# Patient Record
Sex: MALE | Race: WHITE | NOT HISPANIC OR LATINO | URBAN - METROPOLITAN AREA
[De-identification: names, ages, dates, MRNs, and addresses within clinical notes are randomized per-mention and may not be internally consistent; named-entity substitution may affect disease eponyms.]

---

## 2017-04-02 ENCOUNTER — INPATIENT (INPATIENT)
Facility: HOSPITAL | Age: 82
LOS: 0 days | Discharge: HOME | DRG: 916 | End: 2017-04-03
Attending: INTERNAL MEDICINE | Admitting: INTERNAL MEDICINE
Payer: COMMERCIAL

## 2017-04-02 VITALS
RESPIRATION RATE: 18 BRPM | OXYGEN SATURATION: 98 % | DIASTOLIC BLOOD PRESSURE: 77 MMHG | TEMPERATURE: 97 F | HEART RATE: 79 BPM | SYSTOLIC BLOOD PRESSURE: 173 MMHG | HEIGHT: 71 IN | WEIGHT: 147.05 LBS

## 2017-04-02 DIAGNOSIS — T78.40XA ALLERGY, UNSPECIFIED, INITIAL ENCOUNTER: ICD-10-CM

## 2017-04-02 DIAGNOSIS — F41.9 ANXIETY DISORDER, UNSPECIFIED: ICD-10-CM

## 2017-04-02 DIAGNOSIS — R21 RASH AND OTHER NONSPECIFIC SKIN ERUPTION: ICD-10-CM

## 2017-04-02 DIAGNOSIS — Z29.9 ENCOUNTER FOR PROPHYLACTIC MEASURES, UNSPECIFIED: ICD-10-CM

## 2017-04-02 DIAGNOSIS — I10 ESSENTIAL (PRIMARY) HYPERTENSION: ICD-10-CM

## 2017-04-02 DIAGNOSIS — E78.5 HYPERLIPIDEMIA, UNSPECIFIED: ICD-10-CM

## 2017-04-02 DIAGNOSIS — T78.3XXA ANGIONEUROTIC EDEMA, INITIAL ENCOUNTER: ICD-10-CM

## 2017-04-02 LAB
ALBUMIN SERPL ELPH-MCNC: 3.4 G/DL — SIGNIFICANT CHANGE UP (ref 3.3–5)
ALP SERPL-CCNC: 50 U/L — SIGNIFICANT CHANGE UP (ref 40–120)
ALT FLD-CCNC: 29 U/L — SIGNIFICANT CHANGE UP (ref 12–78)
ANION GAP SERPL CALC-SCNC: 7 MMOL/L — SIGNIFICANT CHANGE UP (ref 5–17)
APTT BLD: 30.1 SEC — SIGNIFICANT CHANGE UP (ref 27.5–37.4)
AST SERPL-CCNC: 40 U/L — HIGH (ref 15–37)
BASOPHILS # BLD AUTO: 0.1 K/UL — SIGNIFICANT CHANGE UP (ref 0–0.2)
BASOPHILS NFR BLD AUTO: 1.1 % — SIGNIFICANT CHANGE UP (ref 0–2)
BILIRUB SERPL-MCNC: 0.7 MG/DL — SIGNIFICANT CHANGE UP (ref 0.2–1.2)
BUN SERPL-MCNC: 25 MG/DL — HIGH (ref 7–23)
CALCIUM SERPL-MCNC: 8.5 MG/DL — SIGNIFICANT CHANGE UP (ref 8.5–10.1)
CHLORIDE SERPL-SCNC: 105 MMOL/L — SIGNIFICANT CHANGE UP (ref 96–108)
CO2 SERPL-SCNC: 27 MMOL/L — SIGNIFICANT CHANGE UP (ref 22–31)
CREAT SERPL-MCNC: 1.1 MG/DL — SIGNIFICANT CHANGE UP (ref 0.5–1.3)
EOSINOPHIL # BLD AUTO: 0.4 K/UL — SIGNIFICANT CHANGE UP (ref 0–0.5)
EOSINOPHIL NFR BLD AUTO: 4 % — SIGNIFICANT CHANGE UP (ref 0–6)
GLUCOSE SERPL-MCNC: 154 MG/DL — HIGH (ref 70–99)
HCT VFR BLD CALC: 39.7 % — SIGNIFICANT CHANGE UP (ref 39–50)
HGB BLD-MCNC: 13.7 G/DL — SIGNIFICANT CHANGE UP (ref 13–17)
INR BLD: 1.13 RATIO — SIGNIFICANT CHANGE UP (ref 0.88–1.16)
LYMPHOCYTES # BLD AUTO: 0.9 K/UL — LOW (ref 1–3.3)
LYMPHOCYTES # BLD AUTO: 9.9 % — LOW (ref 13–44)
MCHC RBC-ENTMCNC: 34.6 GM/DL — SIGNIFICANT CHANGE UP (ref 32–36)
MCHC RBC-ENTMCNC: 36.2 PG — HIGH (ref 27–34)
MCV RBC AUTO: 104.5 FL — HIGH (ref 80–100)
MONOCYTES # BLD AUTO: 1 K/UL — HIGH (ref 0–0.9)
MONOCYTES NFR BLD AUTO: 10 % — HIGH (ref 1–9)
NEUTROPHILS # BLD AUTO: 7.2 K/UL — SIGNIFICANT CHANGE UP (ref 1.8–7.4)
NEUTROPHILS NFR BLD AUTO: 75 % — SIGNIFICANT CHANGE UP (ref 43–77)
PLATELET # BLD AUTO: 303 K/UL — SIGNIFICANT CHANGE UP (ref 150–400)
POTASSIUM SERPL-MCNC: 4.7 MMOL/L — SIGNIFICANT CHANGE UP (ref 3.5–5.3)
POTASSIUM SERPL-SCNC: 4.7 MMOL/L — SIGNIFICANT CHANGE UP (ref 3.5–5.3)
PROT SERPL-MCNC: 6.7 G/DL — SIGNIFICANT CHANGE UP (ref 6–8.3)
PROTHROM AB SERPL-ACNC: 12.4 SEC — SIGNIFICANT CHANGE UP (ref 9.8–12.7)
RBC # BLD: 3.8 M/UL — LOW (ref 4.2–5.8)
RBC # FLD: 12.1 % — SIGNIFICANT CHANGE UP (ref 10.3–14.5)
SODIUM SERPL-SCNC: 139 MMOL/L — SIGNIFICANT CHANGE UP (ref 135–145)
WBC # BLD: 9.6 K/UL — SIGNIFICANT CHANGE UP (ref 3.8–10.5)
WBC # FLD AUTO: 9.6 K/UL — SIGNIFICANT CHANGE UP (ref 3.8–10.5)

## 2017-04-02 PROCEDURE — 99285 EMERGENCY DEPT VISIT HI MDM: CPT | Mod: 25

## 2017-04-02 PROCEDURE — 93010 ELECTROCARDIOGRAM REPORT: CPT

## 2017-04-02 RX ORDER — FAMOTIDINE 10 MG/ML
20 INJECTION INTRAVENOUS
Qty: 0 | Refills: 0 | Status: DISCONTINUED | OUTPATIENT
Start: 2017-04-02 | End: 2017-04-03

## 2017-04-02 RX ORDER — FLUVASTATIN SODIUM 80 MG/1
0 TABLET, FILM COATED, EXTENDED RELEASE ORAL
Qty: 0 | Refills: 0 | COMMUNITY

## 2017-04-02 RX ORDER — ALPRAZOLAM 0.25 MG
0.25 TABLET ORAL DAILY
Qty: 0 | Refills: 0 | Status: DISCONTINUED | OUTPATIENT
Start: 2017-04-02 | End: 2017-04-03

## 2017-04-02 RX ORDER — ENOXAPARIN SODIUM 100 MG/ML
40 INJECTION SUBCUTANEOUS EVERY 24 HOURS
Qty: 0 | Refills: 0 | Status: DISCONTINUED | OUTPATIENT
Start: 2017-04-02 | End: 2017-04-03

## 2017-04-02 RX ORDER — ALPRAZOLAM 0.25 MG
0 TABLET ORAL
Qty: 0 | Refills: 0 | COMMUNITY

## 2017-04-02 RX ORDER — SIMVASTATIN 20 MG/1
20 TABLET, FILM COATED ORAL AT BEDTIME
Qty: 0 | Refills: 0 | Status: DISCONTINUED | OUTPATIENT
Start: 2017-04-02 | End: 2017-04-03

## 2017-04-02 RX ORDER — DIPHENHYDRAMINE HCL 50 MG
50 CAPSULE ORAL ONCE
Qty: 0 | Refills: 0 | Status: COMPLETED | OUTPATIENT
Start: 2017-04-02 | End: 2017-04-02

## 2017-04-02 RX ORDER — FAMOTIDINE 10 MG/ML
20 INJECTION INTRAVENOUS ONCE
Qty: 0 | Refills: 0 | Status: COMPLETED | OUTPATIENT
Start: 2017-04-02 | End: 2017-04-02

## 2017-04-02 RX ORDER — AMLODIPINE BESYLATE 2.5 MG/1
5 TABLET ORAL DAILY
Qty: 0 | Refills: 0 | Status: DISCONTINUED | OUTPATIENT
Start: 2017-04-02 | End: 2017-04-03

## 2017-04-02 RX ORDER — DIPHENHYDRAMINE HCL 50 MG
25 CAPSULE ORAL ONCE
Qty: 0 | Refills: 0 | Status: COMPLETED | OUTPATIENT
Start: 2017-04-02 | End: 2017-04-02

## 2017-04-02 RX ORDER — AMLODIPINE BESYLATE 2.5 MG/1
5 TABLET ORAL ONCE
Qty: 0 | Refills: 0 | Status: DISCONTINUED | OUTPATIENT
Start: 2017-04-02 | End: 2017-04-02

## 2017-04-02 RX ADMIN — Medication 20 MILLIGRAM(S): at 21:55

## 2017-04-02 RX ADMIN — FAMOTIDINE 20 MILLIGRAM(S): 10 INJECTION INTRAVENOUS at 22:06

## 2017-04-02 RX ADMIN — Medication 125 MILLIGRAM(S): at 07:49

## 2017-04-02 RX ADMIN — AMLODIPINE BESYLATE 5 MILLIGRAM(S): 2.5 TABLET ORAL at 16:14

## 2017-04-02 RX ADMIN — Medication 25 MILLIGRAM(S): at 21:55

## 2017-04-02 RX ADMIN — Medication 50 MILLIGRAM(S): at 07:49

## 2017-04-02 RX ADMIN — FAMOTIDINE 20 MILLIGRAM(S): 10 INJECTION INTRAVENOUS at 07:49

## 2017-04-02 NOTE — H&P ADULT - PROBLEM SELECTOR PLAN 3
mood stable. pt. states intermittently anxious, well controlled with xanax prn, will continue as needed pt. states he is on Lescol xl 80 mg at home, theraputic interchange: zocor 20 mg daily elevated in ED, likely secondary to not getting AM dose and anxiety. ACE-I held secondary to possible reaction.   - pt. to be started on norvasc 5mg PO daily with hold parameters, will trend bp per routine and adjust medication as warranted

## 2017-04-02 NOTE — ED PROVIDER NOTE - CARE PLAN
Principal Discharge DX:	Allergic reaction, initial encounter Principal Discharge DX:	Allergic reaction, initial encounter  Secondary Diagnosis:	Angioedema, initial encounter

## 2017-04-02 NOTE — ED ADULT NURSE NOTE - OBJECTIVE STATEMENT
ptient comes to ED for evaluation of lip swelling and rash states lips began swelling yesterday with rash no difficulty breathing pt on no new meds

## 2017-04-02 NOTE — H&P ADULT - PROBLEM SELECTOR PLAN 4
lovenox 40 mg subcut daily  dash/tlc diet   aspiration precautions  ambulate with assistance  out of bed with assistance  fall precautions mood stable. pt. states intermittently anxious, well controlled with xanax prn, will continue as needed pt. states he is on Lescol xl 80 mg at home, theraputic interchange: zocor 20 mg daily

## 2017-04-02 NOTE — ED PROVIDER NOTE - ENMT, MLM
Airway patent, Mild swelling to lips, Mouth with normal mucosa. Throat has no vesicles, no oropharyngeal exudates and uvula is midline, but swollen.

## 2017-04-02 NOTE — H&P ADULT - PROBLEM SELECTOR PLAN 2
elevated in ED, likely secondary to not getting AM dose and anxiety. ACE-I held secondary to possible reaction.   - pt. to be started on norvasc 5mg PO daily with hold parameters, will trend bp per routine and adjust medication as warranted Pt. has maculopapular rash on trunk and back. Currently not complaining about itch. Will monitor for progression/ regression.   - Monitor spot on posterior midline neck crease

## 2017-04-02 NOTE — ED PROVIDER NOTE - CHPI ED SYMPTOMS NEG
no vomiting/no shortness of breath/no nausea/no cough/no throat itching/no difficulty breathing/no wheezing/no difficulty swallowing

## 2017-04-02 NOTE — ED PROVIDER NOTE - PROGRESS NOTE DETAILS
pt improved somehwat, but uvula still swollen. pt's son arrived at bedside, he relates pt's voice sounds different to him. d/w perlman, he will admit pt

## 2017-04-02 NOTE — ED PROVIDER NOTE - OBJECTIVE STATEMENT
pt c/o few days of itchy rash to torso, woke up this am with swelling to lips. no fevers, chills, ha, d/n/v, cp, sob, abd pain.  pmd - grady pt c/o few days of itchy rash to torso, woke up this am with swelling to lips. no fevers, chills, ha, d/n/v, cp, sob, abd pain. per wife, no voice changes  pmd - grady

## 2017-04-02 NOTE — H&P ADULT - ATTENDING COMMENTS
pt seen and agree w housestaff and case discussed  advance care planning discussed with pt and family as well  family to bring health care proxy
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
## 2017-04-02 NOTE — H&P ADULT - PROBLEM SELECTOR PLAN 1
Pt. has hx of ACE-I usage for the past 30+ years with no prior/ similar reaction. Pt. states no introduction of new agents recently.   - Pt. admitted to remote tele for labs and vitals to be monitored per routine, will monitor closely for signs/ symptoms of progressive angioedema  - ACE-I held  - Solumedrol IV 20 mg q8

## 2017-04-02 NOTE — H&P ADULT - HISTORY OF PRESENT ILLNESS
86 yo m pmhx htn, hld, and anxiety presents to the ED with rash on his torso and lip swelling. Pt. has been on ACE-I for years.     In the ED, pt. vss (afebrile) except BP elevated (likely secondary to not receiving AM dose of BP meds and anxiety). Labs wnl except dip in RBC (3.80) and elevated MCV (104.5), will continue to trend. Pt. received benadryl, pepcid, and solumedrol. EKG pending. Pt. admitted to remote [facility] with allergic reaction/ angioedema. 84 yo m pmhx htn, hld, and anxiety presents to the ED with rash on his torso and lip swelling. Pt. has been on ACE-I for 30+ years. Pt. complained to wife the past few days that he was itchy on his sides, wife looked a few days ago and did not see any rashes. Today pt. presents with excoriations on his trunk and back. Pt. denies burning, introduction of new foods, detergents, recent travel, sick contact, camping, or contact with kids. Pt. woke up this morning with lip swelling. Family stated his lips were very large and dark red. On examination, lips went back to normal per wife bedside (wife and  are poor historians). Pt. denies fevers, chills, difficulty swallowing, difficulty taking deep breaths, n/v/d/c, and all else on ROS.        In the ED, pt. vss (afebrile) except BP elevated (likely secondary to not receiving AM dose of BP meds and anxiety). Labs wnl except dip in RBC (3.80) and elevated MCV (104.5), will continue to trend. Pt. received benadryl, pepcid, and solumedrol. EKG nsr, 77 bpm. Pt. admitted to remote tele with allergic reaction/ angioedema.

## 2017-04-03 VITALS
SYSTOLIC BLOOD PRESSURE: 131 MMHG | RESPIRATION RATE: 16 BRPM | HEART RATE: 75 BPM | OXYGEN SATURATION: 96 % | TEMPERATURE: 98 F | DIASTOLIC BLOOD PRESSURE: 69 MMHG

## 2017-04-03 DIAGNOSIS — Z71.89 OTHER SPECIFIED COUNSELING: ICD-10-CM

## 2017-04-03 LAB
ALBUMIN SERPL ELPH-MCNC: 4 G/DL — SIGNIFICANT CHANGE UP (ref 3.3–5)
ALP SERPL-CCNC: 65 U/L — SIGNIFICANT CHANGE UP (ref 40–120)
ALT FLD-CCNC: 27 U/L — SIGNIFICANT CHANGE UP (ref 12–78)
ANION GAP SERPL CALC-SCNC: 10 MMOL/L — SIGNIFICANT CHANGE UP (ref 5–17)
AST SERPL-CCNC: 18 U/L — SIGNIFICANT CHANGE UP (ref 15–37)
BASOPHILS # BLD AUTO: 0 K/UL — SIGNIFICANT CHANGE UP (ref 0–0.2)
BASOPHILS NFR BLD AUTO: 0.2 % — SIGNIFICANT CHANGE UP (ref 0–2)
BILIRUB SERPL-MCNC: 0.7 MG/DL — SIGNIFICANT CHANGE UP (ref 0.2–1.2)
BUN SERPL-MCNC: 24 MG/DL — HIGH (ref 7–23)
CALCIUM SERPL-MCNC: 9.2 MG/DL — SIGNIFICANT CHANGE UP (ref 8.5–10.1)
CHLORIDE SERPL-SCNC: 101 MMOL/L — SIGNIFICANT CHANGE UP (ref 96–108)
CO2 SERPL-SCNC: 27 MMOL/L — SIGNIFICANT CHANGE UP (ref 22–31)
CREAT SERPL-MCNC: 1.1 MG/DL — SIGNIFICANT CHANGE UP (ref 0.5–1.3)
EOSINOPHIL # BLD AUTO: 0 K/UL — SIGNIFICANT CHANGE UP (ref 0–0.5)
EOSINOPHIL NFR BLD AUTO: 0 % — SIGNIFICANT CHANGE UP (ref 0–6)
FOLATE SERPL-MCNC: >20 NG/ML — SIGNIFICANT CHANGE UP (ref 4.8–24.2)
GLUCOSE SERPL-MCNC: 168 MG/DL — HIGH (ref 70–99)
HCT VFR BLD CALC: 45.3 % — SIGNIFICANT CHANGE UP (ref 39–50)
HGB BLD-MCNC: 15.1 G/DL — SIGNIFICANT CHANGE UP (ref 13–17)
LYMPHOCYTES # BLD AUTO: 0.6 K/UL — LOW (ref 1–3.3)
LYMPHOCYTES # BLD AUTO: 4.4 % — LOW (ref 13–44)
MCHC RBC-ENTMCNC: 33.4 GM/DL — SIGNIFICANT CHANGE UP (ref 32–36)
MCHC RBC-ENTMCNC: 35.8 PG — HIGH (ref 27–34)
MCV RBC AUTO: 107 FL — HIGH (ref 80–100)
MONOCYTES # BLD AUTO: 0.4 K/UL — SIGNIFICANT CHANGE UP (ref 0–0.9)
MONOCYTES NFR BLD AUTO: 3.2 % — SIGNIFICANT CHANGE UP (ref 1–9)
NEUTROPHILS # BLD AUTO: 12.8 K/UL — HIGH (ref 1.8–7.4)
NEUTROPHILS NFR BLD AUTO: 92.2 % — HIGH (ref 43–77)
PLATELET # BLD AUTO: 412 K/UL — HIGH (ref 150–400)
POTASSIUM SERPL-MCNC: 4.3 MMOL/L — SIGNIFICANT CHANGE UP (ref 3.5–5.3)
POTASSIUM SERPL-SCNC: 4.3 MMOL/L — SIGNIFICANT CHANGE UP (ref 3.5–5.3)
PROT SERPL-MCNC: 7.8 G/DL — SIGNIFICANT CHANGE UP (ref 6–8.3)
RBC # BLD: 4.23 M/UL — SIGNIFICANT CHANGE UP (ref 4.2–5.8)
RBC # FLD: 12.2 % — SIGNIFICANT CHANGE UP (ref 10.3–14.5)
SODIUM SERPL-SCNC: 138 MMOL/L — SIGNIFICANT CHANGE UP (ref 135–145)
TSH SERPL-MCNC: 0.38 UIU/ML — SIGNIFICANT CHANGE UP (ref 0.36–3.74)
VIT B12 SERPL-MCNC: 638 PG/ML — SIGNIFICANT CHANGE UP (ref 243–894)
WBC # BLD: 13.9 K/UL — HIGH (ref 3.8–10.5)
WBC # FLD AUTO: 13.9 K/UL — HIGH (ref 3.8–10.5)

## 2017-04-03 PROCEDURE — 82746 ASSAY OF FOLIC ACID SERUM: CPT

## 2017-04-03 PROCEDURE — 93005 ELECTROCARDIOGRAM TRACING: CPT

## 2017-04-03 PROCEDURE — 85610 PROTHROMBIN TIME: CPT

## 2017-04-03 PROCEDURE — 84443 ASSAY THYROID STIM HORMONE: CPT

## 2017-04-03 PROCEDURE — 85730 THROMBOPLASTIN TIME PARTIAL: CPT

## 2017-04-03 PROCEDURE — 96375 TX/PRO/DX INJ NEW DRUG ADDON: CPT

## 2017-04-03 PROCEDURE — 99285 EMERGENCY DEPT VISIT HI MDM: CPT | Mod: 25

## 2017-04-03 PROCEDURE — 85027 COMPLETE CBC AUTOMATED: CPT

## 2017-04-03 PROCEDURE — 80053 COMPREHEN METABOLIC PANEL: CPT

## 2017-04-03 PROCEDURE — 96374 THER/PROPH/DIAG INJ IV PUSH: CPT

## 2017-04-03 PROCEDURE — 82607 VITAMIN B-12: CPT

## 2017-04-03 RX ORDER — AMLODIPINE BESYLATE 2.5 MG/1
1 TABLET ORAL
Qty: 30 | Refills: 0 | OUTPATIENT
Start: 2017-04-03 | End: 2017-05-03

## 2017-04-03 RX ORDER — AMLODIPINE BESYLATE 2.5 MG/1
0.5 TABLET ORAL
Qty: 0 | Refills: 0 | DISCHARGE
Start: 2017-04-03 | End: 2017-05-03

## 2017-04-03 RX ADMIN — FAMOTIDINE 20 MILLIGRAM(S): 10 INJECTION INTRAVENOUS at 06:03

## 2017-04-03 RX ADMIN — Medication 20 MILLIGRAM(S): at 06:03

## 2017-04-03 RX ADMIN — AMLODIPINE BESYLATE 5 MILLIGRAM(S): 2.5 TABLET ORAL at 06:03

## 2017-04-03 NOTE — DISCHARGE NOTE ADULT - PLAN OF CARE
resolution of swelling swelling resolved, uvula midline, likely secondary to ACE-I  hold ACE-I, start norvasc for bp, use as directed   follow up with primary care doctor (Dr. Londono) within 3 days of discharge   if swelling returns or progresses, return to ED immediately resolution much has resolved after being on benadryl, pepcid (H blockers), steriods  if returns or progresses, return to the ED immediately hold ACE-I  start norvasc as directed  follow up with primary care doctor (Dr. Londono) within 3 days of discharge cont. home med (Lescol xl) mood stable. cont. xanax prn swelling resolved, uvula midline, likely secondary to ACE-I  hold ACE-I, start norvasc for bp, use as directed   follow up with primary care doctor (Dr. Londono) within 3 days of discharge   if swelling returns or progresses, return to ED immediately.  do not ever take "ACE inhibitors" or "Angiotensin Receptor Blockers (ARBs)" for blood pressure much has resolved after being on benadryl, pepcid (H blockers), steriod

## 2017-04-03 NOTE — DISCHARGE NOTE ADULT - HOSPITAL COURSE
84 yo m pmhx htn, hld, and anxiety presents to the ED with rash on his torso and lip swelling. Pt. has been on ACE-I for 30+ years. Pt. complained to wife the past few days prior to admission that he was itchy on his sides, wife looked a few days ago and did not see any rashes. On day of admission, pt. presented with excoriations on his trunk and back. Pt. denied burning, introduction of new foods, detergents, recent travel, sick contact, camping, or contact with kids. Pt. woke up the morning of admission with lip swelling. Family stated his lips were very large and dark red. On examination, lips went back to normal per wife bedside (wife and  are poor historians). Pt. denied fevers, chills, difficulty swallowing, difficulty taking deep breaths, n/v/d/c, and all else on ROS.        In the ED, pt. vss (afebrile) except BP elevated (likely secondary to not receiving AM dose of BP meds and anxiety). Labs wnl except dip in RBC (3.80) and elevated MCV (104.5), will continue to trend. Pt. received benadryl, pepcid, and solumedrol. EKG nsr, 77 bpm. Pt. admitted to remote [facility] with allergic reaction/ angioedema.    Pt. was monitored per routine. ACE-I was held, pt. was started on norvasc. Pt. was on solumedrol 20mg IV push q8 and pepcid 20 mg IV bid. Zocor was used for hld. Xanax prn anxiety. Swelling resolved completely, rash on trunk and back mostly resolved as well. Pt. stable for dc home this afternoon. Pt. aware to follow up with PMD Bry within 3 days of discharge and to cont. norvasc at home.

## 2017-04-03 NOTE — DISCHARGE NOTE ADULT - CARE PLAN
Principal Discharge DX:	Angioedema, initial encounter  Goal:	resolution of swelling  Instructions for follow-up, activity and diet:	swelling resolved, uvula midline, likely secondary to ACE-I  hold ACE-I, start norvasc for bp, use as directed   follow up with primary care doctor (Dr. Londono) within 3 days of discharge   if swelling returns or progresses, return to ED immediately  Secondary Diagnosis:	Rash  Goal:	resolution  Instructions for follow-up, activity and diet:	much has resolved after being on benadryl, pepcid (H blockers), steriods  if returns or progresses, return to the ED immediately  Secondary Diagnosis:	Hypertension  Instructions for follow-up, activity and diet:	hold ACE-I  start norvasc as directed  follow up with primary care doctor (Dr. Londono) within 3 days of discharge  Secondary Diagnosis:	Hyperlipidemia  Instructions for follow-up, activity and diet:	cont. home med (Lescol xl)  Secondary Diagnosis:	Anxiety  Instructions for follow-up, activity and diet:	mood stable. cont. xanax prn Principal Discharge DX:	Angioedema, initial encounter  Goal:	resolution of swelling  Instructions for follow-up, activity and diet:	swelling resolved, uvula midline, likely secondary to ACE-I  hold ACE-I, start norvasc for bp, use as directed   follow up with primary care doctor (Dr. Londono) within 3 days of discharge   if swelling returns or progresses, return to ED immediately.  do not ever take "ACE inhibitors" or "Angiotensin Receptor Blockers (ARBs)" for blood pressure  Secondary Diagnosis:	Rash  Goal:	resolution  Instructions for follow-up, activity and diet:	much has resolved after being on benadryl, pepcid (H blockers), steriod  Secondary Diagnosis:	Hypertension  Instructions for follow-up, activity and diet:	hold ACE-I  start norvasc as directed  follow up with primary care doctor (Dr. Londono) within 3 days of discharge  Secondary Diagnosis:	Hyperlipidemia  Instructions for follow-up, activity and diet:	cont. home med (Lescol xl)  Secondary Diagnosis:	Anxiety  Instructions for follow-up, activity and diet:	mood stable. cont. xanax prn

## 2017-04-03 NOTE — DISCHARGE NOTE ADULT - MEDICATION SUMMARY - MEDICATIONS TO STOP TAKING
I will STOP taking the medications listed below when I get home from the hospital:    enalapril  --  by mouth    enalapril 20 mg oral tablet  -- 1 tab(s) by mouth once a day

## 2017-04-03 NOTE — DISCHARGE NOTE ADULT - PATIENT PORTAL LINK FT
“You can access the [facility] Patient Portal, offered by [facility], by registering with the following website: [email or web address]”

## 2017-04-03 NOTE — DISCHARGE NOTE ADULT - MEDICATION SUMMARY - MEDICATIONS TO TAKE
I will START or STAY ON the medications listed below when I get home from the hospital:    Lescol XL 80 mg oral tablet, extended release  -- 1 tab(s) by mouth once a day  -- Indication: For Hld    ALPRAZolam  -- 25 milligram(s) by mouth once a day, As Needed  -- Indication: For Anxiety I will START or STAY ON the medications listed below when I get home from the hospital:    predniSONE 20 mg oral tablet  -- 1 tab(s) by mouth once a day  -- It is very important that you take or use this exactly as directed.  Do not skip doses or discontinue unless directed by your doctor.  Obtain medical advice before taking any non-prescription drugs as some may affect the action of this medication.  Take with food or milk.    -- Indication: For Angioedema, initial encounter    Claritin 10 mg oral tablet  -- 1 tab(s) by mouth once a day for 2 weeks  -- Indication: For Angioedema, initial encounter    Lescol XL 80 mg oral tablet, extended release  -- 1 tab(s) by mouth once a day  -- Indication: For chol    ALPRAZolam  -- 25 milligram(s) by mouth once a day, As Needed  -- Indication: For Anxiety    amLODIPine 5 mg oral tablet  -- 1 tab(s) by mouth once a day  -- Indication: For Htn

## 2017-04-03 NOTE — PROGRESS NOTE ADULT - PROBLEM SELECTOR PLAN 6
d/c planning as pt. condition improving. d/c planning as pt. condition improving.  remote tele d/c'c

## 2017-04-03 NOTE — PROGRESS NOTE ADULT - PROBLEM SELECTOR PLAN 1
Improving. Pt. has hx of ACE-I usage for the past 30+ years with no prior/ similar reaction. Pt. states no introduction of new agents recently.   - Pt. admitted to remote tele for labs and vitals to be monitored per routine, will monitor closely for signs/ symptoms of progressive angioedema-- no symptom progression, tolerating PO  - ACE-I held  - Solumedrol IV 20 mg q8.   - pepcid 20 mg iv bid

## 2017-04-03 NOTE — PROGRESS NOTE ADULT - ASSESSMENT
86 yo m pmhx htn, hld, and anxiety presents to the ED with rash on his torso and lip swelling. Pt. admitted for allergic reaction/ angioedema, likely secondary to ACE-I.

## 2017-04-03 NOTE — DISCHARGE NOTE ADULT - PROVIDER TOKENS
FREE:[LAST:[Bry],FIRST:[Willis],PHONE:[phone]],FAX:[(   [phone]    -],ADDRESS:[[address] [place] [address]]]

## 2017-04-03 NOTE — PROGRESS NOTE ADULT - SUBJECTIVE AND OBJECTIVE BOX
Patient is a 85y old  Male who presents with a chief complaint of Swelling of my mouth and rash (02 Apr 2017 20:49)      HPI: 84 yo m pmhx htn, hld, and anxiety presents to the ED with rash on his torso and lip swelling. Pt. has been on ACE-I for 30+ years. Pt. complained to wife the past few days that he was itchy on his sides, wife looked a few days ago and did not see any rashes. Today pt. presents with excoriations on his trunk and back. Pt. denies burning, introduction of new foods, detergents, recent travel, sick contact, camping, or contact with kids. Pt. woke up this morning with lip swelling. Family stated his lips were very large and dark red. On examination, lips went back to normal per wife bedside (wife and  are poor historians). Pt. denies fevers, chills, difficulty swallowing, difficulty taking deep breaths, n/v/d/c, and all else on ROS.        New Complaints/ Events: Pt. feels much better.     MEDICATIONS  (STANDING):  simvastatin 20milliGRAM(s) Oral at bedtime  enoxaparin Injectable 40milliGRAM(s) SubCutaneous every 24 hours  amLODIPine   Tablet 5milliGRAM(s) Oral daily  methylPREDNISolone sodium succinate Injectable 20milliGRAM(s) IV Push every 8 hours  famotidine Injectable 20milliGRAM(s) IV Push two times a day    MEDICATIONS  (PRN):  ALPRAZolam 0.25milliGRAM(s) Oral daily PRN anxiety      Allergies    amoxicillin (Unknown)  enalapril (Unknown)    Intolerances        Review of Systems:  · General	negative	  · Skin Symptoms	rash; diffuse maculopapular rash on trunk and back in no particular pattern	  · Ophthalmologic	negative	  · ENMT	negative	  · Respiratory and Thorax	negative	  · Cardiovascular	negative	  · Gastrointestinal	negative	  · Genitourinary	negative	  · Musculoskeletal	negative	  · Neurological	negative	  · Psychiatric	negative	  · Hematology/Lymphatics	negative	  · Endocrine	negative	  · Allergic/Immunologic	negative	  · Additional ROS	denies difficulty swallowing or sob	       Vital Signs Last 24 Hrs  T(C): 36.6, Max: 36.6 (04-02 @ 17:29)  T(F): 97.9, Max: 97.9 (04-03 @ 05:37)  HR: 75 (75 - 88)  BP: 131/69 (129/75 - 154/54)  BP(mean): --  RR: 16 (16 - 16)  SpO2: 96% (95% - 99%)    Physical Exam:  · Constitutional	well-developed; well-groomed; no distress	  · Eyes	EOMI; PERRL; no drainage or redness	  · ENMT 	enlarged uvula, nonerythematous posterior pharynx	  · Neck 	deep red macular spot midline of his neck, 3" x 3", nontender	  · Breasts	No masses; no nipple discharge	  · Back 	diffuse maculopapular rash with excoriations	  · Respiratory	Breath Sounds equal & clear to percussion & auscultation, no accessory muscle use	  · Cardiovascular	Regular rate & rhythm, normal S1, S2; no murmurs, gallops or rubs; no S3, S4	  · Gastrointestinal	Soft, non-tender, no hepatosplenomegaly, normal bowel sounds	  · Neurological	Alert & oriented; no sensory, motor or coordination deficits, normal reflexes	  · Skin Comments	diffuse maculopapular rash on his trunk and back, finger tips erythematous b/l	      DIET: dash/ tlc    LABS: labs pending      HEALTH ISSUES - PROBLEM Dx:  Angioedema  Rash  Hyperlipidemia  Anxiety  Hypertension  Angioedema      Care Discussed with [X] Consultants  [x ] Patient  [ ] Family  [X]      [x ] Other; RN  DVT ppx lovenox  Advanced directive: discussed with family, will bring in healthcare proxy document  Discussed with pt @ bedside

## 2017-04-03 NOTE — PROGRESS NOTE ADULT - PROBLEM SELECTOR PLAN 2
Improving. Pt. has maculopapular rash on trunk and back. Currently not complaining about itch. Will monitor for progression/ regression.   - Monitor spot on posterior midline neck crease.

## 2017-04-03 NOTE — DISCHARGE NOTE ADULT - CARE PROVIDER_API CALL
Willis Londono  [address] [place] [address] [id number]  [place] [id number]  Phone: [phone]  Fax: (   )    -

## 2017-04-05 DIAGNOSIS — R21 RASH AND OTHER NONSPECIFIC SKIN ERUPTION: ICD-10-CM

## 2017-04-05 DIAGNOSIS — F41.9 ANXIETY DISORDER, UNSPECIFIED: ICD-10-CM

## 2017-04-05 DIAGNOSIS — T78.3XXA ANGIONEUROTIC EDEMA, INITIAL ENCOUNTER: ICD-10-CM

## 2017-04-05 DIAGNOSIS — E78.5 HYPERLIPIDEMIA, UNSPECIFIED: ICD-10-CM

## 2017-04-05 DIAGNOSIS — I10 ESSENTIAL (PRIMARY) HYPERTENSION: ICD-10-CM
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
## 2017-04-05 DIAGNOSIS — T46.4X5A ADVERSE EFFECT OF ANGIOTENSIN-CONVERTING-ENZYME INHIBITORS, INITIAL ENCOUNTER: ICD-10-CM

## 2018-01-15 ENCOUNTER — EMERGENCY (EMERGENCY)
Facility: HOSPITAL | Age: 83
LOS: 1 days | Discharge: HOME | End: 2018-01-15
Attending: EMERGENCY MEDICINE | Admitting: EMERGENCY MEDICINE
Payer: MEDICARE

## 2018-01-15 VITALS
TEMPERATURE: 98 F | HEART RATE: 83 BPM | DIASTOLIC BLOOD PRESSURE: 85 MMHG | SYSTOLIC BLOOD PRESSURE: 180 MMHG | OXYGEN SATURATION: 100 % | RESPIRATION RATE: 15 BRPM

## 2018-01-15 VITALS
HEART RATE: 78 BPM | WEIGHT: 145.06 LBS | OXYGEN SATURATION: 99 % | HEIGHT: 71 IN | TEMPERATURE: 97 F | DIASTOLIC BLOOD PRESSURE: 84 MMHG | SYSTOLIC BLOOD PRESSURE: 192 MMHG | RESPIRATION RATE: 16 BRPM

## 2018-01-15 PROBLEM — E78.5 HYPERLIPIDEMIA, UNSPECIFIED: Chronic | Status: ACTIVE | Noted: 2017-04-02

## 2018-01-15 PROBLEM — F41.9 ANXIETY DISORDER, UNSPECIFIED: Chronic | Status: ACTIVE | Noted: 2017-04-02

## 2018-01-15 PROBLEM — I10 ESSENTIAL (PRIMARY) HYPERTENSION: Chronic | Status: ACTIVE | Noted: 2017-04-02

## 2018-01-15 PROCEDURE — 70450 CT HEAD/BRAIN W/O DYE: CPT

## 2018-01-15 PROCEDURE — 30901 CONTROL OF NOSEBLEED: CPT

## 2018-01-15 PROCEDURE — 99284 EMERGENCY DEPT VISIT MOD MDM: CPT | Mod: 25

## 2018-01-15 PROCEDURE — 70450 CT HEAD/BRAIN W/O DYE: CPT | Mod: 26

## 2018-01-15 PROCEDURE — 30901 CONTROL OF NOSEBLEED: CPT | Mod: RT

## 2018-01-15 RX ORDER — LORATADINE 10 MG/1
1 TABLET ORAL
Qty: 0 | Refills: 0 | COMMUNITY

## 2018-01-15 RX ORDER — ACETAMINOPHEN 500 MG
650 TABLET ORAL ONCE
Qty: 0 | Refills: 0 | Status: COMPLETED | OUTPATIENT
Start: 2018-01-15 | End: 2018-01-15

## 2018-01-15 RX ADMIN — Medication 650 MILLIGRAM(S): at 06:09
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
## 2018-01-15 NOTE — ED ADULT NURSE NOTE - OBJECTIVE STATEMENT
patient a/o x 4 with a calm affect c/o epistaxis from right side.  bleeding controlled by self applied gauze, currently not bleeding when MD removed the gauze.  pending CT of head and results.  RN will continue to monitor patient closely.

## 2018-06-29 ENCOUNTER — EMERGENCY (EMERGENCY)
Facility: HOSPITAL | Age: 83
LOS: 1 days | Discharge: HOME | End: 2018-06-29
Attending: EMERGENCY MEDICINE
Payer: MEDICARE

## 2018-06-29 VITALS
HEART RATE: 69 BPM | DIASTOLIC BLOOD PRESSURE: 81 MMHG | OXYGEN SATURATION: 99 % | SYSTOLIC BLOOD PRESSURE: 137 MMHG | TEMPERATURE: 98 F | RESPIRATION RATE: 16 BRPM

## 2018-06-29 VITALS
HEART RATE: 110 BPM | TEMPERATURE: 97 F | SYSTOLIC BLOOD PRESSURE: 173 MMHG | OXYGEN SATURATION: 95 % | DIASTOLIC BLOOD PRESSURE: 83 MMHG | RESPIRATION RATE: 16 BRPM

## 2018-06-29 PROCEDURE — 99282 EMERGENCY DEPT VISIT SF MDM: CPT

## 2018-06-29 PROCEDURE — 99283 EMERGENCY DEPT VISIT LOW MDM: CPT

## 2018-06-29 NOTE — ED PROVIDER NOTE - ENMT, MLM
Airway patent, Nasal mucosa clear. Mouth with bloodied mucosa bl nares larger in r nares, but bleeding has stopped after primary er rn cleand pt and held pressure.

## 2018-06-29 NOTE — ED ADULT NURSE REASSESSMENT NOTE - NS ED NURSE REASSESS COMMENT FT1
firm, direct pressure applied.  affrin instilled in b/l nares.  bleeding controlled.  pt reports feeling better.  d/c home with epistaxis instructions.   pt verbalized understanding.  all questions answered.  pt left unit ambulating with steady gait accompanied by his wife without incident.

## 2018-06-29 NOTE — ED ADULT NURSE NOTE - CHPI ED SYMPTOMS NEG
no vomiting/no blurred vision/no weakness/no numbness/no loss of consciousness/no change in level of consciousness/no chills/no syncope/no nausea/no fever

## 2018-06-29 NOTE — ED PROVIDER NOTE - PROGRESS NOTE DETAILS
bleeding remains stopped no further bleeding, no source seen. instructions reviewed with pt and wife. will type them supplies for care provided

## 2018-06-29 NOTE — ED PROVIDER NOTE - OBJECTIVE STATEMENT
pt has hx of nose bleeds began bleeding very briskly out of r nares just pta. unable to control the bleeding he was bib wife for eval.

## 2018-07-01 ENCOUNTER — EMERGENCY (EMERGENCY)
Facility: HOSPITAL | Age: 83
LOS: 1 days | Discharge: HOME | End: 2018-07-01
Attending: INTERNAL MEDICINE
Payer: MEDICARE

## 2018-07-01 VITALS
RESPIRATION RATE: 16 BRPM | OXYGEN SATURATION: 97 % | HEART RATE: 82 BPM | SYSTOLIC BLOOD PRESSURE: 151 MMHG | DIASTOLIC BLOOD PRESSURE: 80 MMHG | TEMPERATURE: 98 F

## 2018-07-01 VITALS — HEIGHT: 65 IN | WEIGHT: 145.06 LBS

## 2018-07-01 PROCEDURE — 99283 EMERGENCY DEPT VISIT LOW MDM: CPT | Mod: 25

## 2018-07-01 PROCEDURE — 30901 CONTROL OF NOSEBLEED: CPT

## 2018-07-01 PROCEDURE — 30901 CONTROL OF NOSEBLEED: CPT | Mod: RT

## 2018-07-01 RX ORDER — SODIUM CHLORIDE 0.65 %
1 AEROSOL, SPRAY (ML) NASAL ONCE
Qty: 0 | Refills: 0 | Status: DISCONTINUED | OUTPATIENT
Start: 2018-07-01 | End: 2018-07-05

## 2018-07-01 RX ADMIN — Medication 100 MILLIGRAM(S): at 05:37

## 2018-07-01 NOTE — ED ADULT NURSE NOTE - OBJECTIVE STATEMENT
Pt received on stretcher via EMS, c/o nose bleed. Pt stated c/o bleed from left nostril x 1 hour. Pt denies n/v/d, dizziness, headache. No acute distress.

## 2018-07-01 NOTE — ED ADULT NURSE NOTE - EENT WDL
Eyes with no visual disturbances.  Ears clean and dry and no hearing difficulties. Nose with pink mucosa and bleed from left nostril. Mouth mucous membranes moist and pink.  No tenderness or swelling to throat or neck.

## 2018-07-01 NOTE — ED PROVIDER NOTE - OBJECTIVE STATEMENT
87 y/o WM with spontaneous nose bleed, reoccurred. He was here on Friday when his bleeding resolved spontaneously.

## 2018-09-14 NOTE — H&P ADULT - ASSESSMENT
no
86 yo m pmhx htn, hld, and anxiety presents to the ED with rash on his torso and lip swelling. Pt. admitted for allergic reaction/ angioedema, likely secondary to ACE-I.

## 2018-11-19 NOTE — H&P ADULT - PROBLEM SELECTOR PROBLEM 6
"Chief Complaint   Patient presents with     ER F/U     Pancreatitis        Initial /74  Pulse 81  Temp 98.4  F (36.9  C) (Tympanic)  Resp 12  Wt 239 lb (108.4 kg)  SpO2 98%  BMI 36.34 kg/m2 Estimated body mass index is 36.34 kg/(m^2) as calculated from the following:    Height as of 11/11/18: 5' 8\" (1.727 m).    Weight as of this encounter: 239 lb (108.4 kg).    Patient presents to the clinic using No DME    Health Maintenance that is potentially due pending provider review:  Colonoscopy/FIT - pt has FIT test at home    Gave pt phone number/pended order to schedule mammo and/or colonoscopy(or FIT)    Is there anyone who you would like to be able to receive your results? No  If yes have patient fill out JARVIS      " Prophylactic measure

## 2019-01-01 NOTE — H&P ADULT - PSYCHIATRIC
[place], [place]
 
                               PATIENT HISTORY AND PHYSICAL EXAM
 
        NAME: TATI MIX                     [facility] #: [id number]  
        UNIT #: [id number]                        ROOM: [facility]    
        DOCTOR: MIC HERZOG MD             BIRTHDATE: [birth year]
 
 
DOS: 01/01/2019
 
HISTORY OF PRESENT ILLNESS:  The patient has been admitted to the hospital with
acute difficulty in breathing.  The patient slept very comfortably last night,
but woke up as if his throat was closing on him and he came to the Emergency
Department from where he was admitted to hospital possibly with the
angioneurotic edema and with the treatment given in the Emergency Department,
the patient is already feeling better.  He is having no difficulty in breathing.
 He denies any chest pain, no headache, no fever or chills.  He said he was
feeling allright before that.  The patient works in a very [place] environment,
that has caused this problem before.  He has gone through this problem many
times before and has been treated for angioneurotic edema as outpatient.
 
SOCIAL HISTORY:  The patient does not drink, but he rubs snuff, but does not
smoke.
 
FAMILY HISTORY:  His father has significant history of heart disease, had MI and
pacemaker implant and open heart surgery when he was 57 years old.  Mother is
healthy.
 
MEDICATIONS:  The patient is taking Prilosec 20 mg for GERD syndrome and he
takes allergy medicine on regular basis.
 
PAST MEDICAL HISTORY:  The patient has past history of chest pain, but it was
noncardiac origin.
 
ALLERGIES:  No known allergy.
 
PHYSICAL EXAMINATION:
GENERAL:  The patient is conscious, alert and oriented, not in any distress.
VITAL SIGNS:  He is 5 feet 6 inches tall, weighing 167 pounds.  His blood
pressure is 122/70, pulse 88, respirations 18, temperature 98.6, pulse oximetry
is 96%.
HEENT:  Having congestion of nose and throat.  There is some swelling of throat,
but it is not closing and the patient is breathing very comfortably, lymph nodes
are not enlarged.  Ears are normal.
NECK:  Veins are not distended.
CHEST:  Clear.  No creps or rhonchi.  No wheezing.
HEART:  Regular, no murmur.
ABDOMEN:  Soft.  Liver and spleen not palpable.  No area of tenderness, no mass
palpable.
 
LABORATORY DATA:  His CBC shows white count 7200, hemoglobin 15.1.  Other values
are fairly normal.  Electrocardiogram shows multiple ventricular premature
complexes, borderline abnormality.  No evidence of myocardial infarction. 
Lactic acidosis, lactic acid 2.3.  Comprehensive metabolic profile, glucose 137,
calcium 8.1, C-reactive protein 0.54, which is slightly high, other values are
around normal.  Chest x-ray, low lung volume, but there is no acute
cardiopulmonary abnormality or chest abnormality seen.  Repeat lactic acid is 2,
which is normal and the patient is improving and feeling better.
 
                              [place], [place]
 
                               PATIENT HISTORY AND PHYSICAL EXAM
 
        NAME: TATI MIX                     [id number]  
        UNIT #: [id number]                        ROOM: [facility]    
        DOCTOR: MINO BARRERA,MIC GUAJARDO             BIRTHDATE: [birth year]
 
 
 
FINAL DIAGNOSES:  Angioneurotic edema with gastroesophageal reflux disease
syndrome with the history of snuff abuse and history of known ALLERGY TO DUST.
 
 
 
_________________________________
MIC HERZOG MD
 
CM:HISPHYS:PATIENT HISTORY AND PHYSICAL EXAMINATION
 
D: 01/01/19 1322
T: 01/01/19 1350
    
                                                            
MIC HERZOG MD            
                                                            
01/01/19
1405
                              
interface negative Affect and characteristics of appearance, verbalizations, behaviors are appropriate

## 2019-04-16 NOTE — ED PROCEDURE NOTE - NS ED ATTENDING STATEMENT MOD
Patient's sodium is 130 this morning and chloride is 94- Dr. Grazyna Santos paged with lab results. Attending Only

## 2019-06-04 PROBLEM — Z00.00 ENCOUNTER FOR PREVENTIVE HEALTH EXAMINATION: Status: ACTIVE | Noted: 2019-06-04

## 2019-06-13 ENCOUNTER — NON-APPOINTMENT (OUTPATIENT)
Age: 84
End: 2019-06-13

## 2019-06-13 ENCOUNTER — APPOINTMENT (OUTPATIENT)
Dept: CARDIOLOGY | Facility: CLINIC | Age: 84
End: 2019-06-13
Payer: MEDICARE

## 2019-06-13 VITALS
DIASTOLIC BLOOD PRESSURE: 74 MMHG | WEIGHT: 141 LBS | HEART RATE: 61 BPM | SYSTOLIC BLOOD PRESSURE: 159 MMHG | HEIGHT: 70 IN | BODY MASS INDEX: 20.19 KG/M2 | OXYGEN SATURATION: 99 %

## 2019-06-13 DIAGNOSIS — I10 ESSENTIAL (PRIMARY) HYPERTENSION: ICD-10-CM

## 2019-06-13 DIAGNOSIS — R73.03 PREDIABETES.: ICD-10-CM

## 2019-06-13 DIAGNOSIS — E78.5 HYPERLIPIDEMIA, UNSPECIFIED: ICD-10-CM

## 2019-06-13 DIAGNOSIS — Z82.49 FAMILY HISTORY OF ISCHEMIC HEART DISEASE AND OTHER DISEASES OF THE CIRCULATORY SYSTEM: ICD-10-CM

## 2019-06-13 DIAGNOSIS — I77.9 DISORDER OF ARTERIES AND ARTERIOLES, UNSPECIFIED: ICD-10-CM

## 2019-06-13 PROCEDURE — 99205 OFFICE O/P NEW HI 60 MIN: CPT

## 2019-06-13 PROCEDURE — 93000 ELECTROCARDIOGRAM COMPLETE: CPT

## 2019-06-13 RX ORDER — AMLODIPINE BESYLATE 2.5 MG/1
2.5 TABLET ORAL DAILY
Qty: 30 | Refills: 5 | Status: ACTIVE | COMMUNITY

## 2019-06-13 NOTE — HISTORY OF PRESENT ILLNESS
[FreeTextEntry1] : I saw Hernandez Garza in the office today for cardiac evaluation. He is an 87-year-old white male who has been treated for hypertension and  hyperlipidemia. Also has prediabetes. He is not a smoker and has no significant family history of premature heart disease. He went for a routine CT scan of the neck and was found to have a large plaque in his left caritid artery. He had Carotid Dopplers last year that showed a 50-69% occlusion. The patient has no symptoms.\par \par Past medical history is otherwise noncontributory. He does not do a lot of walking but has no chest pain, shortness of breath, lightheadedness, or palpitation. He used to be on low dose aspirin which was stopped because of nosebleeds. He has cauterization of his nose but apparently has some dry membranes at bleed.\par \par A  resting electrocardiogram demonstrates sinus rhythm. There is poor progression V1 to V3. The tracing is otherwise unremarkable. An echocardiogram in 2016  was normal.

## 2019-06-13 NOTE — REASON FOR VISIT
[Initial Evaluation] : an initial evaluation of [Carotid Artery Stenosis] : carotid stenosis [Hyperlipidemia] : hyperlipidemia [Hypertension] : hypertension

## 2019-06-13 NOTE — PHYSICAL EXAM
[General Appearance - Well Developed] : well developed [Well Groomed] : well groomed [Normal Appearance] : normal appearance [General Appearance - Well Nourished] : well nourished [No Deformities] : no deformities [General Appearance - In No Acute Distress] : no acute distress [Normal Conjunctiva] : the conjunctiva exhibited no abnormalities [Normal Oral Mucosa] : normal oral mucosa [Normal Jugular Venous A Waves Present] : normal jugular venous A waves present [Normal Jugular Venous V Waves Present] : normal jugular venous V waves present [No Jugular Venous Matthews A Waves] : no jugular venous matthews A waves [Normal Rate] : normal [Normal S1] : normal S1 [Normal S2] : normal S2 [No Murmur] : no murmurs heard [2+] : left 2+ [No Abnormalities] : the abdominal aorta was not enlarged and no bruit was heard [No Pitting Edema] : no pitting edema present [Respiration, Rhythm And Depth] : normal respiratory rhythm and effort [Exaggerated Use Of Accessory Muscles For Inspiration] : no accessory muscle use [Auscultation Breath Sounds / Voice Sounds] : lungs were clear to auscultation bilaterally [Bowel Sounds] : normal bowel sounds [Abdomen Soft] : soft [Abnormal Walk] : normal gait [Abdomen Tenderness] : non-tender [Nail Clubbing] : no clubbing of the fingernails [Gait - Sufficient For Exercise Testing] : the gait was sufficient for exercise testing [Cyanosis, Localized] : no localized cyanosis [Skin Color & Pigmentation] : normal skin color and pigmentation [Skin Turgor] : normal skin turgor [Oriented To Time, Place, And Person] : oriented to person, place, and time [] : no rash [Impaired Insight] : insight and judgment were intact [No Anxiety] : not feeling anxious [S3] : no S3 [S4] : no S4 [Right Carotid Bruit] : no bruit heard over the right carotid [Left Carotid Bruit] : no bruit heard over the left carotid [Right Femoral Bruit] : no bruit heard over the right femoral artery [Left Femoral Bruit] : no bruit heard over the left femoral artery

## 2019-06-13 NOTE — DISCUSSION/SUMMARY
[FreeTextEntry1] : This is an 87-year-old white male who has hypertension, diabetes, and hyperlipidemia. By CT scan of his neck he has a large and probably obstructing lesion of his left carotid artery. The patient will start low-dose aspirin. He is to have an angiogram. He would be best to have the doctor that is evaluating him for intervention decide on the angiogram. Interested and stenting at [facility] I gave them the name of Dr. Tristen Peres.\par \par In the meantime he'll stay on his present medication. He will need a stress test to rule out any significant underlying coronary disease and they would prefer to do the stress test at [facility] as well.\par \par I would appreciate your sending a copy of his most recent blood work for my records.\par \par The above was discussed in detail with the patient and his wife and I answered all of their questions.

## 2019-06-13 NOTE — REVIEW OF SYSTEMS
[Loss Of Hearing] : hearing loss [Nocturia] : nocturia [Joint Pain] : joint pain [Lower Back Pain] : lower back pain [Anxiety] : anxiety [Easy Bleeding] : a tendency for easy bleeding [Negative] : Genitourinary [Fever] : no fever [Chills] : no chills [Headache] : no headache [Blurry Vision] : no blurred vision [Feeling Fatigued] : not feeling fatigued [Dizziness] : no dizziness [Skin: A Rash] : no rash: [Seeing Double (Diplopia)] : no diplopia [Depression] : no depression [Easy Bruising] : no tendency for easy bruising

## 2019-07-05 ENCOUNTER — APPOINTMENT (OUTPATIENT)
Dept: CARDIOLOGY | Facility: CLINIC | Age: 84
End: 2019-07-05
Payer: MEDICARE

## 2019-07-05 PROCEDURE — 93015 CV STRESS TEST SUPVJ I&R: CPT

## 2019-07-05 PROCEDURE — 78452 HT MUSCLE IMAGE SPECT MULT: CPT

## 2019-07-05 PROCEDURE — A9500: CPT

## 2019-11-11 ENCOUNTER — EMERGENCY (EMERGENCY)
Facility: HOSPITAL | Age: 84
LOS: 1 days | Discharge: HOME | End: 2019-11-11
Attending: EMERGENCY MEDICINE | Admitting: EMERGENCY MEDICINE
Payer: MEDICARE

## 2019-11-11 VITALS
HEART RATE: 60 BPM | DIASTOLIC BLOOD PRESSURE: 65 MMHG | OXYGEN SATURATION: 100 % | TEMPERATURE: 98 F | RESPIRATION RATE: 15 BRPM | SYSTOLIC BLOOD PRESSURE: 150 MMHG

## 2019-11-11 VITALS
SYSTOLIC BLOOD PRESSURE: 169 MMHG | TEMPERATURE: 98 F | HEART RATE: 66 BPM | WEIGHT: 134.92 LBS | RESPIRATION RATE: 18 BRPM | OXYGEN SATURATION: 97 % | DIASTOLIC BLOOD PRESSURE: 74 MMHG

## 2019-11-11 LAB
CK SERPL-CCNC: 65 U/L — SIGNIFICANT CHANGE UP (ref 26–308)
HCT VFR BLD CALC: 41.1 % — SIGNIFICANT CHANGE UP (ref 39–50)
HGB BLD-MCNC: 14.6 G/DL — SIGNIFICANT CHANGE UP (ref 13–17)
MCHC RBC-ENTMCNC: 35.5 GM/DL — SIGNIFICANT CHANGE UP (ref 32–36)
MCHC RBC-ENTMCNC: 37.1 PG — HIGH (ref 27–34)
MCV RBC AUTO: 104.3 FL — HIGH (ref 80–100)
NRBC # BLD: 0 /100 WBCS — SIGNIFICANT CHANGE UP (ref 0–0)
PLATELET # BLD AUTO: 352 K/UL — SIGNIFICANT CHANGE UP (ref 150–400)
RBC # BLD: 3.94 M/UL — LOW (ref 4.2–5.8)
RBC # FLD: 12.6 % — SIGNIFICANT CHANGE UP (ref 10.3–14.5)
TROPONIN I SERPL-MCNC: <.015 NG/ML — SIGNIFICANT CHANGE UP (ref 0.01–0.04)
WBC # BLD: 4.94 K/UL — SIGNIFICANT CHANGE UP (ref 3.8–10.5)
WBC # FLD AUTO: 4.94 K/UL — SIGNIFICANT CHANGE UP (ref 3.8–10.5)

## 2019-11-11 PROCEDURE — 80048 BASIC METABOLIC PNL TOTAL CA: CPT

## 2019-11-11 PROCEDURE — 99284 EMERGENCY DEPT VISIT MOD MDM: CPT

## 2019-11-11 PROCEDURE — 85027 COMPLETE CBC AUTOMATED: CPT

## 2019-11-11 PROCEDURE — 93010 ELECTROCARDIOGRAM REPORT: CPT

## 2019-11-11 PROCEDURE — 93005 ELECTROCARDIOGRAM TRACING: CPT

## 2019-11-11 PROCEDURE — 96360 HYDRATION IV INFUSION INIT: CPT

## 2019-11-11 PROCEDURE — 36415 COLL VENOUS BLD VENIPUNCTURE: CPT

## 2019-11-11 PROCEDURE — 82553 CREATINE MB FRACTION: CPT

## 2019-11-11 PROCEDURE — 99284 EMERGENCY DEPT VISIT MOD MDM: CPT | Mod: 25

## 2019-11-11 PROCEDURE — 82550 ASSAY OF CK (CPK): CPT

## 2019-11-11 PROCEDURE — 84484 ASSAY OF TROPONIN QUANT: CPT

## 2019-11-11 PROCEDURE — 99285 EMERGENCY DEPT VISIT HI MDM: CPT

## 2019-11-11 RX ORDER — SODIUM CHLORIDE 9 MG/ML
1000 INJECTION INTRAMUSCULAR; INTRAVENOUS; SUBCUTANEOUS ONCE
Refills: 0 | Status: COMPLETED | OUTPATIENT
Start: 2019-11-11 | End: 2019-11-11

## 2019-11-11 RX ADMIN — SODIUM CHLORIDE 1000 MILLILITER(S): 9 INJECTION INTRAMUSCULAR; INTRAVENOUS; SUBCUTANEOUS at 09:54

## 2019-11-11 RX ADMIN — SODIUM CHLORIDE 1000 MILLILITER(S): 9 INJECTION INTRAMUSCULAR; INTRAVENOUS; SUBCUTANEOUS at 08:54

## 2019-11-11 NOTE — ED PROVIDER NOTE - OBJECTIVE STATEMENT
88 yo white male with H/O Anxiety    Hyperlipidemia and    Hypertension who states that over the past few months has been feeling weak and tired, has had poor appetite, feels like he has lost weight and gets lightheaded often when stands or lies down. Over the past few weeks has also been experiencing ill defined chest pain, no present at this time. No fever, chills, nausea, vomiting, diarrhea, cough, SOB or headache.

## 2019-11-11 NOTE — CONSULT NOTE ADULT - SUBJECTIVE AND OBJECTIVE BOX
[facility] - Ida Abarca, Steve, Vaughn, Rosalia, Winnie Hall  Office Number: [phone]    Initial Consult Note    CHIEF COMPLAINT: Patient is a 87y old  Male who presents with a chief complaint of chest tightness and dizziness.     HPI: 87M w/pmh of HLD and HTN presenting with 2 episodes of chest tightness and associated dizziness upon getting up from bed. States that the chest tightness was short lived and upon getting to the hospital, his symptoms have resolved. He did not take any additional medications to alleviate his symptoms.       PAST MEDICAL & SURGICAL HISTORY:  Anxiety  Hyperlipidemia  Hypertension  No significant past surgical history      SOCIAL HISTORY:  No tobacco, ethanol, or drug abuse.    FAMILY HISTORY:    No family history of acute MI or sudden cardiac death.    MEDICATIONS  (STANDING):    MEDICATIONS  (PRN):      Allergies    amoxicillin (Unknown)  enalapril (Unknown)    Intolerances        REVIEW OF SYSTEMS:    CONSTITUTIONAL: No weakness, fevers or chills  EYES/ENT: No visual changes;  No vertigo or throat pain   NECK: No pain or stiffness  RESPIRATORY: No cough, wheezing, hemoptysis; No shortness of breath  CARDIOVASCULAR: No chest pain or palpitations  GASTROINTESTINAL: No abdominal pain. No nausea, vomiting, or hematemesis; No diarrhea or constipation. No melena or hematochezia.  GENITOURINARY: No dysuria, frequency or hematuria  NEUROLOGICAL: No numbness or weakness  SKIN: No itching or rash  All other review of systems is negative unless indicated above    VITAL SIGNS:   Vital Signs Last 24 Hrs  T(C): 36.4 (11 Nov 2019 07:37), Max: 36.4 (11 Nov 2019 07:37)  T(F): 97.6 (11 Nov 2019 07:37), Max: 97.6 (11 Nov 2019 07:37)  HR: 56 (11 Nov 2019 11:28) (56 - 66)  BP: 160/70 (11 Nov 2019 11:28) (160/70 - 169/74)  BP(mean): --  RR: 16 (11 Nov 2019 11:28) (16 - 18)  SpO2: 100% (11 Nov 2019 11:28) (97% - 100%)    I&O's Summary      On Exam:    Constitutional: NAD, alert and oriented x 3  Lungs:  Non-labored, breath sounds are clear bilaterally, No wheezing, rales or rhonchi  Cardiovascular: RRR.  S1 and S2 positive.  No murmurs, rubs, gallops or clicks  Gastrointestinal: Bowel Sounds present, soft, nontender.   Lymph: No peripheral edema. No cervical lymphadenopathy.  Neurological: Alert, no focal deficits  Skin: No rashes or ulcers   Psych:  Mood & affect appropriate.    LABS: All Labs Reviewed:                        14.6   4.94  )-----------( 352      ( 11 Nov 2019 08:39 )             41.1     11 Nov 2019 08:39    140    |  106    |  23     ----------------------------<  150    4.0     |  28     |  0.94     Ca    8.8        11 Nov 2019 08:39        CARDIAC MARKERS ( 11 Nov 2019 08:39 )  <.015 ng/mL / x     / 65 U/L / x     / 1.6 ng/mL      Blood Culture:         RADIOLOGY:    EKG: [facility] - Ida Abarca, Steve, Vaughn, Rosalia, Winnie Hall  Office Number: [phone]    Initial Consult Note    CHIEF COMPLAINT: Patient is a 87y old  Male who presents with a chief complaint of chest tightness and dizziness.     HPI: 87M w/pmh of HLD and HTN presenting with 2 episodes of chest tightness and associated dizziness upon getting up from bed. States that the chest tightness was short lived and upon getting to the hospital, his symptoms have resolved. He did not take any additional medications to alleviate his symptoms. Spoke to PCP, Dr. Londono over phone who states that patient and wife are anxious. Patient has been staying hydrated, with no sick contacts or recent travel.       PAST MEDICAL & SURGICAL HISTORY:  Anxiety  Hyperlipidemia  Hypertension  No significant past surgical history      SOCIAL HISTORY:  No tobacco, ethanol, or drug abuse.    FAMILY HISTORY:    No family history of acute MI or sudden cardiac death.    MEDICATIONS  (STANDING): asa, amlodipine and fluvastatin    Allergies  amoxicillin (Unknown)  enalapril (Unknown)      REVIEW OF SYSTEMS: resolution of symptoms upon arrival to ER  CONSTITUTIONAL: No weakness, fevers or chills  EYES/ENT: No visual changes;  No vertigo or throat pain   NECK: No pain or stiffness  RESPIRATORY: No cough, wheezing, hemoptysis; No shortness of breath  CARDIOVASCULAR: No chest pain or palpitations  GASTROINTESTINAL: No abdominal pain. No nausea, vomiting, or hematemesis; No diarrhea or constipation. No melena or hematochezia.  GENITOURINARY: No dysuria, frequency or hematuria  NEUROLOGICAL: No numbness or weakness  SKIN: No itching or rash  All other review of systems is negative unless indicated above    VITAL SIGNS:   Vital Signs Last 24 Hrs  T(C): 36.4 (11 Nov 2019 07:37), Max: 36.4 (11 Nov 2019 07:37)  T(F): 97.6 (11 Nov 2019 07:37), Max: 97.6 (11 Nov 2019 07:37)  HR: 56 (11 Nov 2019 11:28) (56 - 66)  BP: 160/70 (11 Nov 2019 11:28) (160/70 - 169/74)  RR: 16 (11 Nov 2019 11:28) (16 - 18)  SpO2: 100% (11 Nov 2019 11:28) (97% - 100%)      On Exam:  Constitutional: NAD, alert and oriented x 3  Lungs:  Non-labored, breath sounds are clear bilaterally, No wheezing, rales or rhonchi  Cardiovascular: RRR.  S1 and S2 positive.  No murmurs, rubs, gallops or clicks  Gastrointestinal: Bowel Sounds present, soft, nontender.   Lymph: No peripheral edema.  Neurological: Alert, no focal deficits  Skin: No rashes or ulcers   Psych:  Mood & affect appropriate.    LABS: All Labs Reviewed:                        14.6   4.94  )-----------( 352      ( 11 Nov 2019 08:39 )             41.1     11 Nov 2019 08:39    140    |  106    |  23     ----------------------------<  150    4.0     |  28     |  0.94     Ca    8.8        11 Nov 2019 08:39    CARDIAC MARKERS ( 11 Nov 2019 08:39 )  <.015 ng/mL / x     / 65 U/L / x     / 1.6 ng/mL    EKG: NSR, HR 61, no ST elevation/depressions [facility] - Ida Abarca, Steve, Vaughn, Rosalia, Winnie Hall  Office Number: [phone]    Initial Consult Note    CHIEF COMPLAINT: Patient is a 87y old  Male who presents with a chief complaint of chest tightness and dizziness.     HPI: 87M w/pmh of HLD and HTN presenting with 2 episodes of chest tightness and associated dizziness upon getting up from bed. States that the chest tightness was short lived and upon getting to the hospital, his symptoms have resolved. He did not take any additional medications to alleviate his symptoms. Spoke to PCP, Dr. Londono over phone who states that patient and wife are anxious. Patient has been staying hydrated, with no sick contacts or recent travel.       PAST MEDICAL & SURGICAL HISTORY:  Anxiety  Hyperlipidemia  Hypertension  No significant past surgical history      SOCIAL HISTORY:  No tobacco, ethanol, or drug abuse.    FAMILY HISTORY:    No family history of acute MI or sudden cardiac death.    MEDICATIONS  (STANDING): asa, amlodipine and fluvastatin    Allergies  amoxicillin (Unknown)  enalapril (Unknown)      REVIEW OF SYSTEMS: resolution of symptoms upon arrival to ER  CONSTITUTIONAL: No weakness, fevers or chills  EYES/ENT: No visual changes;  No vertigo or throat pain   NECK: No pain or stiffness  RESPIRATORY: No cough, wheezing, hemoptysis; No shortness of breath  CARDIOVASCULAR: No chest pain or palpitations  GASTROINTESTINAL: No abdominal pain. No nausea, vomiting, or hematemesis; No diarrhea or constipation. No melena or hematochezia.  GENITOURINARY: No dysuria, frequency or hematuria  NEUROLOGICAL: No numbness or weakness  SKIN: No itching or rash  All other review of systems is negative unless indicated above    VITAL SIGNS:   Vital Signs Last 24 Hrs  T(C): 36.4 (11 Nov 2019 07:37), Max: 36.4 (11 Nov 2019 07:37)  T(F): 97.6 (11 Nov 2019 07:37), Max: 97.6 (11 Nov 2019 07:37)  HR: 56 (11 Nov 2019 11:28) (56 - 66)  BP: 160/70 (11 Nov 2019 11:28) (160/70 - 169/74)  RR: 16 (11 Nov 2019 11:28) (16 - 18)  SpO2: 100% (11 Nov 2019 11:28) (97% - 100%)      On Exam:  Constitutional: NAD, alert and oriented x 3  HEENT MMM anicteric  Lungs:  Non-labored, breath sounds are clear bilaterally, No wheezing, rales or rhonchi  Cardiovascular: RRR.  S1 and S2 positive.  No murmurs, rubs, gallops or clicks  Gastrointestinal: Bowel Sounds present, soft, nontender.   Lymph: No peripheral edema.  Neurological: Alert, no focal deficits  Skin: No rashes or ulcers   Psych:  Mood & affect anxious    LABS: All Labs Reviewed:                        14.6   4.94  )-----------( 352      ( 11 Nov 2019 08:39 )             41.1     11 Nov 2019 08:39    140    |  106    |  23     ----------------------------<  150    4.0     |  28     |  0.94     Ca    8.8        11 Nov 2019 08:39    CARDIAC MARKERS ( 11 Nov 2019 08:39 )  <.015 ng/mL / x     / 65 U/L / x     / 1.6 ng/mL    EKG: NSR, HR 61, no ST elevation/depressions

## 2019-11-11 NOTE — ED ADULT NURSE NOTE - OBJECTIVE STATEMENT
Presents to ER with weakness x 1 month. Presents to ER with weakness and chest heaviness x 1 month.  STAT EKG obtained, placed on continuous cardiac monitoring.

## 2019-11-11 NOTE — ED PROVIDER NOTE - PROGRESS NOTE DETAILS
SEEN AND EVALUATED DR. FROST FROM CARDIOLOGY AND FELT GOOD TO GO HOME AND TO BE FOLLOWED AS OUTPATIENT.

## 2019-11-11 NOTE — ED PROVIDER NOTE - CARE PROVIDER_API CALL
Donato Hall)  Internal Medicine  [address] [place] [address]  Phone: [phone]  Fax: [phone]  Follow Up Time:

## 2019-11-11 NOTE — ED ADULT NURSE REASSESSMENT NOTE - NS ED NURSE REASSESS COMMENT FT1
Pt refusing to wait for cardiology consult, stating he feels better. Dr Diallo aware. Pt refusing to wait for cardiology consult, stating he feels better. Wife becoming increasingly angry that a cardiologist has not shown up yet. Dr Diallo aware.  Residents called stating they will come now to see pt. Pt refusing to wait for cardiology consult, stating he feels better. Wife becoming increasingly angry that a cardiologist has not shown up yet. Dr Diallo aware.  Residents called STAT stating they will come now to see pt.  Wife made aware that pt came in with chest pressure and should be evaluated by a cardiologist before going home.  Wife stating "this place is ridiculous and is unhappy with the wait."

## 2019-11-11 NOTE — CONSULT NOTE ADULT - ASSESSMENT
87y old  Male who presents with a chief complaint of chest tightness and dizziness. Symptoms occurred upon getting up from bed this morning and have resolved upon arrival to ER.     Doubt ACS, likely vasovagal vs. anxiety vs. hypertension related    - No clear evidence of acute ischemia, trops negative x1  - No evidence of volume overload   - No acute changes on EKG compared to previous (2017)  - ECHO in 2016 normal as per chart review  - BP elevated, sbp 180's with improvement to 160's. Recommend follow up with PCP for bp check upon discharge. Continue amlodipine 2.5mg qd.   - Continue asa and statin  - Pt to be discharged with outpatient follow up with PCP and cardiologist, Dr. Abarca 87y old  Male who presents with a chief complaint of chest tightness and dizziness. Symptoms occurred upon getting up from bed this morning and have resolved upon arrival to ER.     - Symptoms are very nonanginal.  - No clear evidence of acute ischemia, trops negative x1  - No evidence of volume overload   - No acute changes on EKG compared to previous (2017)  - ECHO in 2016 normal as per chart review  - BP elevated, sbp 180's with improvement to 160's. Recommend follow up with PCP for bp check upon discharge. Continue amlodipine 2.5mg qd. may need to be uptitrated  - Pt also very anxious which could be exacerbating symptoms.   - Continue asa and statin  - Pt to be discharged with outpatient follow up with PCP and cardiologist, Dr. Abarca

## 2019-11-11 NOTE — ED PROVIDER NOTE - PATIENT PORTAL LINK FT
You can access the FollowMyHealth Patient Portal offered by [facility] by registering at the following website: [email or web address]. By joining [facility]’s FollowMyHealth portal, you will also be able to view your health information using other applications (apps) compatible with our system.

## 2019-11-11 NOTE — CONSULT NOTE ADULT - ATTENDING COMMENTS
I saw and examined the patient personally. Spoke with above provider regarding this case. I reviewed the above findings completely.  I agree with the above history, physical, and plan which I have edited where appropriate.    cp is nonanginal.   ce neg. ekg without ischemic changes. pt very anxious.   cont meds. f/u with dr olvera.

## 2019-11-11 NOTE — ED ADULT NURSE NOTE - NSIMPLEMENTINTERV_GEN_ALL_ED
Implemented All Fall with Harm Risk Interventions:  [place] to call system. Call bell, personal items and telephone within reach. Instruct patient to call for assistance. Room bathroom lighting operational. Non-slip footwear when patient is off stretcher. Physically safe environment: no spills, clutter or unnecessary equipment. Stretcher in lowest position, wheels locked, appropriate side rails in place. Provide visual cue, wrist band, yellow gown, etc. Monitor gait and stability. Monitor for mental status changes and reorient to person, place, and time. Review medications for side effects contributing to fall risk. Reinforce activity limits and safety measures with patient and family. Provide visual clues: red socks.

## 2019-11-11 NOTE — ED PROVIDER NOTE - CONSTITUTIONAL, MLM
normal... Weak appearing, well nourished, awake, alert, oriented to person, place, time/situation and in no apparent distress.

## 2020-02-02 ENCOUNTER — EMERGENCY (EMERGENCY)
Facility: HOSPITAL | Age: 85
LOS: 1 days | Discharge: HOME | End: 2020-02-02
Attending: EMERGENCY MEDICINE | Admitting: EMERGENCY MEDICINE
Payer: MEDICARE

## 2020-02-02 VITALS
DIASTOLIC BLOOD PRESSURE: 67 MMHG | TEMPERATURE: 98 F | RESPIRATION RATE: 16 BRPM | SYSTOLIC BLOOD PRESSURE: 158 MMHG | OXYGEN SATURATION: 99 % | HEART RATE: 62 BPM

## 2020-02-02 VITALS
RESPIRATION RATE: 15 BRPM | OXYGEN SATURATION: 99 % | DIASTOLIC BLOOD PRESSURE: 74 MMHG | HEART RATE: 67 BPM | SYSTOLIC BLOOD PRESSURE: 194 MMHG | HEIGHT: 70 IN | TEMPERATURE: 98 F | WEIGHT: 139.99 LBS

## 2020-02-02 PROCEDURE — 99284 EMERGENCY DEPT VISIT MOD MDM: CPT

## 2020-02-02 PROCEDURE — 72125 CT NECK SPINE W/O DYE: CPT | Mod: 26

## 2020-02-02 PROCEDURE — 70450 CT HEAD/BRAIN W/O DYE: CPT | Mod: 26

## 2020-02-02 PROCEDURE — 72125 CT NECK SPINE W/O DYE: CPT

## 2020-02-02 PROCEDURE — 70450 CT HEAD/BRAIN W/O DYE: CPT

## 2020-02-02 PROCEDURE — 99284 EMERGENCY DEPT VISIT MOD MDM: CPT | Mod: 25

## 2020-02-02 RX ORDER — FLUVASTATIN SODIUM 80 MG/1
1 TABLET, FILM COATED, EXTENDED RELEASE ORAL
Qty: 0 | Refills: 0 | DISCHARGE

## 2020-02-02 RX ORDER — ACETAMINOPHEN 500 MG
650 TABLET ORAL ONCE
Refills: 0 | Status: COMPLETED | OUTPATIENT
Start: 2020-02-02 | End: 2020-02-02

## 2020-02-02 RX ADMIN — Medication 650 MILLIGRAM(S): at 21:40

## 2020-02-02 RX ADMIN — Medication 650 MILLIGRAM(S): at 20:54

## 2020-02-02 NOTE — ED PROVIDER NOTE - CARE PLAN
Principal Discharge DX:	Fall, initial encounter  Secondary Diagnosis:	Hematoma of scalp, initial encounter

## 2020-02-02 NOTE — ED ADULT NURSE NOTE - OBJECTIVE STATEMENT
Patient stated he tripped and fell sustained a bump to back of head no LOC was seen and evaluated by MD with order for CT scan brought to CT at this time with wife at the bedside.

## 2020-02-02 NOTE — ED PROVIDER NOTE - OBJECTIVE STATEMENT
87 male presents to ER with wife states he slipped and fell in the kitchen hitting back of head, denies LOC, no syncope, no vo 87 male presents to ER with wife states he slipped and fell in the kitchen hitting back of head, denies LOC, no syncope, no vomiting.

## 2020-02-02 NOTE — ED ADULT NURSE REASSESSMENT NOTE - NS ED NURSE REASSESS COMMENT FT1
Patient brought back to CT scan awaiting result kept comfortable in bed provided pillow call bell within reach with wife at the bedside.

## 2020-02-02 NOTE — ED PROVIDER NOTE - NSFOLLOWUPINSTRUCTIONS_ED_ALL_ED_FT
Follow up with your primary care doctor.    Fall Prevention in the Home, Adult  Falls can cause injuries and can affect people from all age groups. There are many simple things that you can do to make your home safe and to help prevent falls. Ask for help when making these changes, if needed.  What actions can I take to prevent falls?  General instructions     Use good lighting in all rooms. Replace any light bulbs that burn out.Turn on lights if it is dark. Use night-lights.Place frequently used items in easy-to-reach places. Lower the shelves around your home if necessary.Set up furniture so that there are clear paths around it. Avoid moving your furniture around.Remove throw rugs and other tripping hazards from the floor.Avoid walking on wet floors.Fix any uneven floor surfaces.Add color or contrast paint or tape to grab bars and handrails in your home. Place contrasting color strips on the first and last steps of stairways.When you use a stepladder, make sure that it is completely opened and that the sides are firmly locked. Have someone hold the ladder while you are using it. Do not climb a closed stepladder.Be aware of any and all pets.What can I do in the bathroom?               Keep the floor dry. Immediately clean up any water that spills onto the floor.Remove soap buildup in the tub or shower on a regular basis.Use non-skid mats or decals on the floor of the tub or shower.Attach bath mats securely with double-sided, non-slip rug tape.If you need to sit down while you are in the shower, use a plastic, non-slip stool.Install grab bars by the toilet and in the tub and shower. Do not use towel bars as grab bars.What can I do in the bedroom?     Make sure that a bedside light is easy to reach.Do not use oversized bedding that drapes onto the floor.Have a firm chair that has side arms to use for getting dressed.What can I do in the kitchen?     Clean up any spills right away.If you need to reach for something above you, use a sturdy step stool that has a grab bar.Keep electrical cables out of the way.Do not use floor polish or wax that makes floors slippery. If you must use wax, make sure that it is non-skid floor wax.What can I do in the stairways?     Do not leave any items on the stairs.Make sure that you have a light switch at the top of the stairs and the bottom of the stairs. Have them installed if you do not have them.Make sure that there are handrails on both sides of the stairs. Fix handrails that are broken or loose. Make sure that handrails are as long as the stairways.Install non-slip stair treads on all stairs in your home.Avoid having throw rugs at the top or bottom of stairways, or secure the rugs with carpet tape to prevent them from moving.Choose a carpet design that does not hide the edge of steps on the stairway.Check any carpeting to make sure that it is firmly attached to the stairs. Fix any carpet that is loose or worn.What can I do on the outside of my home?     Use bright outdoor lighting.Regularly repair the edges of walkways and driveways and fix any cracks.Remove high doorway thresholds.Trim any shrubbery on the main path into your home.Regularly check that handrails are securely fastened and in good repair. Both sides of any steps should have handrails.Install guardrails along the edges of any raised decks or porches.Clear walkways of debris and clutter, including tools and rocks.Have leaves, snow, and ice cleared regularly.Use sand or salt on walkways during winter months.In the garage, clean up any spills right away, including grease or oil spills.What other actions can I take?     Wear closed-toe shoes that fit well and support your feet. Wear shoes that have rubber soles or low heels.Use mobility aids as needed, such as canes, walkers, scooters, and crutches.Review your medicines with your health care provider. Some medicines can cause dizziness or changes in blood pressure, which increase your risk of falling.Talk with your health care provider about other ways that you can decrease your risk of falls. This may include working with a physical therapist or  to improve your strength, balance, and endurance.  Where to find more information  Centers for Disease Control and Prevention[place]: https://www.cdc.govNational [place] on Aging: https://ah3cmos.jc.nih.govContact a health care provider if:  You are afraid of falling at home.You feel weak, drowsy, or dizzy at home.You fall at home.Summary  There are many simple things that you can do to make your home safe and to help prevent falls.Ways to make your home safe include removing tripping hazards and installing grab bars in the bathroom.Ask for help when making these changes in your home.This information is not intended to replace advice given to you by your health care provider. Make sure you discuss any questions you have with your health care provider.    Hematoma  A hematoma is a collection of blood under the skin, in an organ, in a body space, in a joint space, or in other tissue. The blood can thicken (clot) to form a lump that you can see and feel. The lump is often firm and may become sore and tender. Most hematomas get better in a few days to weeks. However, some hematomas may be serious and require medical care. Hematomas can range from very small to very large.  What are the causes?  This condition is caused by:  A blunt or penetrating injury.A leakage from a blood vessel under the skin.Some medical procedures, including surgeries, such as oral surgery, face lifts, and surgeries on the joints.Some medical conditions that cause bleeding or bruising. There may be multiple hematomas that appear in different areas of the body.What increases the risk?  You are more likely to develop this condition if:  You are an older adult.You use blood thinners.What are the signs or symptoms?     Symptoms of this condition depend on where the hematoma is located.   Common symptoms of a hematoma that is under the skin include:  A firm lump on the body.Pain and tenderness in the area.Bruising. Blue, dark blue, purple-red, or yellowish skin (discoloration) may appear at the site of the hematoma if the hematoma is close to the surface of the skin.Common symptoms of a hematoma that is deep in the tissues or body spaces may be less obvious. They include:  A collection of blood in the stomach (intra-abdominal hematoma). This may cause pain in the abdomen, weakness, fainting, and shortness of breath. A collection of blood in the head (intracranial hematoma). This may cause a headache or symptoms such as weakness, trouble speaking or understanding, or a change in consciousness. How is this diagnosed?  This condition is diagnosed based on:  Your medical history.A physical exam.Imaging tests, such as an ultrasound or CT scan. These may be needed if your health care provider suspects a hematoma in deeper tissues or body spaces.Blood tests. These may be needed if your health care provider believes that the hematoma is caused by a medical condition.How is this treated?  Treatment for this condition depends on the cause, size, and location of the hematoma. Treatment may include:  Doing nothing. The majority of hematomas do not need treatment as many of them go away on their own over time.Surgery or close monitoring. This may be needed for large hematomas or hematomas that affect vital organs.Medicines. Medicines may be given if there is an underlying medical cause for the hematoma.Follow these instructions at home:  Managing pain, stiffness, and swelling        If directed, put ice on the affected area.  Put ice in a plastic bag.Place a towel between your skin and the bag.Leave the ice on for 20 minutes, 2–3 times a day for the first couple of days.If directed, apply heat to the affected area after applying ice for a couple of days. Use the heat source that your health care provider recommends, such as a moist heat pack or a heating pad.  Place a towel between your skin and the heat source. Leave the heat on for 20–30 minutes. Remove the heat if your skin turns bright red. This is especially important if you are unable to feel pain, heat, or cold. You may have a greater risk of getting burned.Raise (elevate) the affected area above the level of your heart while you are sitting or lying down.If told, wrap the affected area with an elastic bandage. The bandage applies pressure (compression) to the area, which may help to reduce swelling and promote healing. Do not wrap the bandage too tightly around the affected area.If your hematoma is on a leg or foot (lower extremity) and is painful, your health care provider may recommend crutches. Use them as told by your health care provider.General instructions     Take over-the-counter and prescription medicines only as told by your health care provider.Keep all follow-up visits as told by your health care provider. This is important.Contact a health care provider if:  You have a fever.The swelling or discoloration gets worse.You develop more hematomas.Get help right away if:  Your pain is worse or your pain is not controlled with medicine.Your skin over the hematoma breaks or starts bleeding.Your hematoma is in your chest or abdomen and you have weakness, shortness of breath, or a change in consciousness.You have a hematoma on your scalp that is caused by a fall or injury, and you also have:  A headache that gets worse.Trouble speaking or understanding speech. Weakness.Change in alertness or consciousness.Summary  A hematoma is a collection of blood under the skin, in an organ, in a body space, in a joint space, or in other tissue.This condition usually does not need treatment because many hematomas go away on their own over time.Large hematomas, or those that may affect vital organs, may need surgical drainage or monitoring. If the hematoma is caused by a medical condition, medicines may be prescribed.Get help right away if your hematoma breaks or starts to bleed, you have shortness of breath, or you have a headache or trouble speaking after a fall.This information is not intended to replace advice given to you by your health care provider. Make sure you discuss any questions you have with your health care provider.    Document Released: 08/01/2005 Document Revised: 05/23/2019 Document Reviewed: 05/23/2019  [employer] Interactive Patient Education © 2019 [employer]

## 2020-02-02 NOTE — ED ADULT NURSE NOTE - RESPIRATORY WDL
Ventricular Rate : 67  Atrial Rate : 67  P-R Interval : 186  QRS Duration : 86  Q-T Interval : 414  QTC Calculation(Bezet) : 437  P Axis : 39  R Axis : -21  T Axis : 15  Diagnosis : Normal sinus rhythm  Normal ECG  When compared with ECG of 22-JUL-2014 19:02,  Nonspecific T wave abnormality now evident in Anterior leads  Confirmed by EMMA MORENO MD ([id number]) on 5/5/2019 12:03:46 AM  
Breathing spontaneous and unlabored. Breath sounds clear and equal bilaterally with regular rhythm.

## 2020-02-02 NOTE — ED PROVIDER NOTE - PATIENT PORTAL LINK FT
You can access the FollowMyHealth Patient Portal offered by [facility] by registering at the following website: [email or web address]. By joining [facility]’s FollowMyHealth portal, you will also be able to view your health information using other applications (apps) compatible with our system.

## 2020-02-05 NOTE — ED PROVIDER NOTE - NEUROLOGICAL, MLM
PRINCIPAL DISCHARGE DIAGNOSIS  Diagnosis: ACC/AHA stage C congestive heart failure due to ischemic cardiomyopathy  Assessment and Plan of Treatment:       SECONDARY DISCHARGE DIAGNOSES  Diagnosis: LBBB (left bundle branch block)  Assessment and Plan of Treatment: No motor or sensory deficits.

## 2020-02-14 NOTE — ED PROVIDER NOTE - CROS ED ENMT ALL NEG
PHYSICIAN NEXT STEPS:  Call the Patient    CHIEF COMPLAINT:  Chief Complaint/Protocol Used: No Guideline Available  Onset: 2/6/20      ASSESSMENT:  ? Onset: 2/6/20  ? Normal True  ? Normal, no trouble breathing True  ? Reason For Call: Loss of appetite  ? Onset: 2/6/2020 after colonoscopy  ? Severity: Eating very little-1 meal a day, drinking fluids  ? Fever: No  ? Other Symptoms: None  -------------------------------------------------------    DISPOSITION:  Disposition Recommendation: See Physician within 24 Hours  Questions that led to disposition:  ? Nursing judgment or information in reference  Patient Directed To: Unspecified  Patient Intended Action: Talk with my doctor      CALL NOTES:  02/14/2020 at 3:57 PM by Diamante Orellana  ? No available appointment today or tomorrow at [facility] and patient requests to speak with Dr. Julien Johnston. Dr. Julien Johnston perfect serve paged to patient as requested.      DISPOSITION OVERRIDE/PROVIDER CONSULT:  Disposition Override: N/A  Override Source: Unspecified  Consulted with PCP: No  Consulted with On-Call Physician: No    CALLER CONTACT INFO:  Name: Mabel staila technologiestrey (Self)  Phone 1: [phone] (Mobile) - Preferred  Phone 2: [phone] (Home Phone)      ENCOUNTER STARTED:  02/14/20 03:38:51 PM  ENCOUNTER ASSIGNED TO/CLOSED BY:  Diamante Orellana @ 02/14/20 03:57:47 PM      -------------------------------------------------------    -------------------------------------------------------
negative...

## 2021-05-05 ENCOUNTER — APPOINTMENT (OUTPATIENT)
Dept: OTOLARYNGOLOGY | Facility: CLINIC | Age: 86
End: 2021-05-05

## 2021-05-12 ENCOUNTER — APPOINTMENT (OUTPATIENT)
Dept: OTOLARYNGOLOGY | Facility: CLINIC | Age: 86
End: 2021-05-12

## 2021-08-21 NOTE — ED ADULT NURSE NOTE - PAIN RATING/NUMBER SCALE (0-10): ACTIVITY
Report given to [facility] AND [facility] from Kris Lizarraga. Report method in person   The following was reviewed with receiving RN:   Current vital signs:  BP (!) 153/88   Pulse 79   Temp 97.8 °F (36.6 °C) (Oral)   Resp 18   Ht 5' (1.524 m)   Wt 147 lb (66.7 kg)   LMP 08/03/2021   SpO2 100%   BMI 28.71 kg/m²                MEWS Score: 1     Any medication or safety alerts were reviewed. Any pending diagnostics and notifications were also reviewed, as well as any safety concerns or issues, abnormal labs, abnormal imaging, and abnormal assessment findings. Questions were answered.           Manuel Katz RN  08/21/21 [id number]

## 2021-12-27 ENCOUNTER — OUTPATIENT (OUTPATIENT)
Dept: OUTPATIENT SERVICES | Facility: HOSPITAL | Age: 86
LOS: 1 days | End: 2021-12-27
Payer: MEDICARE

## 2021-12-27 VITALS
HEART RATE: 62 BPM | OXYGEN SATURATION: 100 % | SYSTOLIC BLOOD PRESSURE: 130 MMHG | DIASTOLIC BLOOD PRESSURE: 48 MMHG | TEMPERATURE: 97 F | HEIGHT: 70 IN | WEIGHT: 130.07 LBS | RESPIRATION RATE: 16 BRPM

## 2021-12-27 DIAGNOSIS — Z01.818 ENCOUNTER FOR OTHER PREPROCEDURAL EXAMINATION: ICD-10-CM

## 2021-12-27 DIAGNOSIS — Z98.890 OTHER SPECIFIED POSTPROCEDURAL STATES: Chronic | ICD-10-CM

## 2021-12-27 DIAGNOSIS — C43.72 MALIGNANT MELANOMA OF LEFT LOWER LIMB, INCLUDING HIP: ICD-10-CM

## 2021-12-27 LAB
A1C WITH ESTIMATED AVERAGE GLUCOSE RESULT: 6.1 % — HIGH (ref 4–5.6)
ANION GAP SERPL CALC-SCNC: 5 MMOL/L — SIGNIFICANT CHANGE UP (ref 5–17)
BUN SERPL-MCNC: 21 MG/DL — SIGNIFICANT CHANGE UP (ref 7–23)
CALCIUM SERPL-MCNC: 9 MG/DL — SIGNIFICANT CHANGE UP (ref 8.5–10.1)
CHLORIDE SERPL-SCNC: 105 MMOL/L — SIGNIFICANT CHANGE UP (ref 96–108)
CO2 SERPL-SCNC: 29 MMOL/L — SIGNIFICANT CHANGE UP (ref 22–31)
CREAT SERPL-MCNC: 1 MG/DL — SIGNIFICANT CHANGE UP (ref 0.5–1.3)
ESTIMATED AVERAGE GLUCOSE: 128 MG/DL — HIGH (ref 68–114)
GLUCOSE SERPL-MCNC: 158 MG/DL — HIGH (ref 70–99)
HCT VFR BLD CALC: 40.2 % — SIGNIFICANT CHANGE UP (ref 39–50)
HGB BLD-MCNC: 14.4 G/DL — SIGNIFICANT CHANGE UP (ref 13–17)
MCHC RBC-ENTMCNC: 35.8 GM/DL — SIGNIFICANT CHANGE UP (ref 32–36)
MCHC RBC-ENTMCNC: 37.1 PG — HIGH (ref 27–34)
MCV RBC AUTO: 103.6 FL — HIGH (ref 80–100)
NRBC # BLD: 0 /100 WBCS — SIGNIFICANT CHANGE UP (ref 0–0)
PLATELET # BLD AUTO: 305 K/UL — SIGNIFICANT CHANGE UP (ref 150–400)
POTASSIUM SERPL-MCNC: 4.5 MMOL/L — SIGNIFICANT CHANGE UP (ref 3.5–5.3)
POTASSIUM SERPL-SCNC: 4.5 MMOL/L — SIGNIFICANT CHANGE UP (ref 3.5–5.3)
RBC # BLD: 3.88 M/UL — LOW (ref 4.2–5.8)
RBC # FLD: 12.9 % — SIGNIFICANT CHANGE UP (ref 10.3–14.5)
SODIUM SERPL-SCNC: 139 MMOL/L — SIGNIFICANT CHANGE UP (ref 135–145)
WBC # BLD: 5.22 K/UL — SIGNIFICANT CHANGE UP (ref 3.8–10.5)
WBC # FLD AUTO: 5.22 K/UL — SIGNIFICANT CHANGE UP (ref 3.8–10.5)

## 2021-12-27 PROCEDURE — 80048 BASIC METABOLIC PNL TOTAL CA: CPT

## 2021-12-27 PROCEDURE — 83036 HEMOGLOBIN GLYCOSYLATED A1C: CPT

## 2021-12-27 PROCEDURE — 93010 ELECTROCARDIOGRAM REPORT: CPT

## 2021-12-27 PROCEDURE — 93005 ELECTROCARDIOGRAM TRACING: CPT

## 2021-12-27 PROCEDURE — 36415 COLL VENOUS BLD VENIPUNCTURE: CPT

## 2021-12-27 PROCEDURE — 85027 COMPLETE CBC AUTOMATED: CPT

## 2021-12-27 PROCEDURE — G0463: CPT

## 2021-12-27 RX ORDER — ASPIRIN/CALCIUM CARB/MAGNESIUM 324 MG
1 TABLET ORAL
Qty: 0 | Refills: 0 | DISCHARGE

## 2021-12-27 RX ORDER — ATORVASTATIN CALCIUM 80 MG/1
1 TABLET, FILM COATED ORAL
Qty: 0 | Refills: 0 | DISCHARGE

## 2021-12-27 RX ORDER — AMLODIPINE BESYLATE 2.5 MG/1
1 TABLET ORAL
Qty: 0 | Refills: 0 | DISCHARGE

## 2021-12-27 NOTE — H&P PST ADULT - PROBLEM SELECTOR PLAN 2
Labs - CBC, BMP and EKG. COVID PCR 48-72 before DOS.   MC with Dr. Londono  Pre op and Hibiclens instructions reviewed and given to wife. Continue routine meds at night. Xanax as needed DOS with sip of water. Instructed to hold and/or avoid other NSAIDs and OTC supplements. Tylenol is ok. Verbalized understanding

## 2021-12-27 NOTE — H&P PST ADULT - HISTORY OF PRESENT ILLNESS
89 yo male with HTN, HLD, T2DM and Dementia, presents to PST with his wife, scheduled for a wide excision malignant melanoma left lower leg  89 yo male with HTN, HLD, T2DM, Skin cancer and Dementia, presents to PST with his wife, scheduled for a wide excision malignant melanoma left lower leg on 1/6/2022 with Dr. Gardiner. Wife reports a lesion on the left lower leg, that was staged 0 on a recent biopsy. Followed by dermatology semi-annually secondary to H/O basal and squamous cells in the past.

## 2021-12-27 NOTE — H&P PST ADULT - NSANTHOSAYNRD_GEN_A_CORE
No. WILNER screening performed.  STOP BANG Legend: 0-2 = LOW Risk; 3-4 = INTERMEDIATE Risk; 5-8 = HIGH Risk

## 2021-12-27 NOTE — H&P PST ADULT - FALL HARM RISK - HARM RISK INTERVENTIONS

## 2021-12-27 NOTE — H&P PST ADULT - NSICDXPASTMEDICALHX_GEN_ALL_CORE_FT
PAST MEDICAL HISTORY:  Anxiety     Dementia     DM (diabetes mellitus)     Hyperlipidemia     Hypertension      PAST MEDICAL HISTORY:  Anxiety     Cancer of skin     Dementia     DM (diabetes mellitus)     Hyperlipidemia     Hypertension     Malignant melanoma of left lower limb, including hip     Mild CAD carotids

## 2021-12-30 PROBLEM — F03.90 UNSPECIFIED DEMENTIA, UNSPECIFIED SEVERITY, WITHOUT BEHAVIORAL DISTURBANCE, PSYCHOTIC DISTURBANCE, MOOD DISTURBANCE, AND ANXIETY: Chronic | Status: ACTIVE | Noted: 2021-12-27

## 2021-12-30 PROBLEM — I25.10 ATHEROSCLEROTIC HEART DISEASE OF NATIVE CORONARY ARTERY WITHOUT ANGINA PECTORIS: Chronic | Status: ACTIVE | Noted: 2021-12-27

## 2021-12-30 PROBLEM — C44.90 UNSPECIFIED MALIGNANT NEOPLASM OF SKIN, UNSPECIFIED: Chronic | Status: ACTIVE | Noted: 2021-12-27

## 2021-12-30 PROBLEM — E11.9 TYPE 2 DIABETES MELLITUS WITHOUT COMPLICATIONS: Chronic | Status: ACTIVE | Noted: 2021-12-27

## 2021-12-30 PROBLEM — C43.72 MALIGNANT MELANOMA OF LEFT LOWER LIMB, INCLUDING HIP: Chronic | Status: ACTIVE | Noted: 2021-12-27

## 2022-01-04 ENCOUNTER — OUTPATIENT (OUTPATIENT)
Dept: OUTPATIENT SERVICES | Facility: HOSPITAL | Age: 87
LOS: 1 days | End: 2022-01-04
Payer: MEDICARE

## 2022-01-04 DIAGNOSIS — Z98.890 OTHER SPECIFIED POSTPROCEDURAL STATES: Chronic | ICD-10-CM

## 2022-01-04 DIAGNOSIS — Z20.828 CONTACT WITH AND (SUSPECTED) EXPOSURE TO OTHER VIRAL COMMUNICABLE DISEASES: ICD-10-CM

## 2022-01-04 LAB — SARS-COV-2 RNA SPEC QL NAA+PROBE: SIGNIFICANT CHANGE UP

## 2022-01-04 PROCEDURE — U0005: CPT

## 2022-01-04 PROCEDURE — U0003: CPT

## 2022-01-05 ENCOUNTER — TRANSCRIPTION ENCOUNTER (OUTPATIENT)
Age: 87
End: 2022-01-05

## 2022-01-05 NOTE — ASU PATIENT PROFILE, ADULT - FALL HARM RISK - HARM RISK INTERVENTIONS

## 2022-01-05 NOTE — ASU PATIENT PROFILE, ADULT - NSICDXPASTMEDICALHX_GEN_ALL_CORE_FT
PAST MEDICAL HISTORY:  Anxiety     Cancer of skin     Dementia     DM (diabetes mellitus)     Hyperlipidemia     Hypertension     Malignant melanoma of left lower limb, including hip     Mild CAD carotids

## 2022-01-06 ENCOUNTER — RESULT REVIEW (OUTPATIENT)
Age: 87
End: 2022-01-06

## 2022-01-06 ENCOUNTER — OUTPATIENT (OUTPATIENT)
Dept: OUTPATIENT SERVICES | Facility: HOSPITAL | Age: 87
LOS: 1 days | End: 2022-01-06
Payer: MEDICARE

## 2022-01-06 VITALS
RESPIRATION RATE: 14 BRPM | WEIGHT: 130.07 LBS | TEMPERATURE: 98 F | DIASTOLIC BLOOD PRESSURE: 60 MMHG | SYSTOLIC BLOOD PRESSURE: 160 MMHG | HEART RATE: 64 BPM | OXYGEN SATURATION: 100 % | HEIGHT: 70 IN

## 2022-01-06 VITALS
OXYGEN SATURATION: 99 % | DIASTOLIC BLOOD PRESSURE: 54 MMHG | RESPIRATION RATE: 18 BRPM | HEART RATE: 55 BPM | SYSTOLIC BLOOD PRESSURE: 138 MMHG

## 2022-01-06 DIAGNOSIS — Z98.890 OTHER SPECIFIED POSTPROCEDURAL STATES: Chronic | ICD-10-CM

## 2022-01-06 DIAGNOSIS — C43.72 MALIGNANT MELANOMA OF LEFT LOWER LIMB, INCLUDING HIP: ICD-10-CM

## 2022-01-06 PROCEDURE — 82962 GLUCOSE BLOOD TEST: CPT

## 2022-01-06 PROCEDURE — 88342 IMHCHEM/IMCYTCHM 1ST ANTB: CPT | Mod: 26

## 2022-01-06 PROCEDURE — 15738 MUSCLE-SKIN GRAFT LEG: CPT | Mod: LT

## 2022-01-06 PROCEDURE — 88342 IMHCHEM/IMCYTCHM 1ST ANTB: CPT

## 2022-01-06 PROCEDURE — 11603 EXC TR-EXT MAL+MARG 2.1-3 CM: CPT

## 2022-01-06 PROCEDURE — 88305 TISSUE EXAM BY PATHOLOGIST: CPT | Mod: 26

## 2022-01-06 PROCEDURE — 88305 TISSUE EXAM BY PATHOLOGIST: CPT

## 2022-01-06 RX ORDER — CEFAZOLIN SODIUM 1 G
1000 VIAL (EA) INJECTION ONCE
Refills: 0 | Status: DISCONTINUED | OUTPATIENT
Start: 2022-01-06 | End: 2022-01-06

## 2022-01-06 RX ORDER — SODIUM CHLORIDE 9 MG/ML
1000 INJECTION, SOLUTION INTRAVENOUS
Refills: 0 | Status: DISCONTINUED | OUTPATIENT
Start: 2022-01-06 | End: 2022-01-06

## 2022-01-06 NOTE — ASU DISCHARGE PLAN (ADULT/PEDIATRIC) - CARE PROVIDER_API CALL
JOCELYN Gardiner  PLASTIC SURGERY  [address] [place] [address]  Phone: [phone]  Fax: [phone]  Follow Up Time:

## 2022-01-06 NOTE — ASU DISCHARGE PLAN (ADULT/PEDIATRIC) - ASU DC SPECIAL INSTRUCTIONSFT
Shower as usual starting tomorrow  Do not scrub or soak incision site. No tub baths. No hot tubs. No swimming pools  Weight bear/walk as tolerated on Left leg  Call Dr. Gardiner's office for an appointment for follow up in 1 week  Take antibiotics as prescribed  Take pain medications as prescribed

## 2022-01-06 NOTE — ASU DISCHARGE PLAN (ADULT/PEDIATRIC) - NS MD DC FALL RISK RISK
For information on Fall & Injury Prevention, visit: [email or web address] OR  [email or web address] OR  https://www.cdc.gov/steadi/patient.html

## 2022-01-06 NOTE — ASU PREOP CHECKLIST - SURGICAL CONSENT
done Odomzo Counseling- I discussed with the patient the risks of Odomzo including but not limited to nausea, vomiting, diarrhea, constipation, weight loss, changes in the sense of taste, decreased appetite, muscle spasms, and hair loss.  The patient verbalized understanding of the proper use and possible adverse effects of Odomzo.  All of the patient's questions and concerns were addressed.

## 2022-01-06 NOTE — BRIEF OPERATIVE NOTE - NSICDXBRIEFPROCEDURE_GEN_ALL_CORE_FT
PROCEDURES:  Wide excision, melanoma, lower extremity, with sentinel lymph node biopsy 06-Jan-2022 08:58:10  THU Gardiner

## 2022-01-08 NOTE — ASU PREOP CHECKLIST - BSA (M2)
ONCOLOGY/HEMATOLOGY FOLLOW-UP NOTE:  DIAGNOSIS:    1. Recurrent metastatic lobular carcinoma of the breast diagnosed 08/07/2020.  The cancer progressed through first-line endocrine therapy with fulvestrant and palbociclib and through second-line pembrolizumab (indication TMB-25).  Patient seen 10/18/2021 to discuss start 3rd line paclitaxel.  2. Stage I A postmenopausal hormone receptor positive HER2 non amplified grade 2 invasive lobular carcinoma of the right breast status post R0 right mastectomy (03/31/2005)--> adjuvant Adriamycin/Cytoxan followed by paclitaxel chemotherapy--> postmastectomy radiation therapy--> letrozole 2.5 mg daily x5 years.  3. Genetic testing:  BRCA 1/2 (2005):  No variants identified.  [place] myRisk (09/24/2019):  No variants identified.  4. formerly [facility] CDX performed on specimen from 08/07/2019:  Immutnotherapy markers:   MSI by NGS: Stable  MMR by IHC: Not performed  PDL-1: Not performed  TMB: 25 mutations/mb      Genes of interest:   PIK3CA: Mutated, E545K, E542K   ESR1: Wildtype     Additional alterations:   JOAN *  BAP1 Q36*  CDH1 spice site mutation  DNMT3 rearrangement  NOTCH1 G3815P  PTEN Q245*  SPEN *  TBX3 frameshift     CHIEF COMPLAINT:    Lilia Dixon is a 68 year old woman with a history of recurrent metastatic invasive lobular carcinoma of the right breast.    HISTORY OF PRESENT ILLNESS:  Ms. Lilia Dixon was seen at the [facility]. She is a 68 year old female patient who transferred to this clinic to establish local medical oncology care.    Lilia Dixon is a 68 year old woman who presents to transfer medical oncology care.  Her oncologic history is summarized in outline form below.  Following curative intent treatment of early stage postmenopausal hormone receptor positive infiltrating lobular carcinoma of the right breast the patient was doing well on observation from 2011 until approximately 2016. Over the next 3 year she had  increasing problems with diarrhea abdominal pain and bloating and fatigue.  Over time this was felt to represent Crohn's disease.  She had a colonoscopy on 08/06/2019 which showed a stricture in the colon at 60 cm.  After the procedure the patient developed severe pain and was hospitalized.  The next day she was brought to the operating room for an exploratory laparotomy.  At that point was still felt that she had Crohn's disease and that she had significant bowel at risk with multiple levels of obstruction.  She had resection of the terminal ileum and extended right hemicolectomy with ileal sigmoid anastomosis.  Pathology showed no evidence of Crohn's disease.  She had extensive involvement of the bowel with recurrent metastatic lobular carcinoma of the breast (grade 2 ER positive, FL negative, HER2 negative, Ki-67 28%).    Postoperative imaging and imaging since that time has not really shown convincing evidence of significant residual metastatic disease.  There was no evidence of new malignancy in the left breast.  Updated germ line genetic testing showed no variants.  The patient is eventually had a [facility] test on the recurrent metastatic disease.  The patient continues on essentially first-line therapy with fulvestrant plus palbociclib.  Initially she received the aromatase inhibitor letrozole but because of significant diarrhea and concerned that letrozole was not being absorbed she was switched to fulvestrant.  Palbociclib has gone through 2 dose reductions because of myelosuppression and diarrhea.  At the time of transfer of care she was receiving fulvestrant 500 mg IM q.4 weeks with palbociclib 75 mg daily 3 weeks on 1 week off q.4 weeks.  She also receives supportive B12 1000 mcg IM monthly.    Further evaluation demonstrated progressive invasive lobular carcinoma.  The patient was switched to pembrolizumab single agent along with continued aggressive supportive care.  With aggressive  supportive care the patient's overall performance status improved.  I do not know if the patient ever had a response to pembrolizumab but by August 2021 the patient developed active  complaints and had abnormal imaging.  Cystoscopy with biopsy of the bladder wall showed metastatic lobular carcinoma of the breast.  This was evidence of disease progression.    After much discussion we elected to proceed with chemotherapy.  Our 1st choice was Nab paclitaxel (Abraxane).  However a national shortage forced switching to paclitaxel (Taxol).  The patient started chemotherapy on 10/18/2021.  We were able to switch back to Abraxane on 11/22/2021.    INTERVAL HISTORY:  Lilia Dixon returns for day 15 chemotherapy.  The patient this week is not feeling as well as she missed few days of her   multiple supplements that have seemed to stabilize her blood tests and how she feels. Her daughter was unable to get them from the pharmacy.  She has overall tolerated Abraxane chemotherapy much better then paclitaxel.   She has usually requires IV fluids 3 times a week in our office.  Will go back to that this week due to not feeling as well as last week only had IVF twice.  The patient is agreeable to the plan.  A full review of systems is reviewed with the patient as detailed below.    Interval past history, family history, social history, medications and allergies are reviewed with the patient and are unchanged compared to database/Epic except as noted above.    REVIEW OF SYSTEMS  GENERAL:  Patient denies headache, fevers, chills, night sweats, excessive fatigue, change in appetite, weight loss, dizziness, but complains of: mild to mod fatigue  ALLERGIC/IMMUNOLOGIC: Verified allergies: Yes  EYES:  Patient denies significant visual difficulties, double vision, blurred vision  ENT/MOUTH: Patient denies problems with hearing, sore throat, sinus drainage, mouth sores  ENDOCRINE:  Patient denies diabetes, thyroid disease, hormone  replacement, hot flashes  HEMATOLOGIC/LYMPHATIC: Patient denies easy bruising, bleeding, tender lymph nodes, swollen lymph nodes  BREASTS: Patient denies abnormal masses of breast, nipple discharge, pain  RESPIRATORY:  Patient denies lung pain with breathing, cough, coughing up blood, but complains of: shortness of breath  CARDIOVASCULAR:  Patient denies anginal chest pain, palpitations, shortness of breath when lying flat, peripheral edema  GASTROINTESTINAL: Patient denies abdominal pain , nausea, vomiting, diarrhea, GI bleeding, constipation, change in bowel habits, heartburn, sensation of feeling full, difficulty swallowing  : Patient denies abnormal genital masses, blood in the urine, frequency, urgency, burning with urination, hesitancy, incontinence, vaginal bleeding, discharge  MUSCULOSKELETAL:  Patient denies joint pain, bone pain, joint swelling, redness, decreased range of motion, but complains of: muscle weakness  SKIN:  Patient denies chronic rashes, inflammation, ulcerations, skin changes, itching  NEUROLOGIC:  Patient denies loss of balance, areas of focal weakness, abnormal gait, sensory problems, numbness, tingling  PSYCHIATRIC: Patient denies insomnia, depression, anxiety        PHYSICAL EXAMINATION:    The patient is a 68 year old female who is alert, oriented times 3, in no apparent distress.  VITALS:    Visit Vitals  /60 (BP Location: RUE - Right upper extremity, Patient Position: Standing, Cuff Size: Regular)   Pulse 95   Temp 97.5 °F (36.4 °C) (Oral)   Resp 20   Ht 5' 4.21\" (1.631 m)   Wt 56.1 kg (123 lb 12 oz)   LMP 12/11/2005   SpO2 99%   BMI 21.10 kg/m²      ECOG:    ECOG [01/03/22 1309]   ECOG Performance Status 2     CONSTITUTIONAL:  Alert, cooperative, oriented.  Mood and affect appropriate.  Appears close to chronological age.  Well nourished.  Well developed.   PSYCHIATRIC:  Alert and oriented x 3.  Coherent speech.  Verbalizes understanding of our discussions today.      Labs  and Imaging:  Labs are reviewed and are filed in the flow sheet.    Recent Labs   Lab 01/03/22  1333 12/27/21  1018 12/20/21  1234   WBC 4.8 3.6* 3.3*   RBC 3.24* 3.24* 3.24*   HGB 9.0* 9.2* 9.1*   HCT 28.4* 28.9* 29.0*   MCV 87.7 89.2 89.5    272 274   Absolute Neutrophils 3.0 2.3 1.8   Absolute Lymphocytes 0.8* 0.8* 0.9*   Absolute Monocytes 0.8 0.3 0.4   Absolute Eosinophils  0.1 0.1 0.1   Absolute Basophils 0.1 0.0 0.0     Recent Labs   Lab 01/07/22  1058 01/03/22  1333 12/27/21  1018 12/20/21  1234 12/20/21  1234   Glucose 141* 179* 135*   < > 119*   Sodium 133* 133* 138   < > 137   Potassium 3.2* 3.5 3.7   < > 4.0   Chloride 105 104 110*   < > 110*   Creatinine 0.95 1.13* 0.87   < > 0.93   Calcium 8.3* 9.0 8.8   < > 8.1*   Magnesium 1.9 1.4* 1.6*   < > 1.6*   Protein, Total  --  7.4 6.5  --  6.2*   Albumin  --  2.7* 2.6*  --  2.4*   GOT/AST  --  20 13  --  12   Alkaline Phosphatase  --  183* 105  --  104   GPT  --  24 16  --  12   Bilirubin, Total  --  0.5 0.4  --  0.4    < > = values in this interval not displayed.     Recent Labs   Lab 11/26/21  0632   LD, Total 97      CT chest abdomen pelvis (11/16/2021):  IMPRESSION:       1. No progression of disease. However, metastatic disease is present in the  abdomen/pelvis. Findings include subtle irregular multifocal nodular  peritoneal thickening with small-volume ascites consistent with peritoneal  carcinomatosis. Dominant sites of disease are present in the anterior  superior and ventral peritoneum as well as the paracolic gutters of the  cephalad abdomen. Asymmetric wall thickening of the gastric antrum is  present compatible with neoplastic infiltration (linitis plastica  phenomenon although localized to the gastric antrum only). Transmural  spread of tumor into the perigastric fat is also present at this site.  Other scattered small nodular foci identified along the surface of small  bowel. Much of the posterior bladder wall is involved with tumor  exhibiting  transmural enhancement compatible with biopsy-proven metastatic breast  cancer.  2. Bilateral ureteral stent placement. The previously documented  right-sided hydronephrosis has resolved. The left-sided hydronephrosis has  improved but is persistent raising question for ureteral stent dysfunction.  Urothelial thickening is evident on the left indicative of nonspecific  inflammation. This latter finding is unchanged.  3. Mild circumferential wall thickening of the rectosigmoid colon  compatible with nonspecific colitis/proctitis.  4. 3 hepatic lesions are present consistent with benign hemangiomata as  detailed above.  5. Multiple small pulmonary nodules, indeterminate but unchanged since  recent exam. No other metastatic disease to the chest is present.  6. Esophageal distention with fluid and gas. Differential diagnosis  includes most likely esophageal dysmotility.      PATHOLOGICAL DATA:  1. Right breast mass core needle biopsy (03/21/2005):  AS [id number]:  Positive for malignancy.  Grade 2 infiltrating lobular carcinoma.  LVI negative.  ER positive (95%); MN positive (95%); HER2 negative (IHC 1+).    2. Right mastectomy with right axillary sentinel lymph node biopsy (4/1/2005):  There is a grade 2 multi focal infiltrating lobular carcinoma of the right breast (4.4 cm, 1.2 cm).  All margins are negative.  There is no evidence of lymphovascular invasion.  Three sentinel lymph nodes were negative for metastatic disease.  3. Colonoscopy (08/06/2019):  SAH-:  Small-bowel biopsies-negative; biopsy of antrum showed mild congestion; biopsy of colon stricture at 60 cm positive for metastatic breast cancer.  Biopsies from right and left colon negative.  4. Resection of terminal ileum, cecum, right colon, transverse colon and proximal descending colon an appendectomy (08/07/2019):  SAH-:  There is a multifocal metastatic pleomorphic infiltrating lobular carcinoma involving the colon.  There is no  metastatic disease to multiple regional lymph nodes.  ER positive (90%); MI negative; HER2 negative (IHC 2+; fish-1.09).  Ki-67 28%.    5. Sigmoidoscopy with:  Biopsy (12/23/2020, revised 01/22/2021):  WK :  Positive for malignancy.  Metastatic lobular carcinoma.  6. Cystoscopy with bladder biopsy (08/19/2021):  WK :  Positive for malignancy.  Metastatic carcinoma consistent with metastatic lobular carcinoma of the breast based on morphology and immunohistochemical staining pattern.  ER positive (80%-strong); MI positive (1-2%-strong); HER2 nonamplified (IHC 2+, FISH-nonamplified).      Cancer Staging  Infiltrating lobular carcinoma (CMS/HCC)  Staging form: Breast, AJCC 8th Edition  - Clinical stage from 2/1/2005: Stage IB (cT2, cN0, cM0, G2, ER+, MI+, HER2-) - Signed by Crispin Bryant MD on 12/10/2020  - Pathologic stage from 3/21/2005: Stage IA (pT2, pN0(sn), cM0, G2, ER+, MI+, HER2-) - Signed by Crispin Bryant MD on 12/10/2020  - Pathologic stage from 8/7/2019: Stage IV (rpT2, pN0, pM1, GX, ER+, MI-, HER2-) - Signed by Crispin Bryant MD on 12/10/2020      ASSESSMENT:  Ms. Lilia Dixon is a 68 year old female patient with a history of postmenopausal hormone receptor positive HER2 non amplified grade 2 invasive lobular carcinoma of the right breast status post R0 right mastectomy (03/31/2005) followed by adjuvant Adriamycin Cytoxan--> paclitaxel chemotherapy, postmastectomy radiation therapy and 5 years of adjuvant letrozole (completed 2011).  The patient was diagnosed with recurrent metastatic grade 2 hormone receptor positive HER2 non amplified metastatic lobular carcinoma with metastases to colon in August 2019. There is no unequivocal evidence of disease evident by imaging (CT scan or PET-CT) since surgery.  The patient has been on first-line endocrine therapy which is currently fulvestrant plus palbociclib.  She was initially on letrozole but because of high-volume diarrhea there was  concern regarding absorption and she was switched to fulvestrant.  She is currently tolerating first-line therapy acceptably well except to what ever degree palbociclib may be contributing to her diarrhea.  It is noted that on several occasions when she has had palbociclib drug holidays or dose reductions this is not affected her diarrhea.  Palbociclib is at dose level -2 (75 mg) daily days 1-21 Q 4 weeks.  The patient transferred her care to this office on 12/11/2020 and we continued with this plan of care until 02/03/2020.    The patient recently underwent PET-CT, diagnostic CT scan of the abdomen pelvis and flexible sigmoidoscopy.  There was some concern for disease progression on imaging but overall the results were somewhat equivocal.  A sigmoidoscopy showed a stricture in the was concerned that this was a malignant stricture.  The patient was evaluated by Dr. Chow from Colorectal surgery.  She did not feel that there was a mechanical stricture.  Pathology was reviewed with extra cuts and it was apparent that the bowel was involved with viable malignancy.  I discussed this with the patient by phone and again today.  I felt this was evidence of disease progression and first-line endocrine therapy was discontinued.  Patient's case was presented to the molecular tumor Board.  There is a PIK3CA variant identified but there is a coexistent PTEN variant which often results in primary resistance to alpelisib + endocrine therapy.  TMB was 25 which is quite high particularly for breast cancer.  Pembrolizumab has a tumor agnostic approval with a TMB greater than 10.  This is based on the keynote 158 trial.  In that trial there were only 5 patients with breast cancer and they were not separately reported.  However after much discussion we have elected to proceed with pembrolizumab.  The patient started on 02/03/2021.     The patient was seen 07/28/2021.  At that time we continued with pembrolizumab.  However, preclinic  restaging studies showed significant thickening of the bladder wall and moderate bilateral hydronephrosis.  Urology was consulted and the patient had metastatic bladder carcinoma infiltrating bladder wall accounting for the hydronephrosis.  Stenting resulted in improvement of the hydronephrosis.  Recently there was definitive evidence of disease progressing through pembrolizumab.    We discussed options of going back on fulvestrant (or an AI if we can get it approved) in combination with alpelisib (based on PIK3CA mutation seen on [facility] testing).  We definitely have concerns regarding the ability to take p.o. medications well and some of the side effects of alpelisib.  In addition she has significant visceral infiltrative disease.  After much discussion I recommend cytotoxic chemotherapy with Abraxane.  However because of a national shortage of Abraxane were forced to switch to paclitaxel.  The patient started chemotherapy in 10/18/2021.    The patient was seen 11/22/2021 with preclinic restaging CT scan imaging.  There was no evidence of disease progression on short-term imaging but again we discussed the fact that her disease is under appreciated by imaging.  She has diffusely infiltrative disease known to be involving bowel and bladder.  We interpreted her symptoms as being related to disease rather than chemotherapy.  We will proceed with Abraxane.  Prognosis is overall quite guarded.  She has not responded well to any systemic regimen.  There are certainly other chemotherapy medications that can be tried including sequential single agents such as anthracycline is, gemcitabine, capecitabine and others.  The only endocrine treatment that could be tried would be fulvestrant plus alpelisib.  I am not sure how well she would tolerate alpelisib and she has already received fulvestrant.    The patient was seen in follow-up by Dr Bryant post recent hospitalization. The patient had not progressed on  taxane based chemotherapy but she certainly did not have a significant improvement in her cancer related symptoms or overall performance status.  Typically we would continue Abraxane for at least another 2 cycles and then do restaging.  At this point she is in a deeply palliative sequential single agent chemotherapy mode of care.  We reviewed alternatives including best supportive care/hospice.  However this point she remains committed to trying treatment.  We will continue with Abraxane.      Since recent hospitalization and changing of supplements, the patient had a much improved symptom complex.  She is eating better and feeling better overall. Unfortunately, she missed a few days of supplements and not feeling as well today. Her blood counts however have not changed much. Plan to proceed with day 15 chemo as planned today.   We will continue supportive IV fluids today and 3 times a week this week.   She will follow-up next week as scheduled for IVF and in 2 weeks to start next cycle of  chemotherapy.    IMPRESSION/RECOMMENDATIONS:    1. Proceed with day 15 Abraxane weekly chemotherapy as scheduled today.    2. Will give 1 L of normal saline today   3. She will follow up onWednesday and Friday for 1 L of normal saline.    4. She will follow-up next week for  possible fluids as scheduled.  5. Patient restarted her supportive meds and will continue with all her supplements at home.  6. She will follow-up with Dr. Bryant in 2 weeks as scheduled with repeat labs for next cycle of weekly Abraxane chemotherapy.      Tri BEJARANO  Nurse Practitioner   1.74

## 2022-03-11 ENCOUNTER — APPOINTMENT (OUTPATIENT)
Dept: OTOLARYNGOLOGY | Facility: CLINIC | Age: 87
End: 2022-03-11
Payer: MEDICARE

## 2022-03-11 VITALS
BODY MASS INDEX: 18.61 KG/M2 | SYSTOLIC BLOOD PRESSURE: 134 MMHG | WEIGHT: 130 LBS | DIASTOLIC BLOOD PRESSURE: 72 MMHG | HEIGHT: 70 IN | HEART RATE: 69 BPM

## 2022-03-11 DIAGNOSIS — R04.0 EPISTAXIS: ICD-10-CM

## 2022-03-11 DIAGNOSIS — J34.2 DEVIATED NASAL SEPTUM: ICD-10-CM

## 2022-03-11 DIAGNOSIS — J31.0 CHRONIC RHINITIS: ICD-10-CM

## 2022-03-11 DIAGNOSIS — Z78.9 OTHER SPECIFIED HEALTH STATUS: ICD-10-CM

## 2022-03-11 DIAGNOSIS — Z86.39 PERSONAL HISTORY OF OTHER ENDOCRINE, NUTRITIONAL AND METABOLIC DISEASE: ICD-10-CM

## 2022-03-11 DIAGNOSIS — J34.89 OTHER SPECIFIED DISORDERS OF NOSE AND NASAL SINUSES: ICD-10-CM

## 2022-03-11 DIAGNOSIS — H61.23 IMPACTED CERUMEN, BILATERAL: ICD-10-CM

## 2022-03-11 DIAGNOSIS — Z86.79 PERSONAL HISTORY OF OTHER DISEASES OF THE CIRCULATORY SYSTEM: ICD-10-CM

## 2022-03-11 PROCEDURE — 69210 REMOVE IMPACTED EAR WAX UNI: CPT

## 2022-03-11 PROCEDURE — 99213 OFFICE O/P EST LOW 20 MIN: CPT | Mod: 25

## 2022-03-11 PROCEDURE — 30901 CONTROL OF NOSEBLEED: CPT

## 2022-03-11 RX ORDER — ROSUVASTATIN CALCIUM 5 MG/1
5 TABLET, FILM COATED ORAL
Refills: 0 | Status: DISCONTINUED | COMMUNITY
End: 2022-03-11

## 2022-03-11 RX ORDER — ALPRAZOLAM 0.25 MG/1
0.25 TABLET ORAL
Refills: 0 | Status: ACTIVE | COMMUNITY

## 2022-03-11 RX ORDER — ASPIRIN 81 MG
81 TABLET, DELAYED RELEASE (ENTERIC COATED) ORAL
Refills: 0 | Status: DISCONTINUED | COMMUNITY
End: 2022-03-11

## 2022-03-11 RX ORDER — FLUVASTATIN SODIUM 80 MG/1
80 TABLET, EXTENDED RELEASE ORAL
Refills: 0 | Status: ACTIVE | COMMUNITY

## 2022-03-11 RX ORDER — MUPIROCIN 20 MG/G
2 OINTMENT TOPICAL
Qty: 1 | Refills: 5 | Status: ACTIVE | COMMUNITY
Start: 2022-03-11 | End: 1900-01-01

## 2022-03-11 NOTE — REVIEW OF SYSTEMS
[Seasonal Allergies] : seasonal allergies [Nose Bleeds] : nose bleeds [Negative] : Heme/Lymph [de-identified] : feel cooler than others [FreeTextEntry1] : Daytime sleepiness

## 2022-03-11 NOTE — ASSESSMENT
[FreeTextEntry1] : Reviewed and reconciled medications, allergies, PMHx, PSHx, SocHx, FMHx. \par \par \par impacted cerumen removed bilaterally  right nares septum cauterized with silver nitrate and bacitracin placed in nose.\par \par Plan;  wipe the drip. no nose blowing  baCTROBAN OINTMENT AT NIGHT DIRECT FROM TUBE.  use saline gel spray 3x per day. all right nares\par  fu 1 month\par

## 2022-03-11 NOTE — HISTORY OF PRESENT ILLNESS
[de-identified] : The patient presents with h/o chronic epistaxis. The patient presents today for epistaxis. Pt reports of having chronic epistaxis. Pt states having excessive bleeding few months ago for which he went to the hospital for and was told to stop using aspirin. Pt reports of using saline spray. Pt denies using qtips.

## 2022-03-11 NOTE — PROCEDURE
[FreeTextEntry1] : control of epistaxis [FreeTextEntry2] : epistaxis recurrent chronic right side [FreeTextEntry3] : right side of nose packed with cotton saturated with 4% xylocaine and oxymetazoline.  Left in position for 10 minutes.  removed packing  packing bloody  bleeding sites identified right side and cauterized with silver nitrate and nose packed with bacitracin ointment.

## 2022-03-11 NOTE — ADDENDUM
[FreeTextEntry1] : Documented by Boyd Copeland acting as scribe for Dr. Mccormick on 03/11/2022. \par \par All Medical record entries made by the scribe were at my. Dr. Mccormick direction and personally dictated by me on 03/11/2022. I have reviewed the chart and agree that the record accurately reflects my personal performance of the history, physical exam, assessment and plan. I have also personally directed, reviewed, and agreed with the discharge instructions.

## 2022-03-11 NOTE — PHYSICAL EXAM
[Bleeding] : bleeding [Midline] : trachea located in midline position [Removed] : palatine tonsils previously removed [Normal] : no abnormal secretions [] : septum deviated to the left [Hearing Loss Right Only] : normal [Hearing Loss Left Only] : normal [FreeTextEntry8] : cerumen impaction removed via curettage  [FreeTextEntry9] : cerumen impaction removed via curettage  [FreeTextEntry1] : left side open and clear\par right side blood present - raw \par diffuse rawness of the septum on right [FreeTextEntry2] : sinuses nontender to percussion

## 2022-03-11 NOTE — CONSULT LETTER
[Dear  ___] : Dear  [unfilled], [Courtesy Letter:] : I had the pleasure of seeing your patient, [unfilled], in my office today. [Please see my note below.] : Please see my note below. [Consult Closing:] : Thank you very much for allowing me to participate in the care of this patient.  If you have any questions, please do not hesitate to contact me. [Sincerely,] : Sincerely, [FreeTextEntry3] : Giuseppe Mccormick MD FACS

## 2022-04-11 ENCOUNTER — APPOINTMENT (OUTPATIENT)
Dept: OTOLARYNGOLOGY | Facility: CLINIC | Age: 87
End: 2022-04-11

## 2022-04-20 ENCOUNTER — APPOINTMENT (OUTPATIENT)
Dept: CARDIOLOGY | Facility: CLINIC | Age: 87
End: 2022-04-20

## 2022-07-25 NOTE — ED ADULT NURSE NOTE - NSFALLRSKOUTCOME_ED_ALL_ED
[address]  Cardiology Consult Note      Marianne Kevin  1990, 28 y.o.          CC:  \"I have a heart murmur\"             Rayo Laird MD:      HPI:   This is a 28 y.o. male with a heart murmur, referral from Dr. Citlalli Baig to establish cardiologist.   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 He has not had a follow-up. On interrogation the patient himself denies any abnormal symptoms.   He can play basketball with friends with no physical limitations      Past Medical History:   Diagnosis Date    Asthma       Past Surgical History:   Procedure Laterality Date    FINGER CLOSED REDUCTION  9/22/11    Right ring metacarpal fx pinning      Family History   Problem Relation Age of Onset    Arthritis Other     Asthma Other     Diabetes Other     High Blood Pressure Other     Kidney Disease Other       Social History     Tobacco Use    Smoking status: Never    Smokeless tobacco: Never   Vaping Use    Vaping Use: Never used   Substance Use Topics    Alcohol use: Yes    Drug use: No     Allergies   Allergen Reactions    Penicillins          Review of Systems -   Constitutional: Negative for weight gain/loss; malaise, fever  Respiratory: Negative for Asthma;  cough and hemoptysis  Cardiovascular: Negative for palpitations,dizziness   Gastrointestinal: Negative for abd.pain; constipation/diarrhea;    Genitourinary: Negative for stones; hematuria; frequency hesitancy  Integumentt: Negative for rash or pruritis  Hematologic/lymphatic: Negative for blood dyscrasia; leukemia/lymphoma  Musculoskeletal: Negative for Connective tissue disease  Neurological:  Negative for Seizure   Behavioral/Psych:Negative for Bipolar disorder, Schizophrenia; Dementia  Endocrine: negative for thyroid, parathyroid disease    Physical Examination:    /78 (Site: Right Upper Arm, Position: Sitting)   Pulse 63   Ht 5' 7\" (1.702 m)   Wt 128 lb (58.1 kg)   SpO2 99%   BMI 20.05 kg/m²    HEENT:  Face: Atraumatic, Conjunctiva: Pink; non icteric,  Mucous Memb:  Moist, No thyromegaly or Lymphadenopathy  Respiratory:  Resp Assessment: normal, Resp Auscultation: clear   Cardiovascular: Auscultation: nl S1 & S2, Palpation:  Nl PMI; No heaves or thrills, JVP:  normal  Abdomen: Soft, non-tender, Normal bowel sounds,  No organomegaly  Extremities: No Cyanosis or Clubbing  Neurological: Oriented to time, place, and person, Non-anxious  Psychiatric: Normal mood and affect  Skin: Warm and dry,  No rash seen     Outpatient Medications Marked as Taking for the 22 encounter (Office Visit) with Long Mandujano MD   Medication Sig Dispense Refill    albuterol sulfate  (90 Base) MCG/ACT inhaler Use 2 puffs 4 times daily as needed for wheezing. Dispense with Spacer and instruct in use. May dispense generic. 1 Inhaler 1       EK22 Normal Sinus rhythm        ASSESSMENT AND PLAN:        Jessica Jensen who was referred to us for a questionable murmur  There was a history of him being born with a hole in the heart that was supposed to close  He has never had a follow-up  He has no active symptoms and no physical limitations with activity    Personally did not hear a murmur. If it is that it is very subtle. Plan is to obtain an echocardiogram to evaluate for structural/congenital heart disease    Thank you very much for allowing me to participate in the care of your patient. Please do not hesitate to contact me if you have any questions. Sincerely,    Betsy Lee M.D  [facility] [address]  Ph: [phone]  Fax: [phone]  This note was scribed in the presence of Betsy Lee MD by Carlos Borges RN    Physician Attestation:  The scribes documentation has been prepared under my direction and personally reviewed by me in its entirety.      I confirm that the note above accurately reflects all work, treatment, procedures, and medical decision making performed by me. Universal Safety Interventions

## 2022-10-19 ENCOUNTER — APPOINTMENT (OUTPATIENT)
Dept: ORTHOPEDIC SURGERY | Facility: CLINIC | Age: 87
End: 2022-10-19
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
## 2023-04-03 NOTE — ED ADULT NURSE NOTE - PMH
[de-identified] : 30 y/o female patient presents today:\par - f/u weight management/obesity - patient currently on Mounjaro 2.5mg weekly and Topamax 100mg daily, denies any overt side effects, has been decreasing portions, decreased appetite, increased physical activity, feels very motivated and excited with progress \par - Weight loss since last visit: 6 lbs Total weight loss: 51 lbs \par 
Anxiety    Hyperlipidemia    Hypertension

## 2023-07-12 ENCOUNTER — APPOINTMENT (OUTPATIENT)
Dept: OTOLARYNGOLOGY | Facility: CLINIC | Age: 88
End: 2023-07-12

## 2023-07-20 NOTE — CONSULT NOTE ADULT - CONSULT REQUESTED BY NAME
[address] [facility]  Dept: [phone]  Dept Fax: [phone]: [phone]    Visit Date: 7/20/2023    Functionality Assessment/Goals Worksheet     On a scale of 0 (Does not Interfere) to 10 (Completely Interferes)     1. Which number describes how during the past week pain has interfered with       the following:  A. General Activity:  4  B. Mood: 2  C. Walking Ability:  3  D. Normal Work (Includes both work outside the home and housework):  1  E. Relations with Other People:   0  F. Sleep:   4  G. Enjoyment of Life:   2    2. Patient Prefers to Take their Pain Medications:     []  On a regular basis   []  Only when necessary    []  Does not take pain medications    3. What are the Patient's Goals/Expectations for Visiting Pain Management?      []  Learn about my pain    []  Receive Medication   []  Physical Therapy     []  Treat Depression   []  Receive Injections    []  Treat Sleep   []  Deal with Anxiety and Stress   []  Treat Opoid Dependence/Addiction   []  Other: Dr. Diallo

## 2023-07-21 ENCOUNTER — APPOINTMENT (OUTPATIENT)
Dept: OTOLARYNGOLOGY | Facility: CLINIC | Age: 88
End: 2023-07-21

## 2023-08-08 NOTE — ED ADULT TRIAGE NOTE - MODE OF ARRIVAL
Walk in Minocycline Counseling: Patient advised regarding possible photosensitivity and discoloration of the teeth, skin, lips, tongue and gums.  Patient instructed to avoid sunlight, if possible.  When exposed to sunlight, patients should wear protective clothing, sunglasses, and sunscreen.  The patient was instructed to call the office immediately if the following severe adverse effects occur:  hearing changes, easy bruising/bleeding, severe headache, or vision changes.  The patient verbalized understanding of the proper use and possible adverse effects of minocycline.  All of the patient's questions and concerns were addressed.

## 2023-11-02 ENCOUNTER — APPOINTMENT (OUTPATIENT)
Dept: ORTHOPEDIC SURGERY | Facility: CLINIC | Age: 88
End: 2023-11-02
Payer: MEDICARE

## 2023-11-02 VITALS — HEIGHT: 70 IN | WEIGHT: 130 LBS | BODY MASS INDEX: 18.61 KG/M2

## 2023-11-02 PROCEDURE — 72070 X-RAY EXAM THORAC SPINE 2VWS: CPT

## 2023-11-02 PROCEDURE — 72110 X-RAY EXAM L-2 SPINE 4/>VWS: CPT

## 2023-11-02 PROCEDURE — 99204 OFFICE O/P NEW MOD 45 MIN: CPT

## 2023-11-02 RX ORDER — BACLOFEN 10 MG/1
10 TABLET ORAL 3 TIMES DAILY
Qty: 30 | Refills: 0 | Status: ACTIVE | COMMUNITY
Start: 2023-11-02 | End: 1900-01-01

## 2023-11-10 ENCOUNTER — RESULT REVIEW (OUTPATIENT)
Age: 88
End: 2023-11-10

## 2023-11-20 ENCOUNTER — APPOINTMENT (OUTPATIENT)
Dept: ORTHOPEDIC SURGERY | Facility: CLINIC | Age: 88
End: 2023-11-20
Payer: MEDICARE

## 2023-11-20 DIAGNOSIS — M48.50XA COLLAPSED VERTEBRA, NOT ELSEWHERE CLASSIFIED, SITE UNSPECIFIED, INITIAL ENCOUNTER FOR FRACTURE: ICD-10-CM

## 2023-11-20 PROCEDURE — 72100 X-RAY EXAM L-S SPINE 2/3 VWS: CPT

## 2023-11-20 PROCEDURE — 99213 OFFICE O/P EST LOW 20 MIN: CPT

## 2023-12-21 ENCOUNTER — APPOINTMENT (OUTPATIENT)
Dept: ORTHOPEDIC SURGERY | Facility: CLINIC | Age: 88
End: 2023-12-21

## 2024-02-22 ENCOUNTER — INPATIENT (INPATIENT)
Facility: HOSPITAL | Age: 89
LOS: 3 days | Discharge: NURSING, REHAB OR LONG-TERM CARE | DRG: 563 | End: 2024-02-26
Attending: INTERNAL MEDICINE | Admitting: INTERNAL MEDICINE
Payer: MEDICARE

## 2024-02-22 VITALS
SYSTOLIC BLOOD PRESSURE: 114 MMHG | TEMPERATURE: 98 F | OXYGEN SATURATION: 98 % | DIASTOLIC BLOOD PRESSURE: 66 MMHG | HEART RATE: 80 BPM | RESPIRATION RATE: 16 BRPM

## 2024-02-22 DIAGNOSIS — S43.005A UNSPECIFIED DISLOCATION OF LEFT SHOULDER JOINT, INITIAL ENCOUNTER: ICD-10-CM

## 2024-02-22 DIAGNOSIS — I10 ESSENTIAL (PRIMARY) HYPERTENSION: ICD-10-CM
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
## 2024-02-22 DIAGNOSIS — Z98.890 OTHER SPECIFIED POSTPROCEDURAL STATES: Chronic | ICD-10-CM

## 2024-02-22 DIAGNOSIS — R29.6 REPEATED FALLS: ICD-10-CM

## 2024-02-22 DIAGNOSIS — E78.5 HYPERLIPIDEMIA, UNSPECIFIED: ICD-10-CM

## 2024-02-22 LAB
ALBUMIN SERPL ELPH-MCNC: 3.4 G/DL — SIGNIFICANT CHANGE UP (ref 3.3–5)
ALP SERPL-CCNC: 112 U/L — SIGNIFICANT CHANGE UP (ref 40–120)
ALT FLD-CCNC: 25 U/L — SIGNIFICANT CHANGE UP (ref 12–78)
ANION GAP SERPL CALC-SCNC: 7 MMOL/L — SIGNIFICANT CHANGE UP (ref 5–17)
APPEARANCE UR: CLEAR — SIGNIFICANT CHANGE UP
AST SERPL-CCNC: 33 U/L — SIGNIFICANT CHANGE UP (ref 15–37)
BASOPHILS # BLD AUTO: 0 K/UL — SIGNIFICANT CHANGE UP (ref 0–0.2)
BASOPHILS NFR BLD AUTO: 0 % — SIGNIFICANT CHANGE UP (ref 0–2)
BILIRUB SERPL-MCNC: 1 MG/DL — SIGNIFICANT CHANGE UP (ref 0.2–1.2)
BILIRUB UR-MCNC: NEGATIVE — SIGNIFICANT CHANGE UP
BUN SERPL-MCNC: 24 MG/DL — HIGH (ref 7–23)
CALCIUM SERPL-MCNC: 8.6 MG/DL — SIGNIFICANT CHANGE UP (ref 8.5–10.1)
CHLORIDE SERPL-SCNC: 101 MMOL/L — SIGNIFICANT CHANGE UP (ref 96–108)
CO2 SERPL-SCNC: 30 MMOL/L — SIGNIFICANT CHANGE UP (ref 22–31)
COLOR SPEC: YELLOW — SIGNIFICANT CHANGE UP
CREAT SERPL-MCNC: 0.88 MG/DL — SIGNIFICANT CHANGE UP (ref 0.5–1.3)
DIFF PNL FLD: NEGATIVE — SIGNIFICANT CHANGE UP
EGFR: 81 ML/MIN/1.73M2 — SIGNIFICANT CHANGE UP
EOSINOPHIL # BLD AUTO: 0 K/UL — SIGNIFICANT CHANGE UP (ref 0–0.5)
EOSINOPHIL NFR BLD AUTO: 0 % — SIGNIFICANT CHANGE UP (ref 0–6)
GLUCOSE SERPL-MCNC: 214 MG/DL — HIGH (ref 70–99)
GLUCOSE UR QL: 500 MG/DL
HCT VFR BLD CALC: 33.7 % — LOW (ref 39–50)
HGB BLD-MCNC: 11.9 G/DL — LOW (ref 13–17)
KETONES UR-MCNC: NEGATIVE MG/DL — SIGNIFICANT CHANGE UP
LEUKOCYTE ESTERASE UR-ACNC: NEGATIVE — SIGNIFICANT CHANGE UP
LYMPHOCYTES # BLD AUTO: 0.45 K/UL — LOW (ref 1–3.3)
LYMPHOCYTES # BLD AUTO: 4 % — LOW (ref 13–44)
MCHC RBC-ENTMCNC: 35.3 GM/DL — SIGNIFICANT CHANGE UP (ref 32–36)
MCHC RBC-ENTMCNC: 37.9 PG — HIGH (ref 27–34)
MCV RBC AUTO: 107.3 FL — HIGH (ref 80–100)
MONOCYTES # BLD AUTO: 0.79 K/UL — SIGNIFICANT CHANGE UP (ref 0–0.9)
MONOCYTES NFR BLD AUTO: 7 % — SIGNIFICANT CHANGE UP (ref 2–14)
NEUTROPHILS # BLD AUTO: 10.02 K/UL — HIGH (ref 1.8–7.4)
NEUTROPHILS NFR BLD AUTO: 86 % — HIGH (ref 43–77)
NITRITE UR-MCNC: NEGATIVE — SIGNIFICANT CHANGE UP
NRBC # BLD: SIGNIFICANT CHANGE UP /100 WBCS (ref 0–0)
PH UR: 7 — SIGNIFICANT CHANGE UP (ref 5–8)
PLATELET # BLD AUTO: 365 K/UL — SIGNIFICANT CHANGE UP (ref 150–400)
POTASSIUM SERPL-MCNC: 4.5 MMOL/L — SIGNIFICANT CHANGE UP (ref 3.5–5.3)
POTASSIUM SERPL-SCNC: 4.5 MMOL/L — SIGNIFICANT CHANGE UP (ref 3.5–5.3)
PROT SERPL-MCNC: 6.2 G/DL — SIGNIFICANT CHANGE UP (ref 6–8.3)
PROT UR-MCNC: NEGATIVE MG/DL — SIGNIFICANT CHANGE UP
RAPID RVP RESULT: SIGNIFICANT CHANGE UP
RBC # BLD: 3.14 M/UL — LOW (ref 4.2–5.8)
RBC # FLD: 13 % — SIGNIFICANT CHANGE UP (ref 10.3–14.5)
SARS-COV-2 RNA SPEC QL NAA+PROBE: SIGNIFICANT CHANGE UP
SODIUM SERPL-SCNC: 138 MMOL/L — SIGNIFICANT CHANGE UP (ref 135–145)
SP GR SPEC: 1.02 — SIGNIFICANT CHANGE UP (ref 1–1.03)
UROBILINOGEN FLD QL: 0.2 MG/DL — SIGNIFICANT CHANGE UP (ref 0.2–1)
WBC # BLD: 11.26 K/UL — HIGH (ref 3.8–10.5)
WBC # FLD AUTO: 11.26 K/UL — HIGH (ref 3.8–10.5)

## 2024-02-22 PROCEDURE — 73060 X-RAY EXAM OF HUMERUS: CPT | Mod: 26,LT

## 2024-02-22 PROCEDURE — 73200 CT UPPER EXTREMITY W/O DYE: CPT | Mod: 26,LT

## 2024-02-22 PROCEDURE — 70450 CT HEAD/BRAIN W/O DYE: CPT | Mod: 26,MG

## 2024-02-22 PROCEDURE — 99285 EMERGENCY DEPT VISIT HI MDM: CPT | Mod: FS

## 2024-02-22 PROCEDURE — 72125 CT NECK SPINE W/O DYE: CPT | Mod: 26,MG

## 2024-02-22 PROCEDURE — 76376 3D RENDER W/INTRP POSTPROCES: CPT | Mod: 26

## 2024-02-22 PROCEDURE — G1004: CPT

## 2024-02-22 PROCEDURE — 73030 X-RAY EXAM OF SHOULDER: CPT | Mod: 26,LT

## 2024-02-22 RX ORDER — UBIDECARENONE 100 MG
1 CAPSULE ORAL
Qty: 0 | Refills: 0 | DISCHARGE

## 2024-02-22 RX ORDER — TRAMADOL HYDROCHLORIDE 50 MG/1
50 TABLET ORAL EVERY 6 HOURS
Refills: 0 | Status: DISCONTINUED | OUTPATIENT
Start: 2024-02-22 | End: 2024-02-26

## 2024-02-22 RX ORDER — AMLODIPINE BESYLATE 2.5 MG/1
2.5 TABLET ORAL DAILY
Refills: 0 | Status: DISCONTINUED | OUTPATIENT
Start: 2024-02-22 | End: 2024-02-26

## 2024-02-22 RX ORDER — PYRIDOXINE HCL (VITAMIN B6) 100 MG
2 TABLET ORAL
Qty: 0 | Refills: 0 | DISCHARGE

## 2024-02-22 RX ORDER — ATORVASTATIN CALCIUM 80 MG/1
40 TABLET, FILM COATED ORAL AT BEDTIME
Refills: 0 | Status: DISCONTINUED | OUTPATIENT
Start: 2024-02-22 | End: 2024-02-26

## 2024-02-22 RX ORDER — ONDANSETRON 8 MG/1
4 TABLET, FILM COATED ORAL EVERY 6 HOURS
Refills: 0 | Status: DISCONTINUED | OUTPATIENT
Start: 2024-02-22 | End: 2024-02-26

## 2024-02-22 RX ORDER — SODIUM CHLORIDE 9 MG/ML
500 INJECTION INTRAMUSCULAR; INTRAVENOUS; SUBCUTANEOUS ONCE
Refills: 0 | Status: COMPLETED | OUTPATIENT
Start: 2024-02-22 | End: 2024-02-22

## 2024-02-22 RX ORDER — ACETAMINOPHEN 500 MG
650 TABLET ORAL EVERY 6 HOURS
Refills: 0 | Status: DISCONTINUED | OUTPATIENT
Start: 2024-02-22 | End: 2024-02-26

## 2024-02-22 RX ORDER — FLUVASTATIN SODIUM 80 MG/1
1 TABLET, FILM COATED, EXTENDED RELEASE ORAL
Qty: 0 | Refills: 0 | DISCHARGE

## 2024-02-22 RX ORDER — LANOLIN ALCOHOL/MO/W.PET/CERES
3 CREAM (GRAM) TOPICAL AT BEDTIME
Refills: 0 | Status: DISCONTINUED | OUTPATIENT
Start: 2024-02-22 | End: 2024-02-26

## 2024-02-22 RX ORDER — ALPRAZOLAM 0.25 MG
25 TABLET ORAL
Qty: 0 | Refills: 0 | DISCHARGE

## 2024-02-22 RX ORDER — ENOXAPARIN SODIUM 100 MG/ML
40 INJECTION SUBCUTANEOUS EVERY 24 HOURS
Refills: 0 | Status: DISCONTINUED | OUTPATIENT
Start: 2024-02-22 | End: 2024-02-26

## 2024-02-22 RX ORDER — TRAMADOL HYDROCHLORIDE 50 MG/1
25 TABLET ORAL EVERY 6 HOURS
Refills: 0 | Status: DISCONTINUED | OUTPATIENT
Start: 2024-02-22 | End: 2024-02-26

## 2024-02-22 RX ORDER — HYDROMORPHONE HYDROCHLORIDE 2 MG/ML
0.5 INJECTION INTRAMUSCULAR; INTRAVENOUS; SUBCUTANEOUS ONCE
Refills: 0 | Status: DISCONTINUED | OUTPATIENT
Start: 2024-02-22 | End: 2024-02-22

## 2024-02-22 RX ORDER — MULTIVIT-MIN/FERROUS GLUCONATE 9 MG/15 ML
1 LIQUID (ML) ORAL
Qty: 0 | Refills: 0 | DISCHARGE

## 2024-02-22 RX ORDER — AMLODIPINE BESYLATE 2.5 MG/1
1 TABLET ORAL
Qty: 0 | Refills: 0 | DISCHARGE

## 2024-02-22 RX ADMIN — ATORVASTATIN CALCIUM 40 MILLIGRAM(S): 80 TABLET, FILM COATED ORAL at 22:22

## 2024-02-22 RX ADMIN — TRAMADOL HYDROCHLORIDE 50 MILLIGRAM(S): 50 TABLET ORAL at 20:41

## 2024-02-22 RX ADMIN — HYDROMORPHONE HYDROCHLORIDE 0.5 MILLIGRAM(S): 2 INJECTION INTRAMUSCULAR; INTRAVENOUS; SUBCUTANEOUS at 15:05

## 2024-02-22 RX ADMIN — SODIUM CHLORIDE 500 MILLILITER(S): 9 INJECTION INTRAMUSCULAR; INTRAVENOUS; SUBCUTANEOUS at 19:02

## 2024-02-22 NOTE — PHYSICAL THERAPY INITIAL EVALUATION ADULT - ACTIVE RANGE OF MOTION EXAMINATION, REHAB EVAL
unable to assess LUE due to fx/Right UE Active ROM was WFL (within functional limits)/bilateral  lower extremity Active ROM was WFL (within functional limits)

## 2024-02-22 NOTE — ED ADULT NURSE NOTE - NSFALLHARMRISKINTERV_ED_ALL_ED
Assistance OOB with selected safe patient handling equipment if applicable/Assistance with ambulation/Communicate risk of Fall with Harm to all staff, patient, and family/Monitor gait and stability/Monitor for mental status changes and reorient to person, place, and time, as needed/Provide visual cue: red socks, yellow wristband, yellow gown, etc/Reinforce activity limits and safety measures with patient and family/Toileting schedule using arm’s reach rule for commode and bathroom/Use of alarms - bed, stretcher, chair and/or video monitoring/Bed in lowest position, wheels locked, appropriate side rails in place/Call bell, personal items and telephone in reach/Instruct patient to call for assistance before getting out of bed/chair/stretcher/Non-slip footwear applied when patient is off stretcher/[place] to call system/Physically safe environment - no spills, clutter or unnecessary equipment/Purposeful Proactive Rounding/Room/bathroom lighting operational, light cord in reach

## 2024-02-22 NOTE — PHYSICAL THERAPY INITIAL EVALUATION ADULT - ADDITIONAL COMMENTS
Pt lives at home with wife in a private house with 3 [address]. Bedroom and bathroom on second floor, 13 steps. Wife states no shower chair and helps pt complete all ADLs, but ambulated independently with no AD.

## 2024-02-22 NOTE — CONSULT NOTE ADULT - SUBJECTIVE AND OBJECTIVE BOX
Pt Name: DUSTIN JUNIOR    MRN: [id number]    HPI: Patient is a 89y Male who presents to ER s/p fall today with Left Displaced proximal humerus fracture. At baseline, patient is has dementia and does not follow commands. Wife is at bedside, and assisted with providing history and events. Per wife, patient was found on the floor near the bed this morning, sitting up. Wife and patient does not recall fall, unaware of HS, LOC, etc. Wife states that patient did not complain of any other acute injuries. Wife states that at baseline, patient ambulates independently and is right hand dominant.     PAST MEDICAL & SURGICAL HISTORY:  Hypertension  Hyperlipidemia  Anxiety  DM (diabetes mellitus)  Dementia  Malignant melanoma of left lower limb, including hip  Mild CAD  carotids  Cancer of skin  History of kidney surgery  remove cancer  H/O inguinal hernia repair        Allergies: enalapril (Unknown)  amoxicillin (Unknown)              PHYSICAL EXAM:    Vital Signs Last 24 Hrs  T(C): 36.8 (22 Feb 2024 12:25), Max: 36.8 (22 Feb 2024 12:25)  T(F): 98.3 (22 Feb 2024 12:25), Max: 98.3 (22 Feb 2024 12:25)  HR: 80 (22 Feb 2024 12:25) (80 - 80)  BP: 114/66 (22 Feb 2024 12:25) (114/66 - 114/66)  BP(mean): --  RR: 16 (22 Feb 2024 12:25) (16 - 16)  SpO2: 98% (22 Feb 2024 12:25) (98% - 98%)    Parameters below as of 22 Feb 2024 12:25  Patient On (Oxygen Delivery Method): room air        Physical Exam:  Exam limited due to patients mental status.   General: NAD, AxOx1, Demented  LEFT UE: No open skin. +ecchymosis about the shoulder until the elbow on the lateral aspect of arm. +edema noted. No pain with passive ROM of elbow or wrist. Able to move all fingers, SILT, 2+ pulse.     Secondary Survey:   RLE/LLE/RUE: No TTP over bony prominences, SILT, palpable pulses, full/painless range of motion, compartments soft      Imaging: Xray Shoulder and Humerus- displaced left proximal humerus fracture     Orthopedic A/P:    Pt is a  89y Male with displaced left proximal humerus fracture     Procedure:  IV Dilaudid was administered by ER and closed reduction was performed and a sling was applied. The patient tolerated the procedure well and there we no complications. The patient's post-reduction neurovascular exam was unchanged. Post-reduction xrays demonstrated acceptable alignment.      -Pain control PRN  -NWB  -Follow up with Dr. Cummings  within 1 week. Please call the office to make an appointment.   -Discussed with Dr. Cummings who is aware and approves of plan.      Pt Name: DUSTIN JUNIOR    MRN: [id number]    HPI: Patient is a 89y Male who presents to ER s/p fall today with Left Displaced proximal humerus fracture. At baseline, patient is has dementia and does not follow commands. Wife is at bedside, and assisted with providing history and events. Per wife, patient was found on the floor near the bed this morning, sitting up. Wife and patient does not recall fall, unaware of HS, LOC, etc. Wife states that patient did not complain of any other acute injuries. Wife states that at baseline, patient ambulates independently and is right hand dominant.     PAST MEDICAL & SURGICAL HISTORY:  Hypertension  Hyperlipidemia  Anxiety  DM (diabetes mellitus)  Dementia  Malignant melanoma of left lower limb, including hip  Mild CAD  carotids  Cancer of skin  History of kidney surgery  remove cancer  H/O inguinal hernia repair        Allergies: enalapril (Unknown)  amoxicillin (Unknown)              PHYSICAL EXAM:    Vital Signs Last 24 Hrs  T(C): 36.8 (22 Feb 2024 12:25), Max: 36.8 (22 Feb 2024 12:25)  T(F): 98.3 (22 Feb 2024 12:25), Max: 98.3 (22 Feb 2024 12:25)  HR: 80 (22 Feb 2024 12:25) (80 - 80)  BP: 114/66 (22 Feb 2024 12:25) (114/66 - 114/66)  BP(mean): --  RR: 16 (22 Feb 2024 12:25) (16 - 16)  SpO2: 98% (22 Feb 2024 12:25) (98% - 98%)    Parameters below as of 22 Feb 2024 12:25  Patient On (Oxygen Delivery Method): room air        Physical Exam:  Exam limited due to patients mental status.   General: NAD, AxOx1, Demented  LEFT UE: No open skin. +ecchymosis about the shoulder until the elbow on the lateral aspect of arm. +edema noted. No pain with passive ROM of elbow or wrist. Able to move all fingers, SILT, 2+ pulse.     Secondary Survey:   RLE/LLE/RUE: No TTP over bony prominences, SILT, palpable pulses, full/painless range of motion, compartments soft      Imaging: Xray Shoulder and Humerus- displaced left proximal humerus fracture     Orthopedic A/P:    Pt is a  89y Male with displaced left proximal humerus fracture     Procedure:  IV Dilaudid was administered by ER and closed reduction was performed and a sling was applied. The patient tolerated the procedure well and there we no complications. The patient's post-reduction neurovascular exam was unchanged. Post-reduction xrays demonstrated acceptable alignment.      -Pain control PRN  -NWB  -Follow up with Dr. Cummings  within 1 week. Please call the office to make an appointment.   -In the event, the patient and family decide on surgical intervention, please hold all Diabetic SGLT Inhibitors.   -Discussed with Dr. Cummings who is aware and approves of plan.      Pt Name: DUSTIN JUNIOR    MRN: [id number]    HPI: Patient is a 89y Male who presents to ER s/p fall today with Left Displaced proximal humerus fracture. At baseline, patient is has dementia and does not follow commands. Wife is at bedside, and assisted with providing history and events. Per wife, patient was found on the floor near the bed this morning, sitting up. Wife and patient does not recall fall, unaware of HS, LOC, etc. Wife states that patient did not complain of any other acute injuries. Wife states that at baseline, patient ambulates independently and is right hand dominant.     PAST MEDICAL & SURGICAL HISTORY:  Hypertension  Hyperlipidemia  Anxiety  DM (diabetes mellitus)  Dementia  Malignant melanoma of left lower limb, including hip  Mild CAD  carotids  Cancer of skin  History of kidney surgery  remove cancer  H/O inguinal hernia repair        Allergies: enalapril (Unknown)  amoxicillin (Unknown)              PHYSICAL EXAM:    Vital Signs Last 24 Hrs  T(C): 36.8 (22 Feb 2024 12:25), Max: 36.8 (22 Feb 2024 12:25)  T(F): 98.3 (22 Feb 2024 12:25), Max: 98.3 (22 Feb 2024 12:25)  HR: 80 (22 Feb 2024 12:25) (80 - 80)  BP: 114/66 (22 Feb 2024 12:25) (114/66 - 114/66)  BP(mean): --  RR: 16 (22 Feb 2024 12:25) (16 - 16)  SpO2: 98% (22 Feb 2024 12:25) (98% - 98%)    Parameters below as of 22 Feb 2024 12:25  Patient On (Oxygen Delivery Method): room air        Physical Exam:  Exam limited due to patients mental status.   General: NAD, AxOx1 with hx of dementia  LEFT UE: No open skin. +ecchymosis about the shoulder until the elbow on the lateral aspect of arm. +edema noted. No pain with passive ROM of elbow or wrist. Able to move all fingers, SILT, 2+ pulse.     Secondary Survey:   RLE/LLE/RUE: No TTP over bony prominences, SILT, palpable pulses, full/painless range of motion, compartments soft      Imaging: Xray Shoulder and Humerus- displaced left proximal humerus fracture     Orthopedic A/P:    Pt is a  89y Male with displaced left proximal humerus fracture     Procedure:  IV Dilaudid was administered by ER and closed reduction was performed and a sling was applied. The patient tolerated the procedure well and there we no complications. The patient's post-reduction neurovascular exam was unchanged. Post-reduction xrays demonstrated acceptable alignment.    -Pain control PRN  -NWB in sling  -Follow up with Dr. Cummings  within 1 week. Please call the office to make an appointment.   -No acute orthopedic surgical intervention at this time  -In the event, the patient and family decide on surgical intervention, please hold all Diabetic SGLT Inhibitors.   -Discussed with Dr. Cummings who is aware and approves of plan.

## 2024-02-22 NOTE — PATIENT PROFILE ADULT - FALL HARM RISK - RISK INTERVENTIONS
Assistance OOB with selected safe patient handling equipment/Assistance with ambulation/Communicate Fall Risk and Risk Factors to all staff, patient, and family/Discuss with provider need for PT consult/Monitor for mental status changes/Monitor gait and stability/Move patient closer to nurses' station/Reinforce activity limits and safety measures with patient and family/Reorient to person, place and time as needed/Toileting schedule using arm’s reach rule for commode and bathroom/Use of alarms - bed, chair and/or voice tab/Visual Cue: Yellow wristband/Bed in lowest position, wheels locked, appropriate side rails in place/Call bell, personal items and telephone in reach/Instruct patient to call for assistance before getting out of bed or chair/Non-slip footwear when patient is out of bed/[place] to call system/Physically safe environment - no spills, clutter or unnecessary equipment/Purposeful Proactive Rounding/Room/bathroom lighting operational, light cord in reach

## 2024-02-22 NOTE — ED PROVIDER NOTE - PROGRESS NOTE DETAILS
pt was seen by ortho - will return to see if they can get bones in better alignment - not surgical candidate - requesting dilaudid for procedure - wife remains at bedside, aware of plan - PT to see pt as well, likely will require MANDY

## 2024-02-22 NOTE — PATIENT PROFILE ADULT - FUNCTIONAL ASSESSMENT - BASIC MOBILITY 6.
1-calculated by average/Not able to assess (calculate score using [facility] averaging method)

## 2024-02-22 NOTE — ED ADULT NURSE NOTE - ALCOHOL PRE SCREEN (AUDIT - C)
SHIFT SUMMARY: 
 
0700: bedside report received from [facility], [address] [facility]. Assumed patient care. 1130: Patient up in chair with PT assistance, requesting to get back in bed as she isn't comfortable. Patient encouraged to sit up in chair as long as possible. Patient agreeable. 1430: Patient assisted back to bed 
 
1500: Patient currently back in AFIB. HR sustaining between 120's-150's, however, is sleeping peacefully/ asymptomatic. All other vitals stable. Dr. Coleen Haywood notified. Will be by shortly to see patient. 1900: Bedside report given to ADY Velazquez. Assumed patient care. [facility] NURSING NOTE Admission Date 7/28/2018 Admission Diagnosis Atrial flutter with rapid ventricular response ([address] Consults IP CONSULT TO CARDIOLOGY Cardiac Monitoring [x] Yes [] No  
  
Purposeful Hourly Rounding [x] Yes   
Rose Mary Score Total Score: 5 Rose Mary score 3 or > [x] Bed Alarm [] Avasys [x] 1:1 sitter [] Patient refused (Signed refusal form in chart) Seng Score Seng Score: 16 Seng score 14 or < [] PMT consult [] Wound Care consult  
 []  Specialty bed  [] Nutrition consult Influenza Vaccine Received Flu Vaccine for Current Season (usually Sept-March): Not Flu Season Oxygen needs? [x] Room air Oxygen @  []1L    []2L    []3L   []4L    []5L   []6L via NC Chronic home O2 use? [] Yes [] No 
Perform O2 challenge test and document in progress note using smartphrase (.Homeoxygen) Last bowel movement Urinary Catheter LDAs Peripheral IV 07/28/18 Left Antecubital (Active) Site Assessment Clean, dry, & intact 7/31/2018  4:48 PM  
Phlebitis Assessment 0 7/31/2018  4:48 PM  
Infiltration Assessment 0 7/31/2018  4:48 PM  
Dressing Status Clean, dry, & intact 7/31/2018  4:48 PM  
Dressing Type Tape;Transparent 7/31/2018  4:48 PM  
Hub Color/Line Status Pink;Flushed;Patent 7/31/2018  4:48 PM  
Action Taken Blood drawn 7/28/2018  2:52 PM  
   
Peripheral IV 07/29/18 Right Arm (Active) Site Assessment Clean, dry, & intact 7/31/2018  4:48 PM  
Phlebitis Assessment 0 7/31/2018  4:48 PM  
Infiltration Assessment 0 7/31/2018  4:48 PM  
Dressing Status Clean, dry, & intact 7/31/2018  4:48 PM  
Dressing Type Tape;Transparent 7/31/2018  4:48 PM  
Hub Color/Line Status Blue;Flushed;Patent 7/31/2018  4:48 PM  
                  
  
Readmission Risk Assessment Tool Score Medium Risk   
      
 17 Total Score 3 Has Seen PCP in Last 6 Months (Yes=3, No=0)  
 4 IP Visits Last 12 Months (1-3=4, 4=9, >4=11) 5 Pt. Coverage (Medicare=5 , Medicaid, or Self-Pay=4) 5 Charlson Comorbidity Score (Age + Comorbid Conditions) Criteria that do not apply:  
 . Living with Significant Other. Assisted Living. LTAC. SNF. or  
Rehab Patient Length of Stay (>5 days = 3) Expected Length of Stay 3d 7h Actual Length of Stay 3  
  
 
 
 Statement Selected

## 2024-02-22 NOTE — ED PROVIDER NOTE - OBJECTIVE STATEMENT
89 M with dementia BIBEMS from home, accompanied by wife, due to fall. Wife states she found him on the floor next to the bed. States recently he has been more unsteady and tired than usual, concerned about infection. Currently at baseline mental status. Per EMS they noted pt has pain to shoulder, wife states it is bruised and swollen.

## 2024-02-22 NOTE — ED PROVIDER NOTE - MUSCULOSKELETAL, MLM
No midline tenderness. FROM RUE, BLE. No hip/pelvic tenderness. LUE: bruising and tenderness to proximal humerus, no clavicular tenderness, remainder of extremity unremarkable, +NVI.

## 2024-02-22 NOTE — PHYSICAL THERAPY INITIAL EVALUATION ADULT - PERTINENT HX OF CURRENT PROBLEM, REHAB EVAL
89 M with dementia BIBEMS from home, accompanied by wife, due to fall. Wife states she found him on the floor next to the bed. States recently he has been more unsteady and tired than usual, concerned about infection. Currently at baseline mental status. Per EMS they noted pt has pain to shoulder, wife states it is bruised and swollen. Pt found to have left humeral fx.

## 2024-02-22 NOTE — ED ADULT NURSE NOTE - ED STAT RN HANDOFF DETAILS
Report given to RN Morena, VS stable. Pt at baseline mental status. Pt to be taken by transport to IP room.

## 2024-02-22 NOTE — ED PROVIDER NOTE - CARE PLAN
Principal Discharge DX:	Multiple falls  Secondary Diagnosis:	Closed fracture of anatomical neck of left humerus   1

## 2024-02-22 NOTE — CONSULT NOTE ADULT - SUBJECTIVE AND OBJECTIVE BOX
Pt Name: DUSTIN JUNIOR    MRN: [id number]    HPI: Patient is a 89y Male who presents to ER s/p fall today with Left Displaced proximal humerus fracture. At baseline, patient is has dementia and does not follow commands. Wife is at bedside, and assisted with providing history and events. Per wife, patient was found on the floor near the bed this morning, sitting up. Wife and patient does not recall fall, unaware of HS, LOC, etc. Wife states that patient did not complain of any other acute injuries. Wife states that at baseline, patient ambulates independently and is right hand dominant.     PAST MEDICAL & SURGICAL HISTORY:  Hypertension  Hyperlipidemia  Anxiety  DM (diabetes mellitus)  Dementia  Malignant melanoma of left lower limb, including hip  Mild CAD  carotids  Cancer of skin  History of kidney surgery  remove cancer  H/O inguinal hernia repair        Allergies: enalapril (Unknown)  amoxicillin (Unknown)              PHYSICAL EXAM:    Vital Signs Last 24 Hrs  T(C): 36.8 (22 Feb 2024 12:25), Max: 36.8 (22 Feb 2024 12:25)  T(F): 98.3 (22 Feb 2024 12:25), Max: 98.3 (22 Feb 2024 12:25)  HR: 80 (22 Feb 2024 12:25) (80 - 80)  BP: 114/66 (22 Feb 2024 12:25) (114/66 - 114/66)  BP(mean): --  RR: 16 (22 Feb 2024 12:25) (16 - 16)  SpO2: 98% (22 Feb 2024 12:25) (98% - 98%)    Parameters below as of 22 Feb 2024 12:25  Patient On (Oxygen Delivery Method): room air        Physical Exam:  Exam limited due to patients mental status.   General: NAD, AxOx1, Demented  LEFT UE: No open skin. +ecchymosis about the shoulder until the elbow on the lateral aspect of arm. +edema noted. No pain with passive ROM of elbow or wrist. Able to move all fingers, SILT, 2+ pulse.     Secondary Survey:   RLE/LLE/RUE: No TTP over bony prominences, SILT, palpable pulses, full/painless range of motion, compartments soft      Imaging: Xray Shoulder and Humerus- displaced left proximal humerus fracture     Orthopedic A/P:    Pt is a  89y Male with displaced left proximal humerus fracture     Procedure:  IV Dilaudid was administered by ER and closed reduction was performed and a sling was applied. The patient tolerated the procedure well and there we no complications. The patient's post-reduction neurovascular exam was unchanged. Post-reduction xrays demonstrated acceptable alignment.      -Pain control PRN  -NWB  -Follow up with Dr. Khan within 1 week. Please call the office to make an appointment.   -Discussed with Dr. Khan who is aware and approves of plan.

## 2024-02-22 NOTE — H&P ADULT - HISTORY OF PRESENT ILLNESS
89 M with dementia HLD BIBEMS from home, accompanied by wife, due to fall. Wife states she found him on the floor next to the bed. States recently he has been more unsteady and tired than usual, concerned about infection. Currently at baseline mental status. Per EMS they noted pt has pain to shoulder, wife states it is bruised and swollen. Patient has had multiple falls this week. Patient walks independently in the house.

## 2024-02-23 DIAGNOSIS — R50.9 FEVER, UNSPECIFIED: ICD-10-CM

## 2024-02-23 LAB
ANION GAP SERPL CALC-SCNC: 6 MMOL/L — SIGNIFICANT CHANGE UP (ref 5–17)
BUN SERPL-MCNC: 25 MG/DL — HIGH (ref 7–23)
CALCIUM SERPL-MCNC: 8.9 MG/DL — SIGNIFICANT CHANGE UP (ref 8.5–10.1)
CHLORIDE SERPL-SCNC: 106 MMOL/L — SIGNIFICANT CHANGE UP (ref 96–108)
CO2 SERPL-SCNC: 31 MMOL/L — SIGNIFICANT CHANGE UP (ref 22–31)
CREAT SERPL-MCNC: 0.82 MG/DL — SIGNIFICANT CHANGE UP (ref 0.5–1.3)
CULTURE RESULTS: SIGNIFICANT CHANGE UP
EGFR: 83 ML/MIN/1.73M2 — SIGNIFICANT CHANGE UP
GLUCOSE SERPL-MCNC: 138 MG/DL — HIGH (ref 70–99)
HCT VFR BLD CALC: 32 % — LOW (ref 39–50)
HGB BLD-MCNC: 11 G/DL — LOW (ref 13–17)
LACTATE SERPL-SCNC: 1.7 MMOL/L — SIGNIFICANT CHANGE UP (ref 0.7–2)
LACTATE SERPL-SCNC: 2.9 MMOL/L — HIGH (ref 0.7–2)
MAGNESIUM SERPL-MCNC: 2 MG/DL — SIGNIFICANT CHANGE UP (ref 1.6–2.6)
MCHC RBC-ENTMCNC: 34.4 GM/DL — SIGNIFICANT CHANGE UP (ref 32–36)
MCHC RBC-ENTMCNC: 37.5 PG — HIGH (ref 27–34)
MCV RBC AUTO: 109.2 FL — HIGH (ref 80–100)
NRBC # BLD: 0 /100 WBCS — SIGNIFICANT CHANGE UP (ref 0–0)
PLATELET # BLD AUTO: 350 K/UL — SIGNIFICANT CHANGE UP (ref 150–400)
POTASSIUM SERPL-MCNC: 5.5 MMOL/L — HIGH (ref 3.5–5.3)
POTASSIUM SERPL-SCNC: 5.5 MMOL/L — HIGH (ref 3.5–5.3)
PROCALCITONIN SERPL-MCNC: 0.15 NG/ML — HIGH
RBC # BLD: 2.93 M/UL — LOW (ref 4.2–5.8)
RBC # FLD: 12.8 % — SIGNIFICANT CHANGE UP (ref 10.3–14.5)
SODIUM SERPL-SCNC: 143 MMOL/L — SIGNIFICANT CHANGE UP (ref 135–145)
SPECIMEN SOURCE: SIGNIFICANT CHANGE UP
WBC # BLD: 10.59 K/UL — HIGH (ref 3.8–10.5)
WBC # FLD AUTO: 10.59 K/UL — HIGH (ref 3.8–10.5)

## 2024-02-23 PROCEDURE — 71045 X-RAY EXAM CHEST 1 VIEW: CPT | Mod: 26

## 2024-02-23 RX ORDER — ACETAMINOPHEN 500 MG
1000 TABLET ORAL ONCE
Refills: 0 | Status: COMPLETED | OUTPATIENT
Start: 2024-02-23 | End: 2024-02-23

## 2024-02-23 RX ORDER — SODIUM CHLORIDE 9 MG/ML
1000 INJECTION INTRAMUSCULAR; INTRAVENOUS; SUBCUTANEOUS
Refills: 0 | Status: DISCONTINUED | OUTPATIENT
Start: 2024-02-23 | End: 2024-02-23

## 2024-02-23 RX ADMIN — Medication 650 MILLIGRAM(S): at 06:49

## 2024-02-23 RX ADMIN — SODIUM CHLORIDE 100 MILLILITER(S): 9 INJECTION INTRAMUSCULAR; INTRAVENOUS; SUBCUTANEOUS at 09:35

## 2024-02-23 RX ADMIN — AMLODIPINE BESYLATE 2.5 MILLIGRAM(S): 2.5 TABLET ORAL at 05:49

## 2024-02-23 RX ADMIN — Medication 1000 MILLIGRAM(S): at 08:01

## 2024-02-23 RX ADMIN — ENOXAPARIN SODIUM 40 MILLIGRAM(S): 100 INJECTION SUBCUTANEOUS at 17:12

## 2024-02-23 RX ADMIN — Medication 650 MILLIGRAM(S): at 05:49

## 2024-02-23 RX ADMIN — Medication 400 MILLIGRAM(S): at 06:23

## 2024-02-23 RX ADMIN — ATORVASTATIN CALCIUM 40 MILLIGRAM(S): 80 TABLET, FILM COATED ORAL at 21:13

## 2024-02-23 NOTE — CONSULT NOTE ADULT - ASSESSMENT
89 M with dementia HLD BIBEMS from home, accompanied by wife, due to fall.   ID c/s for fever Tm 101.2F this AM    Now afebrile. Slight leukocytosis on admission, now downtrending  Mental status stable  Denies URI sx, cough  No s/s cellulitis  Abdominal exam benign    Recommendations:   Hold Abx  F/u UA  F/u pending UCx/BCx  F/u CXR  Fever can be in setting of pain, will trend temps/WBC  Appreciate neuro  LIkely MANDY  additional care per primary team    D/w Dr. Fannie Theodore covering weekend service  ID will follow  Please call with any questions.   Rosa Maria Jones M.D.  [facility] Division of Infectious Diseases [phone]  For after 5 P.M. and weekends, please call [phone] 89 M with dementia HLD BIBEMS from home, accompanied by wife, due to fall.   ID c/s for fever Tm 101.2F this AM    Now afebrile. Slight leukocytosis on admission, now downtrending  Mental status stable  Denies URI sx, cough  No s/s cellulitis  Abdominal exam benign    UA negative  RVP negative    Recommendations:   Hold Abx  F/u pending UCx/BCx  F/u CXR read  Fever can be in setting of pain, will trend temps/WBC  Appreciate neuro  additional care per primary team    D/w Dr. Fannie Theodore covering weekend service  Infectious Diseases will continue to follow. Please call with any questions.   Rosa Maria Jones M.D.  [facility] Division of Infectious Diseases [phone]  For after 5 P.M. and weekends, please call [phone]

## 2024-02-23 NOTE — CARE COORDINATION ASSESSMENT. - NSCAREPROVIDERS_GEN_ALL_CORE_FT
CARE PROVIDERS:  Accepting Physician: Cuba Greco  Administration: Yuriy Baez  Admitting: Cuba Greco  Attending: Cuba Greco  Case Management: Sophia Ochoa  Consultant: Janes Allison  Consultant: Michael Ron  Consultant: Rosa Maria Jones  Consultant: Heriberto Lazo  Consultant: Tristen Montoya  Covering Team: Anoop Appiah  Covering Team: Cuba Greco  ED ACP: Isatu Mirza  ED Attending: Meenu Álvarez  ED Nurse: Wyatt Kulkarni  Nurse: Jazzmine Guerrero  Nurse: Barbi Laird  Nurse: Aida Duckworth  Ordered: ADM, User  Ordered: ServiceAccount, SCMMLM  Ordered: ServiceAccount, SCMMLM  Override: Damian Heaton  Override: Yasmani White  Override: Mendoza Alvares  Physical Therapy: Ester Srinivasan  Physical Therapy: Shruthi Tsang  Respiratory Therapy: Andrade Paulson  : Kinsey Higuera

## 2024-02-23 NOTE — CONSULT NOTE ADULT - SUBJECTIVE AND OBJECTIVE BOX
[facility], Division of Infectious Diseases  KAYLENE Villafana S. Shah, Y. Patel, [facility]  [phone]    DUSTIN JUNIOR  89y, Male  [id number]    HPI--  HPI:  89 M with dementia HLD BIBEMS from home, accompanied by wife, due to fall. Wife states she found him on the floor next to the bed. States recently he has been more unsteady and tired than usual, concerned about infection. Currently at baseline mental status. Per EMS they noted pt has pain to shoulder, wife states it is bruised and swollen. Patient has had multiple falls this week. Patient walks independently in the house.  (22 Feb 2024 15:52)    ID c/s for fever this AM  Pt denies sx  Wife at bedside reporting fever resolved  No change in baseline mental status per wife at this time    Active Medications--  acetaminophen     Tablet .. 650 milliGRAM(s) Oral every 6 hours PRN  aluminum hydroxide/magnesium hydroxide/simethicone Suspension 30 milliLiter(s) Oral every 4 hours PRN  amLODIPine   Tablet 2.5 milliGRAM(s) Oral daily  atorvastatin 40 milliGRAM(s) Oral at bedtime  enoxaparin Injectable 40 milliGRAM(s) SubCutaneous every 24 hours  melatonin 3 milliGRAM(s) Oral at bedtime PRN  ondansetron Injectable 4 milliGRAM(s) IV Push every 6 hours PRN  traMADol 25 milliGRAM(s) Oral every 6 hours PRN  traMADol 50 milliGRAM(s) Oral every 6 hours PRN    Antimicrobials:     Immunologic:     ROS:  unable to obtain    Allergies: enalapril (Unknown)  amoxicillin (Unknown)    PMH -- Hypertension    Hyperlipidemia    Anxiety    DM (diabetes mellitus)    Dementia    Malignant melanoma of left lower limb, including hip    Mild CAD    Cancer of skin      PSH -- No significant past surgical history    History of kidney surgery    H/O inguinal hernia repair      FH --   Social History --  EtOH: denies   Tobacco: denies   Drug Use: denies     Travel/Environmental/Occupational History:    Physical Exam--  Vital Signs Last 24 Hrs  T(F): 98.5 (23 Feb 2024 11:50), Max: 101.2 (23 Feb 2024 05:24)  HR: 67 (23 Feb 2024 11:50) (67 - 78)  BP: 111/50 (23 Feb 2024 11:50) (108/49 - 136/65)  RR: 18 (23 Feb 2024 11:50) (18 - 19)  SpO2: 94% (23 Feb 2024 11:50) (92% - 99%)  General: nontoxic-appearing, no acute distress  HEENT: NC/AT, EOMI,  Lungs: Clear bilaterally without rales, wheezing or rhonchi  Heart: Regular rate and rhythm. No murmur, rub or gallop.  Abdomen: Soft. Nondistended. Nontender.   Extremities: shoulder in sling  Skin: Warm. Dry.   Laboratory & Imaging Data:  CBC:                       11.0   10.59 )-----------( 350      ( 23 Feb 2024 06:00 )             32.0     CMP: 02-23    143  |  106  |  25<H>  ----------------------------<  138<H>  5.5<H>   |  31  |  0.82    Ca    8.9      23 Feb 2024 06:00  Mg     2.0     02-23    TPro  6.2  /  Alb  3.4  /  TBili  1.0  /  DBili  x   /  AST  33  /  ALT  25  /  AlkPhos  112  02-22    LIVER FUNCTIONS - ( 22 Feb 2024 14:40 )  Alb: 3.4 g/dL / Pro: 6.2 g/dL / ALK PHOS: 112 U/L / ALT: 25 U/L / AST: 33 U/L / GGT: x           Urinalysis Basic - ( 23 Feb 2024 06:00 )    Color: x / Appearance: x / SG: x / pH: x  Gluc: 138 mg/dL / Ketone: x  / Bili: x / Urobili: x   Blood: x / Protein: x / Nitrite: x   Leuk Esterase: x / RBC: x / WBC x   Sq Epi: x / Non Sq Epi: x / Bacteria: x        Microbiology: reviewed        Radiology: reviewed    < from: CT Shoulder No Cont, Left (02.22.24 @ 17:59) >    ACC: [id number] EXAM:  CT 3D RECONSTRUCT WO ELIEL   ORDERED BY: MERLE CRUZ     ACC: [id number] EXAM:  CT SHOULDER ONLY LT   ORDERED BY: MERLE CRUZ     PROCEDURE DATE:  02/22/2024          INTERPRETATION:  EXAMINATION: CT SHOULDER LEFT, CT 3D RECONSTRUCTION WO   WORKSTATION    CLINICAL INDICATION:Fracture of the left proximal humerus. Evaluate by CT.    COMPARISON: Radiographs of the left humerus dated 2/22/2024    TECHNIQUE: CT of the left shoulder was performed without intravenous   contrast. 3-D imaging was also obtained.    INTERPRETATION:  : No additional findings.    Bones/joint: There is an acute fracture of the proximal shaft of the left   humerus. There is anteromedial displacement of the proximal fracture   segmentby approximately 1 shaft width with apex medial angulation at the   site of fracture and comminution. There are two dominant thin displaced   fracture fragments at the site of fracture measuring 2.3-2.8 cm.    There is normal alignment at the glenohumeral joint. There is no   significant AC joint arthrosis. There is no acute displaced rib fracture.    Soft tissues: There is surrounding edema and hematoma formation at the   site of fracture. There is marked surrounding subcutaneous edema   throughout the upper arm.    Other: There is aortic atherosclerosis. There is minimal dependent   atelectasis.    IMPRESSION:    Acute comminuted displaced fracture of the left proximal humerus detailed   above. 3-D imaging was also provided. Marked surrounding edema/hematoma   formation.    Prominent aortic atherosclerosis.    --- End of Report ---            SHLOMIT GOLDBERG STEIN MD; Attending Radiologist  This document has been electronically signed. Feb 22 2024  7:06PM    < end of copied text >  < from: Xray Humerus, Left (02.22.24 @ 13:21) >    ACC: [id number] EXAM:  XR HUMERUS MIN 2 VIEWS LT   ORDERED BY: RALPH FRAUSTO     PROCEDURE DATE:  02/22/2024          INTERPRETATION:  Fall.    2 views left humerus.    IMPRESSION: Acute displaced proximal humeral shaft fracture described in   aseparate report on left shoulder. No dislocation.    --- End of Report ---            DAVID FERGUSON MD; Attending Radiologist  This document has been electronically signed. Feb 23 2024  8:38AM    < end of copied text >  < from: CT Cervical Spine No Cont (02.22.24 @ 13:04) >    ACC: [id number] EXAM:  CT CERVICAL SPINE   ORDERED BY: RALPH FRAUSTO     ACC: [id number] EXAM:  CT BRAIN   ORDERED BY: RALPH FRAUSTO     PROCEDURE DATE:  02/22/2024          INTERPRETATION:  CT head without IV contrast    CLINICAL INFORMATION:  Fall   Intracranial hemorrhage.    TECHNIQUE: Contiguous axial 5 mm sections were obtained through the head.   Sagittal and coronal 2-D reformatted images were also obtained.   This   scan was performed using automatic exposure control (radiation dose   reduction software) to obtain a diagnostic image quality scan with   patient dose as low as reasonably achievable.    FINDINGS:   Prior CT head dated 02/02/2020 is available for review.    The brain demonstrates moderate periventricular and deep white matter   ischemia.   No acute cerebral cortical infarct is seen.  No intracranial   hemorrhage is found.  No mass effect is found in the brain.    The ventricles, sulci and basal cisterns show mild global atrophy most   prominent in the BILATERAL temporal lobes.    The orbits are unremarkable.  The paranasal sinuses are clear.  The nasal   cavity appears intact.  The nasopharynx is symmetric.  The central skull   base, petrous temporal bones and calvarium remain intact.        CT cervical spinewithout IV contrast    CLINICAL INFORMATION: Fracture, trauma, neck pain.  Neck pain, spinal   stenosis, spondylosis. fall    TECHNIQUE:  Contiguous axial 2.0 mm sections were obtained through the   cervical spine using a single helical acquisition.Additional 2 mm   sagittal and coronal reformatted reconstructions of the spine were   obtained.  These additional reformatted images were used to evaluate the   spine for alignment, vertebral fractures and the integrity of the the   posterior elements.   This scan was performed using automatic exposure   control (radiation dose reduction software) to obtain a diagnostic image   quality scan with patient dose as low as reasonably achievable.    FINDINGS:   No prior similar studies are available for review    Cervical vertebral body heights are maintained. No vertebral fracture is   seen. No destructive bone lesion is found.  Alignment is significant for   grade 1 anterior spondylolisthesis at C3-4 on a degenerative basis.    Facet joints appear intact and aligned. Calcified hypertrophied posterior   paraodontoid ligaments are noted.    Cervical intervertebral disc spaces demonstrate advanced degenerative   disc disease and spondylosis at C3-4 through C7-T1 with loss of disc   height and associated degenerative endplate changes. There is narrowing   of the BILATERAL C3-4, RIGHT C4-5, BILATERAL C5-6 and C6-7 neural   foramina due to uncovertebral spurring and facet osteophytic hypertrophy.   Posterior osteophytic ridge/disc complexes atC5-6 and C6-7 flatten the   ventral thecal sac.    The skull base appears intact.  No neck mass is recognized.  Paraspinal   soft tissues appear intact. Visualized lymph nodes appear to be within   physiologic size limits.        IMPRESSION:    CT HEAD: Moderate periventricular and deep white matter ischemia.    Mild   global atrophy most prominent in the BILATERAL temporal lobes.    CT cervical spine:   No vertebral fracture is recognized.  Grade 1   anterior spondylolisthesis at C3-4 on a degenerative basis.  Advanced   degenerative disc disease and spondylosis at C3-4 through C7-T1 with loss   of disc height and associated degenerative endplate changes. There is   narrowing of the BILATERAL C3-4, RIGHT C4-5, BILATERAL C5-6 and C6-7   neural foramina due to uncovertebral spurring and facet osteophytic   hypertrophy. Posterior osteophytic ridge/disc complexes at C5-6 and C6-7   flatten the ventral thecal sac.      --- End of Report ---            FAIZA DICKINSON MD; Attending Radiologist  This document has been electronically signed. Feb 22 2024  1:54PM    < end of copied text >   [facility], Division of Infectious Diseases  KAYLENE Villafana S. Shah, Y. Patel, [facility]  [phone]    DUSTIN JUNIOR  89y, Male  [id number]    HPI--  HPI:  89 M with dementia HLD BIBEMS from home, accompanied by wife, due to fall. Wife states she found him on the floor next to the bed. States recently he has been more unsteady and tired than usual, concerned about infection. Currently at baseline mental status. Per EMS they noted pt has pain to shoulder, wife states it is bruised and swollen. Patient has had multiple falls this week. Patient walks independently in the house.  (22 Feb 2024 15:52)    ID c/s for fever this AM  Pt denies sx  Wife at bedside reporting fever resolved  No change in baseline mental status per wife at this time    Active Medications--  acetaminophen     Tablet .. 650 milliGRAM(s) Oral every 6 hours PRN  aluminum hydroxide/magnesium hydroxide/simethicone Suspension 30 milliLiter(s) Oral every 4 hours PRN  amLODIPine   Tablet 2.5 milliGRAM(s) Oral daily  atorvastatin 40 milliGRAM(s) Oral at bedtime  enoxaparin Injectable 40 milliGRAM(s) SubCutaneous every 24 hours  melatonin 3 milliGRAM(s) Oral at bedtime PRN  ondansetron Injectable 4 milliGRAM(s) IV Push every 6 hours PRN  traMADol 25 milliGRAM(s) Oral every 6 hours PRN  traMADol 50 milliGRAM(s) Oral every 6 hours PRN    Antimicrobials:     Immunologic:     ROS:  unable to obtain    Allergies: enalapril (Unknown)  amoxicillin (Unknown)    PMH -- Hypertension    Hyperlipidemia    Anxiety    DM (diabetes mellitus)    Dementia    Malignant melanoma of left lower limb, including hip    Mild CAD    Cancer of skin      PSH -- No significant past surgical history    History of kidney surgery    H/O inguinal hernia repair      FH --   Social History --  EtOH: denies   Tobacco: denies   Drug Use: denies     Travel/Environmental/Occupational History:    Physical Exam--  Vital Signs Last 24 Hrs  T(F): 98.5 (23 Feb 2024 11:50), Max: 101.2 (23 Feb 2024 05:24)  HR: 67 (23 Feb 2024 11:50) (67 - 78)  BP: 111/50 (23 Feb 2024 11:50) (108/49 - 136/65)  RR: 18 (23 Feb 2024 11:50) (18 - 19)  SpO2: 94% (23 Feb 2024 11:50) (92% - 99%)  General: nontoxic-appearing, no acute distress  HEENT: NC/AT, EOMI,  Lungs: Clear bilaterally without rales, wheezing or rhonchi  Heart: Regular rate and rhythm. No murmur, rub or gallop.  Abdomen: Soft. Nondistended. Nontender.   Extremities: shoulder in sling  Skin: Warm. Dry.   Laboratory & Imaging Data:  CBC:                       11.0   10.59 )-----------( 350      ( 23 Feb 2024 06:00 )             32.0     CMP: 02-23    143  |  106  |  25<H>  ----------------------------<  138<H>  5.5<H>   |  31  |  0.82    Ca    8.9      23 Feb 2024 06:00  Mg     2.0     02-23    TPro  6.2  /  Alb  3.4  /  TBili  1.0  /  DBili  x   /  AST  33  /  ALT  25  /  AlkPhos  112  02-22    LIVER FUNCTIONS - ( 22 Feb 2024 14:40 )  Alb: 3.4 g/dL / Pro: 6.2 g/dL / ALK PHOS: 112 U/L / ALT: 25 U/L / AST: 33 U/L / GGT: x           Urinalysis Basic - ( 23 Feb 2024 06:00 )    Color: x / Appearance: x / SG: x / pH: x  Gluc: 138 mg/dL / Ketone: x  / Bili: x / Urobili: x   Blood: x / Protein: x / Nitrite: x   Leuk Esterase: x / RBC: x / WBC x   Sq Epi: x / Non Sq Epi: x / Bacteria: x        Microbiology: reviewed        Radiology: reviewed    < from: CT Shoulder No Cont, Left (02.22.24 @ 17:59) >    ACC: [id number] EXAM:  CT 3D RECONSTRUCT WO ELIEL   ORDERED BY: MERLE CRUZ     ACC: [id number] EXAM:  CT SHOULDER ONLY LT   ORDERED BY: MERLE CRUZ     PROCEDURE DATE:  02/22/2024          INTERPRETATION:  EXAMINATION: CT SHOULDER LEFT, CT 3D RECONSTRUCTION WO   WORKSTATION    CLINICAL INDICATION:Fracture of the left proximal humerus. Evaluate by CT.    COMPARISON: Radiographs of the left humerus dated 2/22/2024    TECHNIQUE: CT of the left shoulder was performed without intravenous   contrast. 3-D imaging was also obtained.    INTERPRETATION:  : No additional findings.    Bones/joint: There is an acute fracture of the proximal shaft of the left   humerus. There is anteromedial displacement of the proximal fracture   segmentby approximately 1 shaft width with apex medial angulation at the   site of fracture and comminution. There are two dominant thin displaced   fracture fragments at the site of fracture measuring 2.3-2.8 cm.    There is normal alignment at the glenohumeral joint. There is no   significant AC joint arthrosis. There is no acute displaced rib fracture.    Soft tissues: There is surrounding edema and hematoma formation at the   site of fracture. There is marked surrounding subcutaneous edema   throughout the upper arm.    Other: There is aortic atherosclerosis. There is minimal dependent   atelectasis.    IMPRESSION:    Acute comminuted displaced fracture of the left proximal humerus detailed   above. 3-D imaging was also provided. Marked surrounding edema/hematoma   formation.    Prominent aortic atherosclerosis.    --- End of Report ---            SHLOMIT GOLDBERG STEIN MD; Attending Radiologist  This document has been electronically signed. Feb 22 2024  7:06PM    < end of copied text >  < from: Xray Humerus, Left (02.22.24 @ 13:21) >    ACC: [id number] EXAM:  XR HUMERUS MIN 2 VIEWS LT   ORDERED BY: RALPH FRAUSTO     PROCEDURE DATE:  02/22/2024          INTERPRETATION:  Fall.    2 views left humerus.    IMPRESSION: Acute displaced proximal humeral shaft fracture described in   aseparate report on left shoulder. No dislocation.    --- End of Report ---            DAVID FERGUSON MD; Attending Radiologist  This document has been electronically signed. Feb 23 2024  8:38AM    < end of copied text >  < from: CT Cervical Spine No Cont (02.22.24 @ 13:04) >    ACC: [id number] EXAM:  CT CERVICAL SPINE   ORDERED BY: RALPH FRAUSTO     ACC: [id number] EXAM:  CT BRAIN   ORDERED BY: RALPH FRAUSTO     PROCEDURE DATE:  02/22/2024          INTERPRETATION:  CT head without IV contrast    CLINICAL INFORMATION:  Fall   Intracranial hemorrhage.    TECHNIQUE: Contiguous axial 5 mm sections were obtained through the head.   Sagittal and coronal 2-D reformatted images were also obtained.   This   scan was performed using automatic exposure control (radiation dose   reduction software) to obtain a diagnostic image quality scan with   patient dose as low as reasonably achievable.    FINDINGS:   Prior CT head dated 02/02/2020 is available for review.    The brain demonstrates moderate periventricular and deep white matter   ischemia.   No acute cerebral cortical infarct is seen.  No intracranial   hemorrhage is found.  No mass effect is found in the brain.    The ventricles, sulci and basal cisterns show mild global atrophy most   prominent in the BILATERAL temporal lobes.    The orbits are unremarkable.  The paranasal sinuses are clear.  The nasal   cavity appears intact.  The nasopharynx is symmetric.  The central skull   base, petrous temporal bones and calvarium remain intact.        CT cervical spinewithout IV contrast    CLINICAL INFORMATION: Fracture, trauma, neck pain.  Neck pain, spinal   stenosis, spondylosis. fall    TECHNIQUE:  Contiguous axial 2.0 mm sections were obtained through the   cervical spine using a single helical acquisition.Additional 2 mm   sagittal and coronal reformatted reconstructions of the spine were   obtained.  These additional reformatted images were used to evaluate the   spine for alignment, vertebral fractures and the integrity of the the   posterior elements.   This scan was performed using automatic exposure   control (radiation dose reduction software) to obtain a diagnostic image   quality scan with patient dose as low as reasonably achievable.    FINDINGS:   No prior similar studies are available for review    Cervical vertebral body heights are maintained. No vertebral fracture is   seen. No destructive bone lesion is found.  Alignment is significant for   grade 1 anterior spondylolisthesis at C3-4 on a degenerative basis.    Facet joints appear intact and aligned. Calcified hypertrophied posterior   paraodontoid ligaments are noted.    Cervical intervertebral disc spaces demonstrate advanced degenerative   disc disease and spondylosis at C3-4 through C7-T1 with loss of disc   height and associated degenerative endplate changes. There is narrowing   of the BILATERAL C3-4, RIGHT C4-5, BILATERAL C5-6 and C6-7 neural   foramina due to uncovertebral spurring and facet osteophytic hypertrophy.   Posterior osteophytic ridge/disc complexes atC5-6 and C6-7 flatten the   ventral thecal sac.    The skull base appears intact.  No neck mass is recognized.  Paraspinal   soft tissues appear intact. Visualized lymph nodes appear to be within   physiologic size limits.        IMPRESSION:    CT HEAD: Moderate periventricular and deep white matter ischemia.    Mild   global atrophy most prominent in the BILATERAL temporal lobes.    CT cervical spine:   No vertebral fracture is recognized.  Grade 1   anterior spondylolisthesis at C3-4 on a degenerative basis.  Advanced   degenerative disc disease and spondylosis at C3-4 through C7-T1 with loss   of disc height and associated degenerative endplate changes. There is   narrowing of the BILATERAL C3-4, RIGHT C4-5, BILATERAL C5-6 and C6-7   neural foramina due to uncovertebral spurring and facet osteophytic   hypertrophy. Posterior osteophytic ridge/disc complexes at C5-6 and C6-7   flatten the ventral thecal sac.      --- End of Report ---            FAIZA DICKINSON MD; Attending Radiologist  This document has been electronically signed. Feb 22 2024  1:54PM    < end of copied text >

## 2024-02-23 NOTE — OCCUPATIONAL THERAPY INITIAL EVALUATION ADULT - DIAGNOSIS, OT EVAL
Pt with NWB precautions, impaired LUE ROM/strength, and impaired cognition impacting ability to complete ADLs, IADLs, functional mobility/transfers.

## 2024-02-23 NOTE — OCCUPATIONAL THERAPY INITIAL EVALUATION ADULT - PERTINENT HX OF CURRENT PROBLEM, REHAB EVAL
As per H&P: "89 M with dementia HLD BIBEMS from home, accompanied by wife, due to fall. Wife states she found him on the floor next to the bed. States recently he has been more unsteady and tired than usual, concerned about infection. Currently at baseline mental status. Per EMS they noted pt has pain to shoulder, wife states it is bruised and swollen. Patient has had multiple falls this week. Patient walks independently in the house."   Admit dx: Repeated falls   CT Head: "Moderate periventricular and deep white matter ischemia. Mild global atrophy most prominent in the BILATERAL temporal lobes."  CT Cervical Spine: Negative impression for fx   X-Ray Humerus: "IMPRESSION: Acute displaced proximal humeral shaft fracture described in a separate report on left shoulder. No dislocation."

## 2024-02-23 NOTE — CARE COORDINATION ASSESSMENT. - NSPASTMEDSURGHISTORY_GEN_ALL_CORE_FT
PAST MEDICAL & SURGICAL HISTORY:  Anxiety      Hyperlipidemia      Hypertension      Cancer of skin      Mild CAD  carotids      Malignant melanoma of left lower limb, including hip      Dementia      DM (diabetes mellitus)      H/O inguinal hernia repair      History of kidney surgery  remove cancer

## 2024-02-23 NOTE — OCCUPATIONAL THERAPY INITIAL EVALUATION ADULT - RANGE OF MOTION EXAMINATION, UPPER EXTREMITY
LUE not assessed 2* NWB precautions and recent proximal humerus fracture./Right UE Active ROM was WFL (within functional limits)

## 2024-02-23 NOTE — OCCUPATIONAL THERAPY INITIAL EVALUATION ADULT - ADDITIONAL COMMENTS
Pt is a poor hx, alert but confused, with PMH of dementia. As per EMR, Pt lives in a private home with his wife with 3 [address] and 13 stairs inside the home to his bed/bath. PTA, pt required assist with all ADLs and was ambulating independently with no AD.   Pt completed supine <> sit with MaxAx1. Pt unable to complete further transfers/functional mobility 2* pt with impaired cognition & command follow and pt with non-verbal indicators of pain. Pt is a poor hx, alert but confused, with PMH of dementia. As per EMR, Pt lives in a private home with his wife with 3 [address] and 13 stairs inside the home to his bed/bath. PTA, pt required assist with all ADLs and was ambulating independently with no AD.   Pt completed supine <> sit with MaxAx2. Pt unable to complete further transfers/functional mobility 2* pt with impaired cognition & command follow and pt with non-verbal indicators of pain.

## 2024-02-23 NOTE — CARE COORDINATION ASSESSMENT. - OTHER PERTINENT DISCHARGE PLANNING INFORMATION:
Pt admitted s/p fall at home. Pt has dementia and is unable to be interviewed. SW met with the pt's wife who stated that she found him on the floor. Pt was ambulating independently prior to admission. Wife has been his caregiver with their son assisting as needed. Tx team is recommending subacute rehab. Subacute rehab list given to wife to review.

## 2024-02-23 NOTE — CHART NOTE - NSCHARTNOTEFT_GEN_A_CORE
Called by RN for patient with fever 101.2F  - as per chart review, this is a new fever  - will check cbc, cmp, lactate, blood cx, ua, urine cx, cxr Called by RN for patient with fever 101.2F  - as per chart review, this is a new fever  - patient seen at bedside; wife also present  - patient is aox1, did not allow me to examine him. pulled off my stethoscope as I attempted to auscultate his heart and lungs. as per nursing staff patient was hitting and swinging at other staff as well  - I explained to wife that since this is a new fever we must draw some blood tests, check a UA and CXR. she would like to allow her  to sleep more and wait until later this morning when patient may be more cooperative. she did add that he was complaining of dysuria before coming to the hospital however recent UA noted to be normal aside from glucosuria  - will check cbc, cmp, lactate, blood cx, ua, urine cx, cxr when patient is agreeable  - for now will give ofirmev as patient refused PO tylenol Called by RN for patient with fever 101.2F  - as per chart review, this is a new fever  - patient seen at bedside; wife also present  - patient is aox1, did not allow me to examine him. pulled off my stethoscope as I attempted to auscultate his heart and lungs. as per nursing staff patient was hitting and swinging at other staff as well  - patient is calm and sleeping comfortably when not stimulated/left alone   - I explained to wife that since this is a new fever we must draw some blood tests, check a UA and CXR. she would like to allow her  to sleep more and wait until later this morning when patient may be more cooperative. she did add that he was complaining of dysuria before coming to the hospital however recent UA noted to be normal aside from glucosuria  - will check cbc, cmp, lactate, blood cx, ua, urine cx, cxr when patient is agreeable  - for now will give ofirmev as patient refused PO tylenol

## 2024-02-24 LAB
ANION GAP SERPL CALC-SCNC: 6 MMOL/L — SIGNIFICANT CHANGE UP (ref 5–17)
BUN SERPL-MCNC: 20 MG/DL — SIGNIFICANT CHANGE UP (ref 7–23)
CALCIUM SERPL-MCNC: 8.3 MG/DL — LOW (ref 8.5–10.1)
CHLORIDE SERPL-SCNC: 105 MMOL/L — SIGNIFICANT CHANGE UP (ref 96–108)
CO2 SERPL-SCNC: 29 MMOL/L — SIGNIFICANT CHANGE UP (ref 22–31)
CREAT SERPL-MCNC: 0.53 MG/DL — SIGNIFICANT CHANGE UP (ref 0.5–1.3)
EGFR: 95 ML/MIN/1.73M2 — SIGNIFICANT CHANGE UP
GLUCOSE SERPL-MCNC: 177 MG/DL — HIGH (ref 70–99)
HCT VFR BLD CALC: 30.1 % — LOW (ref 39–50)
HGB BLD-MCNC: 10.4 G/DL — LOW (ref 13–17)
MAGNESIUM SERPL-MCNC: 1.9 MG/DL — SIGNIFICANT CHANGE UP (ref 1.6–2.6)
MCHC RBC-ENTMCNC: 34.6 GM/DL — SIGNIFICANT CHANGE UP (ref 32–36)
MCHC RBC-ENTMCNC: 37 PG — HIGH (ref 27–34)
MCV RBC AUTO: 107.1 FL — HIGH (ref 80–100)
NRBC # BLD: 0 /100 WBCS — SIGNIFICANT CHANGE UP (ref 0–0)
PLATELET # BLD AUTO: 313 K/UL — SIGNIFICANT CHANGE UP (ref 150–400)
POTASSIUM SERPL-MCNC: 4.1 MMOL/L — SIGNIFICANT CHANGE UP (ref 3.5–5.3)
POTASSIUM SERPL-SCNC: 4.1 MMOL/L — SIGNIFICANT CHANGE UP (ref 3.5–5.3)
RBC # BLD: 2.81 M/UL — LOW (ref 4.2–5.8)
RBC # FLD: 12.8 % — SIGNIFICANT CHANGE UP (ref 10.3–14.5)
SODIUM SERPL-SCNC: 140 MMOL/L — SIGNIFICANT CHANGE UP (ref 135–145)
WBC # BLD: 9.75 K/UL — SIGNIFICANT CHANGE UP (ref 3.8–10.5)
WBC # FLD AUTO: 9.75 K/UL — SIGNIFICANT CHANGE UP (ref 3.8–10.5)

## 2024-02-24 RX ORDER — TRAMADOL HYDROCHLORIDE 50 MG/1
25 TABLET ORAL ONCE
Refills: 0 | Status: DISCONTINUED | OUTPATIENT
Start: 2024-02-24 | End: 2024-02-24

## 2024-02-24 RX ADMIN — ENOXAPARIN SODIUM 40 MILLIGRAM(S): 100 INJECTION SUBCUTANEOUS at 16:37

## 2024-02-24 RX ADMIN — TRAMADOL HYDROCHLORIDE 25 MILLIGRAM(S): 50 TABLET ORAL at 16:12

## 2024-02-24 RX ADMIN — AMLODIPINE BESYLATE 2.5 MILLIGRAM(S): 2.5 TABLET ORAL at 05:12

## 2024-02-24 RX ADMIN — ATORVASTATIN CALCIUM 40 MILLIGRAM(S): 80 TABLET, FILM COATED ORAL at 21:37

## 2024-02-24 RX ADMIN — TRAMADOL HYDROCHLORIDE 25 MILLIGRAM(S): 50 TABLET ORAL at 17:01

## 2024-02-24 RX ADMIN — Medication 3 MILLIGRAM(S): at 21:37

## 2024-02-25 LAB
HCT VFR BLD CALC: 31.1 % — LOW (ref 39–50)
HGB BLD-MCNC: 10.7 G/DL — LOW (ref 13–17)
MCHC RBC-ENTMCNC: 34.4 GM/DL — SIGNIFICANT CHANGE UP (ref 32–36)
MCHC RBC-ENTMCNC: 37.8 PG — HIGH (ref 27–34)
MCV RBC AUTO: 109.9 FL — HIGH (ref 80–100)
NRBC # BLD: 0 /100 WBCS — SIGNIFICANT CHANGE UP (ref 0–0)
PLATELET # BLD AUTO: 337 K/UL — SIGNIFICANT CHANGE UP (ref 150–400)
RBC # BLD: 2.83 M/UL — LOW (ref 4.2–5.8)
RBC # FLD: 13 % — SIGNIFICANT CHANGE UP (ref 10.3–14.5)
WBC # BLD: 9.32 K/UL — SIGNIFICANT CHANGE UP (ref 3.8–10.5)
WBC # FLD AUTO: 9.32 K/UL — SIGNIFICANT CHANGE UP (ref 3.8–10.5)

## 2024-02-25 RX ORDER — MAGNESIUM HYDROXIDE 400 MG/1
30 TABLET, CHEWABLE ORAL DAILY
Refills: 0 | Status: DISCONTINUED | OUTPATIENT
Start: 2024-02-25 | End: 2024-02-26

## 2024-02-25 RX ADMIN — AMLODIPINE BESYLATE 2.5 MILLIGRAM(S): 2.5 TABLET ORAL at 05:18

## 2024-02-25 RX ADMIN — ENOXAPARIN SODIUM 40 MILLIGRAM(S): 100 INJECTION SUBCUTANEOUS at 18:03

## 2024-02-25 RX ADMIN — ATORVASTATIN CALCIUM 40 MILLIGRAM(S): 80 TABLET, FILM COATED ORAL at 21:19

## 2024-02-25 NOTE — CHART NOTE - NSCHARTNOTEFT_GEN_A_CORE
Do you have Advance Directives (HCP / LV / Organ donation / Documentation of oral advance Directive):   (  x  )  yes    (      )    NO                                                                            Do you have LV - Living will :                                                                                                                                             (   x )  yes    (      )   No    Do you have HCP - Health Care Proxy:                                                                                                                            (    x )  yes   (       ) N0    Do you have DNR- Do Not Resuscitate :                                                                                                                           (   x   )  yes  (        )  No    Do you have DNI- Do Not intubate  :                                                                                                                               (  x    )  yes   (       ) No    Do you have MOLST - Medical orders for Life sustaining treatment  :                                                                    (    x  ) yes    (       ) No    Decision Maker :  (     ) Patient     (    x  )  [facility]   (     ) Public Health Law Surrogate     (      ) Surrogate  (       ) Guardian    Goals of Care :  (      )   Complete Care     (       ) No Limitations                              (       )   Comfort Care       (       )  Hospice                               (   x   )   Limited medical Intervention / s    Medical Interventions :   (        )   CPR       (    x    )  DNR                                               (        )  Intubation with MV - Mechanical Ventilation  (      ) BIPAP/CPAP    (     x    )   DNI                                               (         )  Artificial Nutrition -  IVF, TPN / PPN, Tube Feeds             (     x    )   No Feeding Tube                                                (    x    ) Use Antibiotics                         (          ) No Antibiotics                                                (      x   ) Blood and Blood Products     (         )   No Blood or Blood products                                                (      x    )  Dialysis                                    (         )  No Dialysis                                                (          )  Medical Management only  (         )  No Invasive Interventions or Surgery  Time spent :                        (    x   ) upto 30 minutes                       (           )   more than 30 minutes  ACP reviewed and discussed

## 2024-02-26 ENCOUNTER — TRANSCRIPTION ENCOUNTER (OUTPATIENT)
Age: 89
End: 2024-02-26

## 2024-02-26 VITALS
RESPIRATION RATE: 18 BRPM | OXYGEN SATURATION: 93 % | HEART RATE: 90 BPM | DIASTOLIC BLOOD PRESSURE: 58 MMHG | SYSTOLIC BLOOD PRESSURE: 118 MMHG | TEMPERATURE: 98 F

## 2024-02-26 LAB
HCT VFR BLD CALC: 29.3 % — LOW (ref 39–50)
HGB BLD-MCNC: 10 G/DL — LOW (ref 13–17)
MCHC RBC-ENTMCNC: 34.1 GM/DL — SIGNIFICANT CHANGE UP (ref 32–36)
MCHC RBC-ENTMCNC: 37.3 PG — HIGH (ref 27–34)
MCV RBC AUTO: 109.3 FL — HIGH (ref 80–100)
NRBC # BLD: 0 /100 WBCS — SIGNIFICANT CHANGE UP (ref 0–0)
PLATELET # BLD AUTO: 392 K/UL — SIGNIFICANT CHANGE UP (ref 150–400)
RBC # BLD: 2.68 M/UL — LOW (ref 4.2–5.8)
RBC # FLD: 12.9 % — SIGNIFICANT CHANGE UP (ref 10.3–14.5)
WBC # BLD: 9.2 K/UL — SIGNIFICANT CHANGE UP (ref 3.8–10.5)
WBC # FLD AUTO: 9.2 K/UL — SIGNIFICANT CHANGE UP (ref 3.8–10.5)

## 2024-02-26 PROCEDURE — 84145 PROCALCITONIN (PCT): CPT

## 2024-02-26 PROCEDURE — 96374 THER/PROPH/DIAG INJ IV PUSH: CPT

## 2024-02-26 PROCEDURE — 73200 CT UPPER EXTREMITY W/O DYE: CPT | Mod: MC

## 2024-02-26 PROCEDURE — 70450 CT HEAD/BRAIN W/O DYE: CPT | Mod: MG

## 2024-02-26 PROCEDURE — 73060 X-RAY EXAM OF HUMERUS: CPT

## 2024-02-26 PROCEDURE — 97530 THERAPEUTIC ACTIVITIES: CPT

## 2024-02-26 PROCEDURE — 80048 BASIC METABOLIC PNL TOTAL CA: CPT

## 2024-02-26 PROCEDURE — 81003 URINALYSIS AUTO W/O SCOPE: CPT

## 2024-02-26 PROCEDURE — 97110 THERAPEUTIC EXERCISES: CPT

## 2024-02-26 PROCEDURE — 76376 3D RENDER W/INTRP POSTPROCES: CPT

## 2024-02-26 PROCEDURE — 87086 URINE CULTURE/COLONY COUNT: CPT

## 2024-02-26 PROCEDURE — 83735 ASSAY OF MAGNESIUM: CPT

## 2024-02-26 PROCEDURE — 99285 EMERGENCY DEPT VISIT HI MDM: CPT | Mod: 25

## 2024-02-26 PROCEDURE — 80053 COMPREHEN METABOLIC PANEL: CPT

## 2024-02-26 PROCEDURE — 97162 PT EVAL MOD COMPLEX 30 MIN: CPT

## 2024-02-26 PROCEDURE — 71045 X-RAY EXAM CHEST 1 VIEW: CPT

## 2024-02-26 PROCEDURE — 73030 X-RAY EXAM OF SHOULDER: CPT

## 2024-02-26 PROCEDURE — 85027 COMPLETE CBC AUTOMATED: CPT

## 2024-02-26 PROCEDURE — G1004: CPT

## 2024-02-26 PROCEDURE — 83605 ASSAY OF LACTIC ACID: CPT

## 2024-02-26 PROCEDURE — 87040 BLOOD CULTURE FOR BACTERIA: CPT

## 2024-02-26 PROCEDURE — 36415 COLL VENOUS BLD VENIPUNCTURE: CPT

## 2024-02-26 PROCEDURE — 0225U NFCT DS DNA&RNA 21 SARSCOV2: CPT

## 2024-02-26 PROCEDURE — 85025 COMPLETE CBC W/AUTO DIFF WBC: CPT

## 2024-02-26 PROCEDURE — 72125 CT NECK SPINE W/O DYE: CPT | Mod: MG

## 2024-02-26 PROCEDURE — 97166 OT EVAL MOD COMPLEX 45 MIN: CPT

## 2024-02-26 RX ORDER — ACETAMINOPHEN 500 MG
2 TABLET ORAL
Qty: 0 | Refills: 0 | DISCHARGE
Start: 2024-02-26

## 2024-02-26 RX ORDER — TRAMADOL HYDROCHLORIDE 50 MG/1
1 TABLET ORAL
Qty: 0 | Refills: 0 | DISCHARGE
Start: 2024-02-26

## 2024-02-26 RX ORDER — ENOXAPARIN SODIUM 100 MG/ML
40 INJECTION SUBCUTANEOUS
Qty: 0 | Refills: 0 | DISCHARGE
Start: 2024-02-26

## 2024-02-26 RX ORDER — TRAMADOL HYDROCHLORIDE 50 MG/1
0.5 TABLET ORAL
Qty: 0 | Refills: 0 | DISCHARGE
Start: 2024-02-26

## 2024-02-26 RX ADMIN — AMLODIPINE BESYLATE 2.5 MILLIGRAM(S): 2.5 TABLET ORAL at 05:27

## 2024-02-26 NOTE — SOCIAL WORK PROGRESS NOTE - NSSWPROGRESSNOTE_GEN_ALL_CORE
CORAZON met with pt spouse Joanne at bedside to discuss [facility] choices. Pt spouse Joanne wishes for referral to be sent to [facility]. SW sent referral, educated spouse about additional choices. Per MD, pt is cleared for dc today. CORAZON is awaiting response from [facility]. CORAZON will continue to follow.

## 2024-02-26 NOTE — PROGRESS NOTE ADULT - PROBLEM SELECTOR PROBLEM 3
I have reviewed discharge instructions with the son. The son verbalized understanding. Patient left ED via Discharge Method: wheelchair to [address] with son. Opportunity for questions and clarification provided. Patient given 0 scripts. To continue your aftercare when you leave the hospital, you may receive an automated call from our care team to check in on how you are doing. This is a free service and part of our promise to provide the best care and service to meet your aftercare needs.  If you have questions, or wish to unsubscribe from this service please call [phone]. Thank you for Choosing our [facility] Emergency Department.        Zahra Nowak RN  08/28/22 3129
Provided report to Wesley Angulo at [facility].  Pt is prepared for discharge and I will be contacting pts son to  pt per the facility's request.      Na Love RN  08/28/22 3961
Essential hypertension

## 2024-02-26 NOTE — PROGRESS NOTE ADULT - PROBLEM SELECTOR PROBLEM 4
Dislocation of shoulder, left, closed

## 2024-02-26 NOTE — DISCHARGE NOTE PROVIDER - NSDCMRMEDTOKEN_GEN_ALL_CORE_FT
acetaminophen 325 mg oral tablet: 2 tab(s) orally every 6 hours As needed Temp greater or equal to 38C (100.4F), Mild Pain (1 - 3)  amLODIPine 2.5 mg oral tablet: 1 tab(s) orally once a day (at bedtime)  enoxaparin: 40 milligram(s) subcutaneous once a day  fluvastatin 80 mg oral tablet, extended release: 1 tab(s) orally once a day  traMADol 50 mg oral tablet: 0.5 tab(s) orally every 6 hours As needed Moderate Pain (4 - 6)  traMADol 50 mg oral tablet: 1 tab(s) orally every 6 hours As needed Severe Pain (7 - 10)

## 2024-02-26 NOTE — PROGRESS NOTE ADULT - PROBLEM SELECTOR PLAN 4
Seen by ortho  Should reduced  NWB  Pain meds as needed   PT  Ortho f/u
Seen by ortho  Should reduced  NWB  Pain meds as needed   PT  Ortho f/u  OT
Seen by ortho  Should reduced  NWB  Pain meds as needed   PT  Ortho f/u  OT
Seen by ortho  Should reduced  NWB  Pain meds as needed   PT  Ortho f/u

## 2024-02-26 NOTE — DISCHARGE NOTE PROVIDER - CARE PROVIDER_API CALL
Willis Londono  Internal Medicine  [address], [address]  [place] [address]  Phone: [phone]  Fax: [phone]  Established Patient  Follow Up Time: 1 week    Tristen Montoya  Orthopedics  Phone: ()-  Fax: ()-  Follow Up Time: 1 week

## 2024-02-26 NOTE — PROGRESS NOTE ADULT - SUBJECTIVE AND OBJECTIVE BOX
Neurology Follow up note    DUSTIN [id number]    HPI:  89 M with dementia HLD BIBEMS from home, accompanied by wife, due to fall. Wife states she found him on the floor next to the bed. States recently he has been more unsteady and tired than usual, concerned about infection. Currently at baseline mental status. Per EMS they noted pt has pain to shoulder, wife states it is bruised and swollen. Patient has had multiple falls this week. Patient walks independently in the house.  (22 Feb 2024 15:52)      Interval History -doing better    Patient is seen, chart was reviewed and case was discussed with the treatment team.  Pt is not in any distress.   Lying on bed comfortably.       Vital Signs Last 24 Hrs  T(C): 36.9 (26 Feb 2024 11:56), Max: 36.9 (26 Feb 2024 11:56)  T(F): 98.4 (26 Feb 2024 11:56), Max: 98.4 (26 Feb 2024 11:56)  HR: 90 (26 Feb 2024 11:56) (75 - 90)  BP: 118/58 (26 Feb 2024 11:56) (118/58 - 147/68)  BP(mean): --  RR: 18 (26 Feb 2024 11:56) (17 - 18)  SpO2: 93% (26 Feb 2024 11:56) (93% - 94%)    Parameters below as of 26 Feb 2024 11:56  Patient On (Oxygen Delivery Method): room air            REVIEW OF SYSTEMS:    Constitutional: No fever,  Eyes: No eye pain, vi or discharge  ENT:  No di tinnitus, vertigo; No sinus or throat pain  Neck: No pain or stiffness  Respiratory: No cough, wheezing, chills or hemoptysis  Cardiovascular: No chest pain, palpitations, shortness of breath, dizzines  Gastrointestinal: No abdominal or epigastric pain. No nausea, vomiting or hematemesi  Genitourinary: No dysuria, frequency, hematuria or incontinence  Neurological: No headaches, , numbness or tremors  Psychiatric: No depression, anxiety, mood swings or difficulty sleeping  Musculoskeletal: No joint pain or swelling; No muscle, back or extremity pain  Skin: No itching, burning, rashes or lesions   Lymph Nodes: No enlarged glands  Endocrine: No heat or cold intolerance; No hair loss,  Allergy and Immunologic: No hives or eczema    On Neurological Examination:    Mental Status - Pt is alert, awake,. Follows commands well and able to answer questions appropriately.Mood and affect  normal    Speech -  Normal.     Cranial Nerves - Pupils 3 mm equal and reactive to light, extraocular eye movements intact. Pt has no visual field deficit.  Pt has no facial asymmetry. Facial sensation is intact.Tongue - is in midline.    Muscle tone - is keith    Motor Exam - 4+/5 all over, No drift. No shaking or tremors.    Sensory Exam Pt withdraws all extremities equally on stimulation. No asymmetry seen. No complaints of tingling, numbness.        coordination:    Finger to nose: normal    Deep tendon Reflexes - 2 plus all over    Neck Supple -  Yes.     MEDICATIONS    acetaminophen     Tablet .. 650 milliGRAM(s) Oral every 6 hours PRN  aluminum hydroxide/magnesium hydroxide/simethicone Suspension 30 milliLiter(s) Oral every 4 hours PRN  amLODIPine   Tablet 2.5 milliGRAM(s) Oral daily  atorvastatin 40 milliGRAM(s) Oral at bedtime  enoxaparin Injectable 40 milliGRAM(s) SubCutaneous every 24 hours  magnesium hydroxide Suspension 30 milliLiter(s) Oral daily PRN  melatonin 3 milliGRAM(s) Oral at bedtime PRN  ondansetron Injectable 4 milliGRAM(s) IV Push every 6 hours PRN  traMADol 25 milliGRAM(s) Oral every 6 hours PRN  traMADol 50 milliGRAM(s) Oral every 6 hours PRN      Allergies    enalapril (Unknown)  amoxicillin (Unknown)    Intolerances        LABS:  CBC Full  -  ( 26 Feb 2024 08:32 )  WBC Count : 9.20 K/uL  RBC Count : 2.68 M/uL  Hemoglobin : 10.0 g/dL  Hematocrit : 29.3 %  Platelet Count - Automated : 392 K/uL  Mean Cell Volume : 109.3 fl  Mean Cell Hemoglobin : 37.3 pg  Mean Cell Hemoglobin Concentration : 34.1 gm/dL  Auto Neutrophil # : x              Hemoglobin A1C:     Vitamin B12     RADIOLOGY    ASSESSMENT AND PLAN:      seen for fall, likely mechanical  PN  LR    DC to [facility]  Physical therapy evaluation.  Pain is accessed and addressed.    Plan of care was discussed with family(WIFE) at bed side  Questions answered.              
Date of Service: 02-23-24 @ 11:56    Patient is a 89y old  Male who presents with a chief complaint of fall (22 Feb 2024 15:52)      INTERVAL HPI/OVERNIGHT EVENTS: Patient seen and examined. NAD. No complaints. Pleasantly confused.     Vital Signs Last 24 Hrs  T(C): 36.9 (23 Feb 2024 08:01), Max: 38.4 (23 Feb 2024 05:24)  T(F): 98.5 (23 Feb 2024 08:01), Max: 101.2 (23 Feb 2024 05:24)  HR: 78 (23 Feb 2024 05:24) (69 - 80)  BP: 134/60 (23 Feb 2024 05:24) (108/49 - 136/65)  BP(mean): --  RR: 19 (23 Feb 2024 05:24) (16 - 19)  SpO2: 92% (23 Feb 2024 05:24) (92% - 99%)    Parameters below as of 22 Feb 2024 12:25  Patient On (Oxygen Delivery Method): room air        02-23    143  |  106  |  25<H>  ----------------------------<  138<H>  5.5<H>   |  31  |  0.82    Ca    8.9      23 Feb 2024 06:00  Mg     2.0     02-23    TPro  6.2  /  Alb  3.4  /  TBili  1.0  /  DBili  x   /  AST  33  /  ALT  25  /  AlkPhos  112  02-22                          11.0   10.59 )-----------( 350      ( 23 Feb 2024 06:00 )             32.0       CAPILLARY BLOOD GLUCOSE        Urinalysis Basic - ( 23 Feb 2024 06:00 )    Color: x / Appearance: x / SG: x / pH: x  Gluc: 138 mg/dL / Ketone: x  / Bili: x / Urobili: x   Blood: x / Protein: x / Nitrite: x   Leuk Esterase: x / RBC: x / WBC x   Sq Epi: x / Non Sq Epi: x / Bacteria: x              acetaminophen     Tablet .. 650 milliGRAM(s) Oral every 6 hours PRN  aluminum hydroxide/magnesium hydroxide/simethicone Suspension 30 milliLiter(s) Oral every 4 hours PRN  amLODIPine   Tablet 2.5 milliGRAM(s) Oral daily  atorvastatin 40 milliGRAM(s) Oral at bedtime  enoxaparin Injectable 40 milliGRAM(s) SubCutaneous every 24 hours  melatonin 3 milliGRAM(s) Oral at bedtime PRN  ondansetron Injectable 4 milliGRAM(s) IV Push every 6 hours PRN  sodium chloride 0.9%. 1000 milliLiter(s) IV Continuous <Continuous>  traMADol 25 milliGRAM(s) Oral every 6 hours PRN  traMADol 50 milliGRAM(s) Oral every 6 hours PRN              REVIEW OF SYSTEMS:  CONSTITUTIONAL: + fever, no weight loss, or no fatigue  NECK: No pain, no stiffness  RESPIRATORY: No cough, no wheezing, no chills, no hemoptysis, No shortness of breath  CARDIOVASCULAR: No chest pain, no palpitations, no dizziness, no leg swelling  GASTROINTESTINAL: No abdominal pain. No nausea, no vomiting, no hematemesis; No diarrhea, no constipation. No melena, no hematochezia.  GENITOURINARY: No dysuria, no frequency, no hematuria, no incontinence  NEUROLOGICAL: No headaches, no loss of strength, no numbness, no tremors  SKIN: No itching, no burning  MUSCULOSKELETAL: No joint pain, no swelling; No muscle, no back, no extremity pain  PSYCHIATRIC: No depression, no mood swings,   HEME/LYMPH: No easy bruising, no bleeding gums  ALLERY AND IMMUNOLOGIC: No hives       Consultant(s) Notes Reviewed:  [X] YES  [ ] NO    PHYSICAL EXAM:  GENERAL: NAD  HEAD:  Atraumatic, Normocephalic  EYES: EOMI, PERRLA, conjunctiva and sclera clear  ENMT: No tonsillar erythema, exudates, or enlargement; Moist mucous membranes  NECK: Supple, No JVD  NERVOUS SYSTEM:  Awake & alert  CHEST/LUNG: Clear to auscultation bilaterally; No rales, rhonchi, wheezing,  HEART: Regular rate and rhythm  ABDOMEN: Soft, Nontender, Nondistended; Bowel sounds present  EXTREMITIES:  No clubbing, cyanosis, or edema  LYMPH: No lymphadenopathy noted  SKIN: No rashes      Advanced care planning discussed with patient/family [X] YES   [ ] NO    Advanced care planning discussed with patient/family. Patient's health status was discussed. All appropriate changes have been made regarding patient's end-of-life care. Advanced care planning forms reviewed/discussed/completed.  20 minutes spent.   
Date of Service 02-24-24 @ 13:39    Patient is a 89y old  Male who presents with a chief complaint of fall (23 Feb 2024 15:32)      INTERVAL /OVERNIGHT EVENTS: no new events    MEDICATIONS  (STANDING):  amLODIPine   Tablet 2.5 milliGRAM(s) Oral daily  atorvastatin 40 milliGRAM(s) Oral at bedtime  enoxaparin Injectable 40 milliGRAM(s) SubCutaneous every 24 hours    MEDICATIONS  (PRN):  acetaminophen     Tablet .. 650 milliGRAM(s) Oral every 6 hours PRN Temp greater or equal to 38C (100.4F), Mild Pain (1 - 3)  aluminum hydroxide/magnesium hydroxide/simethicone Suspension 30 milliLiter(s) Oral every 4 hours PRN Dyspepsia  melatonin 3 milliGRAM(s) Oral at bedtime PRN Insomnia  ondansetron Injectable 4 milliGRAM(s) IV Push every 6 hours PRN Nausea and/or Vomiting  traMADol 25 milliGRAM(s) Oral every 6 hours PRN Moderate Pain (4 - 6)  traMADol 50 milliGRAM(s) Oral every 6 hours PRN Severe Pain (7 - 10)      Allergies    enalapril (Unknown)  amoxicillin (Unknown)    Intolerances        REVIEW OF SYSTEMS:  unable to obtain    Vital Signs Last 24 Hrs  T(C): 36.7 (24 Feb 2024 11:32), Max: 37 (23 Feb 2024 19:33)  T(F): 98 (24 Feb 2024 11:32), Max: 98.6 (23 Feb 2024 19:33)  HR: 75 (24 Feb 2024 12:45) (71 - 80)  BP: 131/56 (24 Feb 2024 12:45) (124/57 - 161/71)  BP(mean): --  RR: 18 (24 Feb 2024 12:45) (18 - 19)  SpO2: 93% (24 Feb 2024 12:45) (93% - 95%)    Parameters below as of 24 Feb 2024 12:45  Patient On (Oxygen Delivery Method): room air        PHYSICAL EXAM:  GENERAL: NAD, well-groomed, well-developed  HEAD:  Atraumatic, Normocephalic  EYES: EOMI, PERRLA, conjunctiva and sclera clear  ENMT: No tonsillar erythema, exudates, or enlargement; Moist mucous membranes, Good dentition, No lesions  NECK: Supple, No JVD, Normal thyroid  NERVOUS SYSTEM:  Arousable; Motor Strength 5/5 B/L upper and lower extremities; DTRs 2+ intact and symmetric  CHEST/LUNG: Clear to auscultation bilaterally; No rales, rhonchi, wheezing, or rubs  HEART: Regular rate and rhythm; No murmurs, rubs, or gallops  ABDOMEN: Soft, Nontender, Nondistended; Bowel sounds present  EXTREMITIES:  2+ Peripheral Pulses, No clubbing, cyanosis, or edema  LYMPH: No lymphadenopathy noted  SKIN: No rashes or lesions    LABS:                        10.4   9.75  )-----------( 313      ( 24 Feb 2024 08:35 )             30.1     24 Feb 2024 08:35    140    |  105    |  20     ----------------------------<  177    4.1     |  29     |  0.53     Ca    8.3        24 Feb 2024 08:35  Mg     1.9       24 Feb 2024 08:35        Urinalysis Basic - ( 24 Feb 2024 08:35 )    Color: x / Appearance: x / SG: x / pH: x  Gluc: 177 mg/dL / Ketone: x  / Bili: x / Urobili: x   Blood: x / Protein: x / Nitrite: x   Leuk Esterase: x / RBC: x / WBC x   Sq Epi: x / Non Sq Epi: x / Bacteria: x      CAPILLARY BLOOD GLUCOSE          RADIOLOGY & ADDITIONAL TESTS:    Notes Reviewed:  [x ] YES  [ ] NO    Care Discussed with Consultants/Other Providers [x ] YES  [ ] NO
Date of Service: 02-26-24 @ 11:34    Patient is a 89y old  Male who presents with a chief complaint of fall (26 Feb 2024 08:10)      INTERVAL HPI/OVERNIGHT EVENTS: Patient seen and examined. NAD. No complaints.    Vital Signs Last 24 Hrs  T(C): 36.7 (26 Feb 2024 05:03), Max: 36.7 (26 Feb 2024 05:03)  T(F): 98 (26 Feb 2024 05:03), Max: 98 (26 Feb 2024 05:03)  HR: 75 (26 Feb 2024 05:03) (74 - 81)  BP: 120/62 (26 Feb 2024 05:03) (109/58 - 147/68)  BP(mean): --  RR: 17 (26 Feb 2024 05:03) (17 - 17)  SpO2: 94% (26 Feb 2024 05:03) (93% - 94%)    Parameters below as of 26 Feb 2024 05:03  Patient On (Oxygen Delivery Method): room air                                    10.0   9.20  )-----------( 392      ( 26 Feb 2024 08:32 )             29.3       CAPILLARY BLOOD GLUCOSE                  acetaminophen     Tablet .. 650 milliGRAM(s) Oral every 6 hours PRN  aluminum hydroxide/magnesium hydroxide/simethicone Suspension 30 milliLiter(s) Oral every 4 hours PRN  amLODIPine   Tablet 2.5 milliGRAM(s) Oral daily  atorvastatin 40 milliGRAM(s) Oral at bedtime  enoxaparin Injectable 40 milliGRAM(s) SubCutaneous every 24 hours  magnesium hydroxide Suspension 30 milliLiter(s) Oral daily PRN  melatonin 3 milliGRAM(s) Oral at bedtime PRN  ondansetron Injectable 4 milliGRAM(s) IV Push every 6 hours PRN  traMADol 25 milliGRAM(s) Oral every 6 hours PRN  traMADol 50 milliGRAM(s) Oral every 6 hours PRN              REVIEW OF SYSTEMS:  CONSTITUTIONAL: No fever, no weight loss, or no fatigue  NECK: No pain, no stiffness  RESPIRATORY: No cough, no wheezing, no chills, no hemoptysis, No shortness of breath  CARDIOVASCULAR: No chest pain, no palpitations, no dizziness, no leg swelling  GASTROINTESTINAL: No abdominal pain. No nausea, no vomiting, no hematemesis; No diarrhea, no constipation. No melena, no hematochezia.  GENITOURINARY: No dysuria, no frequency, no hematuria, no incontinence  NEUROLOGICAL: No headaches, no loss of strength, no numbness, no tremors  SKIN: No itching, no burning  MUSCULOSKELETAL: No joint pain, no swelling; No muscle, no back, no extremity pain  PSYCHIATRIC: No depression, no mood swings,   HEME/LYMPH: No easy bruising, no bleeding gums  ALLERY AND IMMUNOLOGIC: No hives       Consultant(s) Notes Reviewed:  [X] YES  [ ] NO    PHYSICAL EXAM:  GENERAL: NAD  HEAD:  Atraumatic, Normocephalic  EYES: EOMI, PERRLA, conjunctiva and sclera clear  ENMT: No tonsillar erythema, exudates, or enlargement; Moist mucous membranes  NECK: Supple, No JVD  NERVOUS SYSTEM:  Awake & alert  CHEST/LUNG: Clear to auscultation bilaterally; No rales, rhonchi, wheezing,  HEART: Regular rate and rhythm  ABDOMEN: Soft, Nontender, Nondistended; Bowel sounds present  EXTREMITIES:  No clubbing, cyanosis, or edema  LYMPH: No lymphadenopathy noted  SKIN: No rashes      Advanced care planning discussed with patient/family [X] YES   [ ] NO    Advanced care planning discussed with patient/family. Patient's health status was discussed. All appropriate changes have been made regarding patient's end-of-life care. Advanced care planning forms reviewed/discussed/completed.  20 minutes spent.   
Date of Service 02-25-24 @ 16:13    Patient is a 89y old  Male who presents with a chief complaint of fall (24 Feb 2024 11:38)      INTERVAL /OVERNIGHT EVENTS: alert awake confused    MEDICATIONS  (STANDING):  amLODIPine   Tablet 2.5 milliGRAM(s) Oral daily  atorvastatin 40 milliGRAM(s) Oral at bedtime  enoxaparin Injectable 40 milliGRAM(s) SubCutaneous every 24 hours    MEDICATIONS  (PRN):  acetaminophen     Tablet .. 650 milliGRAM(s) Oral every 6 hours PRN Temp greater or equal to 38C (100.4F), Mild Pain (1 - 3)  aluminum hydroxide/magnesium hydroxide/simethicone Suspension 30 milliLiter(s) Oral every 4 hours PRN Dyspepsia  melatonin 3 milliGRAM(s) Oral at bedtime PRN Insomnia  ondansetron Injectable 4 milliGRAM(s) IV Push every 6 hours PRN Nausea and/or Vomiting  traMADol 25 milliGRAM(s) Oral every 6 hours PRN Moderate Pain (4 - 6)  traMADol 50 milliGRAM(s) Oral every 6 hours PRN Severe Pain (7 - 10)      Allergies    enalapril (Unknown)  amoxicillin (Unknown)    Intolerances        REVIEW OF SYSTEMS:  unable to obtain    Vital Signs Last 24 Hrs  T(C): 36.3 (25 Feb 2024 12:21), Max: 36.7 (25 Feb 2024 04:50)  T(F): 97.4 (25 Feb 2024 12:21), Max: 98 (25 Feb 2024 04:50)  HR: 74 (25 Feb 2024 12:21) (62 - 74)  BP: 109/58 (25 Feb 2024 12:21) (109/58 - 110/46)  BP(mean): --  RR: 17 (25 Feb 2024 12:21) (17 - 17)  SpO2: 93% (25 Feb 2024 12:21) (92% - 95%)    Parameters below as of 25 Feb 2024 12:21  Patient On (Oxygen Delivery Method): room air        PHYSICAL EXAM:  GENERAL: NAD, well-groomed, well-developed  HEAD:  Atraumatic, Normocephalic  EYES: EOMI, PERRLA, conjunctiva and sclera clear  ENMT: No tonsillar erythema, exudates, or enlargement; Moist mucous membranes, Good dentition, No lesions  NECK: Supple, No JVD, Normal thyroid  NERVOUS SYSTEM:  Alert & awake, Good concentration; Motor Strength 5/5 B/L upper and lower extremities; DTRs 2+ intact and symmetric  CHEST/LUNG: Clear to auscultation bilaterally; No rales, rhonchi, wheezing, or rubs  HEART: Regular rate and rhythm; No murmurs, rubs, or gallops  ABDOMEN: Soft, Nontender, Nondistended; Bowel sounds present  EXTREMITIES:  2+ Peripheral Pulses, No clubbing, cyanosis, or edema  LYMPH: No lymphadenopathy noted  SKIN: No rashes or lesions    LABS:                        10.7   9.32  )-----------( 337      ( 25 Feb 2024 11:40 )             31.1       Ca    8.3        24 Feb 2024 08:35        Urinalysis Basic - ( 24 Feb 2024 08:35 )    Color: x / Appearance: x / SG: x / pH: x  Gluc: 177 mg/dL / Ketone: x  / Bili: x / Urobili: x   Blood: x / Protein: x / Nitrite: x   Leuk Esterase: x / RBC: x / WBC x   Sq Epi: x / Non Sq Epi: x / Bacteria: x      CAPILLARY BLOOD GLUCOSE          RADIOLOGY & ADDITIONAL TESTS:    Notes Reviewed:  [x ] YES  [ ] NO    Care Discussed with Consultants/Other Providers [x ] YES  [ ] NO

## 2024-02-26 NOTE — DISCHARGE NOTE NURSING/CASE MANAGEMENT/SOCIAL WORK - PATIENT PORTAL LINK FT
You can access the FollowMyHealth Patient Portal offered by [facility] by registering at the following website: [email or web address]. By joining [facility]’s FollowMyHealth portal, you will also be able to view your health information using other applications (apps) compatible with our system.

## 2024-02-26 NOTE — DISCHARGE NOTE PROVIDER - PROVIDER TOKENS
PROVIDER:[TOKEN:[[id number]],FOLLOWUP:[1 week],ESTABLISHEDPATIENT:[T]],PROVIDER:[TOKEN:[[id number]],FOLLOWUP:[1 week]]

## 2024-02-26 NOTE — PROGRESS NOTE ADULT - PROBLEM SELECTOR PLAN 2
Unclear etiology  Pan-cultured  CXR clear (unofficial)  Monitor labs  Defer abx until seen by ID  ID consult  Further work-up/management pending clinical course.
Unclear etiology  Pan-cultured  CXR clear (unofficial)  Monitor labs  Defer abx until seen by ID  ID consult  Further work-up/management pending clinical course.
No further fevers  Work-up negative  Cultures NTD
Unclear etiology  Pan-cultured  CXR clear (unofficial)  Monitor labs  Defer abx until seen by ID  ID consult with dr. merrill  Further work-up/management pending clinical course.

## 2024-02-26 NOTE — DISCHARGE NOTE PROVIDER - NSDCCPCAREPLAN_GEN_ALL_CORE_FT
PRINCIPAL DISCHARGE DIAGNOSIS  Diagnosis: Multiple falls  Assessment and Plan of Treatment: PT  Follow-up with your primary care doctor within 1 week.        SECONDARY DISCHARGE DIAGNOSES  Diagnosis: Closed fracture of anatomical neck of left humerus  Assessment and Plan of Treatment: follow-up with ortho in one week

## 2024-02-26 NOTE — DISCHARGE NOTE NURSING/CASE MANAGEMENT/SOCIAL WORK - NSDCPEFALRISK_GEN_ALL_CORE
For information on Fall & Injury Prevention, visit: [email or web address] OR  [email or web address] OR  https://www.cdc.gov/steadi/patient.html

## 2024-02-26 NOTE — PROGRESS NOTE ADULT - PROBLEM SELECTOR PLAN 1
Multiple falls over the past week  Fall precautions  PT  Neuro consult with dr. morrissey  Will need MANDY
Multiple falls over the past week  Fall precautions  PT  Neuro consult with dr. morrissey  Will need MANDY
Multiple falls over the past week  Fall precautions  PT  Neuro consult  Will need MANDY
Multiple falls over the past week  Fall precautions  PT  Neuro consult  Will need MANDY

## 2024-02-26 NOTE — DISCHARGE NOTE PROVIDER - HOSPITAL COURSE
89 M with dementia HLD BIBEMS from home, accompanied by wife, due to fall. Wife states she found him on the floor next to the bed. States recently he has been more unsteady and tired than usual, concerned about infection. Currently at baseline mental status. Per EMS they noted pt has pain to shoulder, wife states it is bruised and swollen. Patient has had multiple falls this week. Patient walks independently in the house.     Patient dislocated his L shoulder which was reduced by ortho  Had fever x 1 -- work-up negative  Going to [facility]    >35 minutes spent on discharge

## 2024-02-28 LAB
CULTURE RESULTS: SIGNIFICANT CHANGE UP
CULTURE RESULTS: SIGNIFICANT CHANGE UP
SPECIMEN SOURCE: SIGNIFICANT CHANGE UP
SPECIMEN SOURCE: SIGNIFICANT CHANGE UP

## 2024-03-21 NOTE — ED PROVIDER NOTE - NS ED MD DISPO ISOLATION TYPES
[FreeTextEntry1] : same metoprolol Stay on statin Continue same dose warfarin Hematology follow-up for P vera
None

## 2024-04-30 NOTE — GOALS OF CARE CONVERSATION - ADVANCED CARE PLANNING - CONVERSATION DETAILS
From: Felisa Falcon  To: Krystal Smith  Sent: 4/30/2024 11:10 AM CDT  Subject: Dymista RX?    Krystal,  I have been off of my Dymista nasal spray for a year. I’ve been sick a lot since January and I think I should get back on it. I took an antibiotic March 28th for a sinus infection, so I am afraid to do that again so close together. But I do feel like I have another sinus infection. Lots of pressure behind my teeth in my face. I spent time with my sister last week and she was diagnosed today with a sinus infection. Love to hear your thoughts on doing another round of augmentin and/or starting back on the Dymista to try to reduce the frequency. Since March 28 I have been using a daily nasal spray but it’s not a combo, just fluticasone. Happy to jump on a call with you so you can bill insurance vs free advice. Thanks again. Felisa  
See triage note  
Palliative care SW met with patient's wife at bedside to discuss advanced care planning. Reviewed patient's medical and social history as well as events leading to patient's hospitalization. Writer discussed patient's current diagnosis (repeated falls, dislocation of L shoulder; Dementia, Anxiety, HLD, HTN), medical condition and management. Patient has a HCP and Living Will on chart with wife Joanne as health care agent and son Benjamin as alternate agent. Inquired about patient's wishes regarding extent of medical care to be provided including thoughts regarding cardiopulmonary resuscitation and mechanical ventilation/intubation. Wife states that wishes are for DNR/DNI. Discussed MOLST form with patient's wife. MOLST completed and placed on chart. Wife showed insight into medical condition. All questions answered. Psychosocial support provided.

## 2024-05-02 NOTE — ED PROVIDER NOTE - ATTENDING APP SHARED VISIT CONTRIBUTION OF CARE
Li was seen and treated in our emergency department on 5/2/2024.  She may return to school on 05/06/2024.      If you have any questions or concerns, please don't hesitate to call.      Pam Tinsely MD
89yo male bib ems s/p fall with left shoulder pain, unknown LOC, pt c/o left shoulder pain, pt also has not been feeling well the last few days  exam: abd soft, +swelling and ecchymosis to left shoulder, neurovasc intact  plan: labs, xr, ct   agree with assessment and plan of PA

## 2024-05-04 ENCOUNTER — TRANSCRIPTION ENCOUNTER (OUTPATIENT)
Age: 89
End: 2024-05-04

## 2024-07-31 ENCOUNTER — INPATIENT (INPATIENT)
Facility: HOSPITAL | Age: 89
LOS: 5 days | Discharge: HOME | DRG: 178 | End: 2024-08-06
Attending: INTERNAL MEDICINE | Admitting: INTERNAL MEDICINE
Payer: MEDICARE

## 2024-07-31 VITALS
OXYGEN SATURATION: 96 % | DIASTOLIC BLOOD PRESSURE: 61 MMHG | HEIGHT: 69 IN | SYSTOLIC BLOOD PRESSURE: 136 MMHG | HEART RATE: 74 BPM | WEIGHT: 110.01 LBS | TEMPERATURE: 97 F | RESPIRATION RATE: 18 BRPM

## 2024-07-31 DIAGNOSIS — Z98.890 OTHER SPECIFIED POSTPROCEDURAL STATES: Chronic | ICD-10-CM

## 2024-07-31 DIAGNOSIS — J18.9 PNEUMONIA, UNSPECIFIED ORGANISM: ICD-10-CM

## 2024-07-31 DIAGNOSIS — I10 ESSENTIAL (PRIMARY) HYPERTENSION: ICD-10-CM

## 2024-07-31 DIAGNOSIS — E78.5 HYPERLIPIDEMIA, UNSPECIFIED: ICD-10-CM

## 2024-07-31 DIAGNOSIS — U07.1 COVID-19: ICD-10-CM

## 2024-07-31 DIAGNOSIS — F03.90 UNSPECIFIED DEMENTIA, UNSPECIFIED SEVERITY, WITHOUT BEHAVIORAL DISTURBANCE, PSYCHOTIC DISTURBANCE, MOOD DISTURBANCE, AND ANXIETY: ICD-10-CM

## 2024-07-31 LAB
ALBUMIN SERPL ELPH-MCNC: 2.7 G/DL — LOW (ref 3.3–5)
ALP SERPL-CCNC: 96 U/L — SIGNIFICANT CHANGE UP (ref 40–120)
ALT FLD-CCNC: 25 U/L — SIGNIFICANT CHANGE UP (ref 12–78)
ANION GAP SERPL CALC-SCNC: 5 MMOL/L — SIGNIFICANT CHANGE UP (ref 5–17)
APTT BLD: 33 SEC — SIGNIFICANT CHANGE UP (ref 24.5–35.6)
AST SERPL-CCNC: 20 U/L — SIGNIFICANT CHANGE UP (ref 15–37)
BASOPHILS # BLD AUTO: 0.02 K/UL — SIGNIFICANT CHANGE UP (ref 0–0.2)
BASOPHILS NFR BLD AUTO: 0.2 % — SIGNIFICANT CHANGE UP (ref 0–2)
BILIRUB SERPL-MCNC: 0.4 MG/DL — SIGNIFICANT CHANGE UP (ref 0.2–1.2)
BUN SERPL-MCNC: 17 MG/DL — SIGNIFICANT CHANGE UP (ref 7–23)
CALCIUM SERPL-MCNC: 8.5 MG/DL — SIGNIFICANT CHANGE UP (ref 8.5–10.1)
CHLORIDE SERPL-SCNC: 105 MMOL/L — SIGNIFICANT CHANGE UP (ref 96–108)
CO2 SERPL-SCNC: 30 MMOL/L — SIGNIFICANT CHANGE UP (ref 22–31)
CREAT SERPL-MCNC: 0.57 MG/DL — SIGNIFICANT CHANGE UP (ref 0.5–1.3)
EGFR: 92 ML/MIN/1.73M2 — SIGNIFICANT CHANGE UP
EOSINOPHIL # BLD AUTO: 0 K/UL — SIGNIFICANT CHANGE UP (ref 0–0.5)
EOSINOPHIL NFR BLD AUTO: 0 % — SIGNIFICANT CHANGE UP (ref 0–6)
GLUCOSE SERPL-MCNC: 162 MG/DL — HIGH (ref 70–99)
HCT VFR BLD CALC: 32 % — LOW (ref 39–50)
HGB BLD-MCNC: 11.2 G/DL — LOW (ref 13–17)
IMM GRANULOCYTES NFR BLD AUTO: 0.2 % — SIGNIFICANT CHANGE UP (ref 0–0.9)
INR BLD: 1.04 RATIO — SIGNIFICANT CHANGE UP (ref 0.85–1.18)
LACTATE SERPL-SCNC: 0.9 MMOL/L — SIGNIFICANT CHANGE UP (ref 0.7–2)
LYMPHOCYTES # BLD AUTO: 1.43 K/UL — SIGNIFICANT CHANGE UP (ref 1–3.3)
LYMPHOCYTES # BLD AUTO: 17.8 % — SIGNIFICANT CHANGE UP (ref 13–44)
MCHC RBC-ENTMCNC: 35 GM/DL — SIGNIFICANT CHANGE UP (ref 32–36)
MCHC RBC-ENTMCNC: 37 PG — HIGH (ref 27–34)
MCV RBC AUTO: 105.6 FL — HIGH (ref 80–100)
MONOCYTES # BLD AUTO: 0.75 K/UL — SIGNIFICANT CHANGE UP (ref 0–0.9)
MONOCYTES NFR BLD AUTO: 9.3 % — SIGNIFICANT CHANGE UP (ref 2–14)
NEUTROPHILS # BLD AUTO: 5.83 K/UL — SIGNIFICANT CHANGE UP (ref 1.8–7.4)
NEUTROPHILS NFR BLD AUTO: 72.5 % — SIGNIFICANT CHANGE UP (ref 43–77)
NRBC # BLD: 0 /100 WBCS — SIGNIFICANT CHANGE UP (ref 0–0)
PLATELET # BLD AUTO: 386 K/UL — SIGNIFICANT CHANGE UP (ref 150–400)
POTASSIUM SERPL-MCNC: 3.9 MMOL/L — SIGNIFICANT CHANGE UP (ref 3.5–5.3)
POTASSIUM SERPL-SCNC: 3.9 MMOL/L — SIGNIFICANT CHANGE UP (ref 3.5–5.3)
PROCALCITONIN SERPL-MCNC: 0.03 NG/ML — SIGNIFICANT CHANGE UP
PROT SERPL-MCNC: 5.6 G/DL — LOW (ref 6–8.3)
PROTHROM AB SERPL-ACNC: 12.1 SEC — SIGNIFICANT CHANGE UP (ref 9.5–13)
RAPID RVP RESULT: DETECTED
RBC # BLD: 3.03 M/UL — LOW (ref 4.2–5.8)
RBC # FLD: 12.8 % — SIGNIFICANT CHANGE UP (ref 10.3–14.5)
SARS-COV-2 RNA SPEC QL NAA+PROBE: DETECTED
SODIUM SERPL-SCNC: 140 MMOL/L — SIGNIFICANT CHANGE UP (ref 135–145)
WBC # BLD: 8.05 K/UL — SIGNIFICANT CHANGE UP (ref 3.8–10.5)
WBC # FLD AUTO: 8.05 K/UL — SIGNIFICANT CHANGE UP (ref 3.8–10.5)

## 2024-07-31 PROCEDURE — 71045 X-RAY EXAM CHEST 1 VIEW: CPT | Mod: 26

## 2024-07-31 PROCEDURE — 99285 EMERGENCY DEPT VISIT HI MDM: CPT | Mod: GC

## 2024-07-31 RX ORDER — ESCITALOPRAM OXALATE 20 MG
1 TABLET ORAL
Refills: 0 | DISCHARGE

## 2024-07-31 RX ORDER — GUAIFENESIN 100 MG/5ML
600 SOLUTION ORAL EVERY 12 HOURS
Refills: 0 | Status: DISCONTINUED | OUTPATIENT
Start: 2024-07-31 | End: 2024-08-01

## 2024-07-31 RX ORDER — MAGNESIUM, ALUMINUM HYDROXIDE 200-225/5
30 SUSPENSION, ORAL (FINAL DOSE FORM) ORAL EVERY 4 HOURS
Refills: 0 | Status: DISCONTINUED | OUTPATIENT
Start: 2024-07-31 | End: 2024-08-06

## 2024-07-31 RX ORDER — ENOXAPARIN SODIUM 120 MG/.8ML
40 INJECTION SUBCUTANEOUS EVERY 24 HOURS
Refills: 0 | Status: DISCONTINUED | OUTPATIENT
Start: 2024-07-31 | End: 2024-08-06

## 2024-07-31 RX ORDER — REMDESIVIR 100 MG/1
INJECTION, POWDER, LYOPHILIZED, FOR SOLUTION INTRAVENOUS
Refills: 0 | Status: COMPLETED | OUTPATIENT
Start: 2024-07-31 | End: 2024-08-02

## 2024-07-31 RX ORDER — ACETAMINOPHEN 500 MG
650 TABLET ORAL EVERY 6 HOURS
Refills: 0 | Status: DISCONTINUED | OUTPATIENT
Start: 2024-07-31 | End: 2024-08-06

## 2024-07-31 RX ORDER — DEXTROSE MONOHYDRATE, SODIUM CHLORIDE, SODIUM LACTATE, CALCIUM CHLORIDE, MAGNESIUM CHLORIDE 1.5; 538; 448; 18.4; 5.08 G/100ML; MG/100ML; MG/100ML; MG/100ML; MG/100ML
1550 SOLUTION INTRAPERITONEAL ONCE
Refills: 0 | Status: COMPLETED | OUTPATIENT
Start: 2024-07-31 | End: 2024-07-31

## 2024-07-31 RX ORDER — MELATONIN 3 MG
3 TABLET ORAL AT BEDTIME
Refills: 0 | Status: DISCONTINUED | OUTPATIENT
Start: 2024-07-31 | End: 2024-08-06

## 2024-07-31 RX ORDER — GUAIFENESIN 100 MG/5ML
100 SOLUTION ORAL EVERY 6 HOURS
Refills: 0 | Status: DISCONTINUED | OUTPATIENT
Start: 2024-07-31 | End: 2024-08-02

## 2024-07-31 RX ORDER — ONDANSETRON HCL/PF 4 MG/2 ML
4 VIAL (ML) INJECTION EVERY 8 HOURS
Refills: 0 | Status: DISCONTINUED | OUTPATIENT
Start: 2024-07-31 | End: 2024-08-06

## 2024-07-31 RX ORDER — REMDESIVIR 100 MG/1
100 INJECTION, POWDER, LYOPHILIZED, FOR SOLUTION INTRAVENOUS EVERY 24 HOURS
Refills: 0 | Status: COMPLETED | OUTPATIENT
Start: 2024-08-01 | End: 2024-08-02

## 2024-07-31 RX ORDER — AMLODIPINE BESYLATE 2.5 MG/1
2.5 TABLET ORAL AT BEDTIME
Refills: 0 | Status: DISCONTINUED | OUTPATIENT
Start: 2024-07-31 | End: 2024-08-06

## 2024-07-31 RX ORDER — ESCITALOPRAM OXALATE 20 MG
10 TABLET ORAL DAILY
Refills: 0 | Status: DISCONTINUED | OUTPATIENT
Start: 2024-07-31 | End: 2024-08-06

## 2024-07-31 RX ORDER — AZITHROMYCIN 250 MG
500 TABLET ORAL ONCE
Refills: 0 | Status: COMPLETED | OUTPATIENT
Start: 2024-07-31 | End: 2024-07-31

## 2024-07-31 RX ORDER — BENZONATATE 100 MG/1
100 CAPSULE, LIQUID FILLED ORAL THREE TIMES A DAY
Refills: 0 | Status: DISCONTINUED | OUTPATIENT
Start: 2024-07-31 | End: 2024-08-06

## 2024-07-31 RX ORDER — BACTERIOSTATIC SODIUM CHLORIDE 0.9 %
1000 VIAL (ML) INJECTION
Refills: 0 | Status: DISCONTINUED | OUTPATIENT
Start: 2024-07-31 | End: 2024-08-02

## 2024-07-31 RX ORDER — REMDESIVIR 100 MG/1
200 INJECTION, POWDER, LYOPHILIZED, FOR SOLUTION INTRAVENOUS EVERY 24 HOURS
Refills: 0 | Status: COMPLETED | OUTPATIENT
Start: 2024-07-31 | End: 2024-07-31

## 2024-07-31 RX ORDER — ATORVASTATIN CALCIUM 40 MG/1
80 TABLET, FILM COATED ORAL AT BEDTIME
Refills: 0 | Status: DISCONTINUED | OUTPATIENT
Start: 2024-07-31 | End: 2024-08-06

## 2024-07-31 RX ADMIN — AMLODIPINE BESYLATE 2.5 MILLIGRAM(S): 2.5 TABLET ORAL at 23:10

## 2024-07-31 RX ADMIN — Medication 50 MILLILITER(S): at 18:22

## 2024-07-31 RX ADMIN — REMDESIVIR 200 MILLIGRAM(S): 100 INJECTION, POWDER, LYOPHILIZED, FOR SOLUTION INTRAVENOUS at 14:07

## 2024-07-31 RX ADMIN — Medication 500 MILLIGRAM(S): at 13:00

## 2024-07-31 RX ADMIN — Medication 100 MILLIGRAM(S): at 13:09

## 2024-07-31 RX ADMIN — BENZONATATE 100 MILLIGRAM(S): 100 CAPSULE, LIQUID FILLED ORAL at 23:11

## 2024-07-31 RX ADMIN — DEXTROSE MONOHYDRATE, SODIUM CHLORIDE, SODIUM LACTATE, CALCIUM CHLORIDE, MAGNESIUM CHLORIDE 1550 MILLILITER(S): 1.5; 538; 448; 18.4; 5.08 SOLUTION INTRAPERITONEAL at 13:16

## 2024-07-31 RX ADMIN — DEXTROSE MONOHYDRATE, SODIUM CHLORIDE, SODIUM LACTATE, CALCIUM CHLORIDE, MAGNESIUM CHLORIDE 1550 MILLILITER(S): 1.5; 538; 448; 18.4; 5.08 SOLUTION INTRAPERITONEAL at 11:38

## 2024-07-31 RX ADMIN — GUAIFENESIN 600 MILLIGRAM(S): 100 SOLUTION ORAL at 18:22

## 2024-07-31 RX ADMIN — Medication 255 MILLIGRAM(S): at 11:38

## 2024-07-31 RX ADMIN — ATORVASTATIN CALCIUM 80 MILLIGRAM(S): 40 TABLET, FILM COATED ORAL at 23:10

## 2024-07-31 RX ADMIN — ENOXAPARIN SODIUM 40 MILLIGRAM(S): 120 INJECTION SUBCUTANEOUS at 18:22

## 2024-07-31 NOTE — CARE COORDINATION ASSESSMENT. - OTHER PERTINENT REFERRAL INFORMATION
Sw met with Spouse and Pt at bedside. Pt has Dementia and Pts spouse answered most of the questions. Pt lives with spouse in a private town home with 3 [address] and 13 steps inside. Pt was at [facility] from 2/22-2/26 and was discharged to [facility]. Pts spouse is the main caregiver for him but she has private aides that tend to his ADL"S for 16hrs x 7 days. Pt PCP is Rodriguez Londono [phone]. [facility] [phone]

## 2024-07-31 NOTE — ED ADULT TRIAGE NOTE - CHIEF COMPLAINT QUOTE
Patient is diagnosed with covid this morning. Patient is not eating or drinking. Patient was coughing for a week.

## 2024-07-31 NOTE — H&P ADULT - HISTORY OF PRESENT ILLNESS
This is a 89 y/o male with a PMHx of dementia presented to the ED with decreased food and water intake since yesterday. Pt's wife is at bedside. Pt's wife reports the pt has been coughing since yesterday and reports that "there is phlegm stuck in his throat" but he has not expectorated anything. Pt's wife denies pt having a fever, chills, pain, and N/V/D/C. Wife states pt usually eats regular meals but starting yesterday he has only eaten a "fruit cocktail and some chocolate yesterday" and "cheerios this morning." Wife tried giving him chicken and fish but he "spit it out." Wife says pt has only had a sip of water since yesterday. Pt was diagnosed with COVID-19 this morning and was brought from home. Wife says pt is at his mental baseline. Wife says he can eat independently but requires help with a Home Health Aide and wife for other ADLs. Wife denies recent travel history and sick contacts. This is a 89 y/o male with a PMHx of dementia presented to the ED with decreased food and water intake since yesterday. Pt's wife is at bedside. Pt's wife reports the pt has been coughing since yesterday and reports that "there is phlegm stuck in his throat" but he has not expectorated anything. Pt's wife denies pt having a fever, chills, pain, and N/V/D/C. Wife states pt usually eats regular meals but starting yesterday he has only eaten a "fruit cocktail and some chocolate yesterday" and "cheerios this morning." Wife tried giving him chicken and fish but he "spit it out." Wife says pt has only had a sip of water since yesterday. Pt was diagnosed with COVID-19 this morning and was brought from home. Wife says pt is at his mental baseline. Wife says he can eat independently but requires help with a Home Health Aide and wife for other ADLs. Wife denies recent travel history but + sick contacts (wife had COVID last week). Patient has HHA 16hours/day. Patient is bedbound.

## 2024-07-31 NOTE — ED ADULT NURSE NOTE - OBJECTIVE STATEMENT
pt from home with wife after testing positive today for covid. wife also with covid since thursday. has nonproductive moist cough. o2 sat WNL on RA. no fevers/chills. no N/V/D. chronic pain with movment. patient contracted. confused at baseline hx dementia. wife states loss of appetite and not drinking as much as usual. skin appears dry. EKG obtained and IVF infusing. afebrile rectal temp. IV antibiotics initiated.

## 2024-07-31 NOTE — ED ADULT NURSE NOTE - NSFALLHARMRISKINTERV_ED_ALL_ED
Assistance OOB with selected safe patient handling equipment if applicable/Assistance with ambulation/Communicate risk of Fall with Harm to all staff, patient, and family/Monitor gait and stability/Monitor for mental status changes and reorient to person, place, and time, as needed/Provide visual cue: red socks, yellow wristband, yellow gown, etc/Reinforce activity limits and safety measures with patient and family/Toileting schedule using arm’s reach rule for commode and bathroom/Use of alarms - bed, stretcher, chair and/or video monitoring/Bed in lowest position, wheels locked, appropriate side rails in place/Call bell, personal items and telephone in reach/Instruct patient to call for assistance before getting out of bed/chair/stretcher/Non-slip footwear applied when patient is off stretcher/[place] to call system/Physically safe environment - no spills, clutter or unnecessary equipment/Purposeful Proactive Rounding/Room/bathroom lighting operational, light cord in reach

## 2024-07-31 NOTE — ED PROVIDER NOTE - CLINICAL SUMMARY MEDICAL DECISION MAKING FREE TEXT BOX
Patient is a 89-year-old male residing at home with home aide assistance here now with 1 week of nonproductive cough congestion and 1 day of decreased p.o. intake both solids and liquids.  No vomiting no diarrhea no declining mental status.  Overall condition same.  This case will require thorough evaluation independent review of history and physical and performing independent history and physical labs cultures lactate fluids imaging of chest as well as antibiotics.

## 2024-07-31 NOTE — H&P ADULT - PROBLEM SELECTOR PLAN 1
Admit  Airborne precautions  Start remdesivir x 3days   Lovenox 40mg sq qday  Continuous pulse oximetry  Oxygen if needed for SaO2 < 90%  Start decadron if patient becomes hypoxic  Cough suppressants prn  Monitor COVID serologies  Tylenol prn for fever  ID consult  Further work-up/management pending clinical course. Admit  Airborne precautions  Start remdesivir x 3days   Lovenox 40mg sq qday  Continuous pulse oximetry  Oxygen if needed for SaO2 < 90%  Start decadron if patient becomes hypoxic  Cough suppressants prn  Monitor COVID serologies  Monitor off abx  Tylenol prn for fever  ID consult  Further work-up/management pending clinical course.

## 2024-07-31 NOTE — CARE COORDINATION ASSESSMENT. - NSCAREPROVIDERS_GEN_ALL_CORE_FT
CARE PROVIDERS:  Accepting Physician: Cuba Greco  Access Services: Isa Francois  Admitting: Cuba Greco  Attending: Cuba Greco  Consultant: Harrison Crespo  Covering Team: Cuba Greco  ED Attending: Brayden Diallo  ED Nurse: Shruthi Fortune  Emergency Medicine: Syd Uribe  Nurse: Marah Gonzalez  Nurse: Kelli Rivas  Nurse: Ofelia Justice  Outpatient Provider: Willis Londono  Override: Kelli Rivas  Primary Team: Lulu Alvarado  : Elissa Caba  : Brandy Titus  UR// Supp. Assoc.: Jazzmine Asif  UR// Supp. Assoc.: Kim Guzman

## 2024-07-31 NOTE — ED PROVIDER NOTE - NS ED ROS FT
REVIEW OF SYSTEMS:  ROS limited d/t dementia. Wife answered to best of his knowledge.  CONSTITUTIONAL: (+) Decreased appetite No fever/chills.    RESPIRATORY: (+) cough. No shortness of breath.  CARDIOVASCULAR: No chest pain, palpitations.  GASTROINTESTINAL: No abdominal or epigastric pain. No nausea or vomiting; No diarrhea or constipation.  GENITOURINARY: No dysuria, changes in frequency, hematuria, or incontinence  NEUROLOGICAL: Baseline strength  MUSCULOSKELETAL: No joint pain or swelling

## 2024-07-31 NOTE — ED PROVIDER NOTE - NSICDXPASTSURGICALHX_GEN_ALL_CORE_FT
PAST SURGICAL HISTORY:  H/O inguinal hernia repair     H/O shoulder surgery     History of kidney surgery remove cancer

## 2024-07-31 NOTE — ED PROVIDER NOTE - PHYSICAL EXAMINATION
CONSTITUTIONAL: Appears fatigued. Somnolent.  ENMT: Oral mucosa with moist membranes.   NECK: Supple, symmetric and without tracheal deviation   RESP: No respiratory distress, no use of accessory muscles; CTA b/l, no WRR  CV: RRR, +S1S2, no peripheral edema  GI: Soft, NT, ND  NEURO: Sensation intact in upper and lower extremities b/l to light touch   SKIN: Poor skin turgor  PSYCH: Axox1. Unresponsive to most questions

## 2024-07-31 NOTE — ED PROVIDER NOTE - CARE PLAN
Principal Discharge DX:	LLL pneumonia  Secondary Diagnosis:	COVID-19  Secondary Diagnosis:	Dehydration   1

## 2024-07-31 NOTE — CONSULT NOTE ADULT - SUBJECTIVE AND OBJECTIVE BOX
[facility] DIVISION of INFECTIOUS DISEASE  Harrison Crespo MD PhD, Rizwana Patel MD, Rosa Maria Jones MD, Minerva Hall MD, Azael Theodore MD  and providing coverage with Madhu Clements MD  Providing Infectious Disease Consultations at [facility], [facility], [place], [facility], [facility], [facility]'s    Office# [phone] to schedule follow up appointments  Answering Service for urgent calls or New Consults [phone]  Cell# to text for urgent issues Harrison Crespo [phone]     HPI: 89 y/o male with dementia, htn, DM-II and CAD presented to the ED with decreased food and water intake since Tuesday but wife suggests he has been sick for about one week. Pt's wife is at bedside. Pt's wife reports the pt has been coughing since Monday and reports that "there is phlegm stuck in his throat" but he has not expectorated anything. Pt's wife reports he has acute COVID and pt was diagnosed with COVID-19 with testing done in home.       PAST MEDICAL & SURGICAL HISTORY:  Hypertension      Hyperlipidemia      Anxiety      DM (diabetes mellitus)      Dementia      Malignant melanoma of left lower limb, including hip      Mild CAD  carotids      Cancer of skin      History of kidney surgery  remove cancer      H/O inguinal hernia repair      H/O shoulder surgery          Antimicrobials  cefTRIAXone   IVPB 1000 milliGRAM(s) IV Intermittent once      Immunological      Other      Allergies    amoxicillin (Unknown)  enalapril (Unknown)    Intolerances        SOCIAL HISTORY:  Social History:      FAMILY HISTORY:      ROS:    EYES:  Negative  blurry vision or double vision  GASTROINTESTINAL:  Negative for nausea, vomiting, diarrhea  -otherwise negative except for subjective    Vital Signs Last 24 Hrs  T(C): 36.8 (31 Jul 2024 12:00), Max: 36.8 (31 Jul 2024 10:50)  T(F): 98.2 (31 Jul 2024 12:00), Max: 98.3 (31 Jul 2024 10:50)  HR: 58 (31 Jul 2024 12:00) (58 - 74)  BP: 141/82 (31 Jul 2024 12:00) (133/62 - 141/82)  BP(mean): --  RR: 18 (31 Jul 2024 12:00) (18 - 18)  SpO2: 99% (31 Jul 2024 12:00) (96% - 99%)    Parameters below as of 31 Jul 2024 12:00  Patient On (Oxygen Delivery Method): room air        PE:  In no distress  HEENT:  NC, PERRL, sclerae anicteric, conjunctivae clear, EOMI.  Sinuses nontender, no nasal exudate.  No buccal or pharyngeal lesions, erythema or exudate  Neck:  Supple, no adenopathy  Lungs:  No accessory muscle use, bilaterally clear to auscultation  Cor:  distant  Abd:  Symmetric, normoactive BS.  Soft, nontender, no masses, guarding or rebound.  Liver and spleen not enlarged  Extrem:  No cyanosis or edema  Skin:  No rashes.  Neuro: limited        LABS:                        11.2   8.05  )-----------( 386      ( 31 Jul 2024 11:20 )             32.0       WBC Count: 8.05 K/uL (07-31-24 @ 11:20)      07-31    140  |  105  |  17  ----------------------------<  162<H>  3.9   |  30  |  0.57    Ca    8.5      31 Jul 2024 11:20    TPro  5.6<L>  /  Alb  2.7<L>  /  TBili  0.4  /  DBili  x   /  AST  20  /  ALT  25  /  AlkPhos  96  07-31      Creatinine: 0.57 mg/dL (07-31-24 @ 11:20)      Urinalysis Basic - ( 31 Jul 2024 11:20 )    Color: x / Appearance: x / SG: x / pH: x  Gluc: 162 mg/dL / Ketone: x  / Bili: x / Urobili: x   Blood: x / Protein: x / Nitrite: x   Leuk Esterase: x / RBC: x / WBC x   Sq Epi: x / Non Sq Epi: x / Bacteria: x              MICROBIOLOGY:      RADIOLOGY & ADDITIONAL STUDIES:    --< from: Xray Chest 1 View-PORTABLE IMMEDIATE (07.31.24 @ 12:13) >    ACC: [id number] EXAM:  XR CHEST PORTABLE IMMED 1V   ORDERED BY: ANGIE AGUSTIN     PROCEDURE DATE:  07/31/2024          INTERPRETATION:  AP chest on July 31, 2024 at 11:55 AM. Patient has   sepsis.    Heart magnified by technique. There is a small left base infiltrate   increased from February 23 this year. Old fracture deformity of the left   humerus again noted.    IMPRESSION: Small left base infiltrate at this time.

## 2024-07-31 NOTE — CARE COORDINATION ASSESSMENT. - NSPASTMEDSURGHISTORY_GEN_ALL_CORE_FT
PAST MEDICAL & SURGICAL HISTORY:  Anxiety      Hyperlipidemia      Hypertension      Cancer of skin      Mild CAD  carotids      Malignant melanoma of left lower limb, including hip      Dementia      DM (diabetes mellitus)      H/O inguinal hernia repair      History of kidney surgery  remove cancer      H/O shoulder surgery

## 2024-07-31 NOTE — INPATIENT CERTIFICATION FOR MEDICARE PATIENTS - THE SEVERITY OF SIGNS/SYMPTOMS. (SEE ED/ADMIT DOCUMENTS)
1. The severity of signs/symptoms.(See ED/admit documents) Render Note In Bullet Format When Appropriate: No Consent: The patient's consent was obtained including but not limited to risks of crusting, scabbing, blistering, scarring, darker or lighter pigmentary change, recurrence, incomplete removal and infection. Show Applicator Variable?: Yes Post-Care Instructions: I reviewed with the patient in detail post-care instructions. Patient is to wear sunprotection, and avoid picking at any of the treated lesions. Pt may apply Vaseline to crusted or scabbing areas.\\nWHAT TO EXPECT AFTER CRYOSURGERY (LIQUID NITROGEN) TREATMENT\\n\\n\\n Liquid Nitrogen is a very cold gas. In this liquid state it has a temperature of 320 degrees below zero Fahrenheit. Dermatologists use liquid nitrogen to treat certain skin growths such as warts, seborrheic and actinic keratoses, and a whole variety of other skin tumors. These skin growths are destroyed by the freezing action of this agent. With cryosurgery we have the advantage of being able to treat many skin growths and leave very little scarring. \\n\\n From a few hours to a few days after treatment, the area may blister, turn black, or form a scab. This is a desirable result. In some, no reaction is apparent.\\n\\n 1. You are allowed to get the area wet even immediately after treatment.\\n\\n 2. Most person have little or no pain from this treatment. You may have some swelling about the eyes, if cyrotherapy is performed on the forehead or temple.\\n\\n 3. It is not necessary to cover the area with a bandage. In fact, this is undesirable. Things heal better if left open to the air. You should protect the area from injury as much as possible. \\n\\n 4. Painful large blisters (even blisters filled with blood) can occur at times. These can be opened and the fluid drained out to relieve the pain. This may be done with a needle which has been cleaned with alcohol. \\n\\n 5. As the treated area heals the unwanted skin growth will fall off. This will take several days to weeks depending on the size and nature of the growth treated, the location on the skn and the way your body heals. \\n\\n 6. Allow the growth to fall off by itself - don't pick it or pull it off.\\n\\n 7. When the growth does come off, the skin underneath will be somewhat red. As time passes it will assume the color of normal skin. Don't bandage, pick at or apply any medications to the site after the growth has fallen off. The area may be sensitive to touch and be itchy as it heals. This is normal and it may take some time before it is exactly like the skin around it once more. \\n\\n\\n If you have any questions or concerns, please contact our office:         [facility] [phone] Duration Of Freeze Thaw-Cycle (Seconds): 0 Detail Level: Detailed Medical Necessity Clause: This procedure was medically necessary because the lesions that were treated were: Medical Necessity Information: It is in your best interest to select a reason for this procedure from the list below. All of these items fulfill various CMS LCD requirements except the new and changing color options. Post-Care Instructions: I reviewed with the patient in detail post-care instructions. Patient is to wear sunprotection, and avoid picking at any of the treated lesions. Pt may apply Vaseline to crusted or scabbing areas.

## 2024-07-31 NOTE — ED PROVIDER NOTE - OBJECTIVE STATEMENT
Julian Garza is a 89 y/o male with a PMHx of dementia presented to the ED with decreased food and water intake since yesterday. Pt's wife is at bedside. Pt's wife reports the pt has been coughing since yesterday and reports that "there is phlegm stuck in his throat" but he has not expectorated anything. Pt's wife denies pt having a fever, chills, pain, and N/V/D/C. Wife states pt usually eats regular meals but starting yesterday he has only eaten a "fruit cocktail and some chocolate yesterday" and "cheerios this morning." Wife tried giving him chicken and fish but he "spit it out." Wife says pt has only had a sip of water since yesterday. Pt was diagnosed with COVID-19 this morning and was brought her from home. Wife says pt is at his mental baseline. Wife says he can eat independently but requires help with a Home Health Aide and wife for other ADLs. Wife denies recent travel history and sick contacts. Julian Garza is a 89 y/o male with a PMHx of dementia presented to the ED with decreased food and water intake since yesterday. Pt's wife is at bedside. Pt's wife reports the pt has been coughing since yesterday and reports that "there is phlegm stuck in his throat" but he has not expectorated anything. Pt's wife denies pt having a fever, chills, pain, and N/V/D/C. Wife states pt usually eats regular meals but starting yesterday he has only eaten a "fruit cocktail and some chocolate yesterday" and "cheerios this morning." Wife tried giving him chicken and fish but he "spit it out." Wife says pt has only had a sip of water since yesterday. Pt was diagnosed with COVID-19 this morning and was brought from home. Wife says pt is at his mental baseline. Wife says he can eat independently but requires help with a Home Health Aide and wife for other ADLs. Wife denies recent travel history and sick contacts.

## 2024-07-31 NOTE — ED ADULT NURSE NOTE - NSFALLASSESSNEED_ED_ALL_ED
Medication: albuterol inh passed protocol.   Last office visit date: 8/30/2023  Next appointment scheduled?: Yes   Number of refills given: 12  
yes

## 2024-07-31 NOTE — ED PROVIDER NOTE - ATTENDING APP SHARED VISIT CONTRIBUTION OF CARE
Thin frail white male chronically ill-appearing in no acute distress HEENT normocephalic atraumatic pupils are equal round reactive to light and accommodation bitemporal wasting neck is supple no meningismus JVP is flat lungs coarse breath sounds bilaterally heart S1-S2 with any murmurs rubs gallops abdomen is soft flat scaphoid nontender extremities reveal flexion contractures of all 4 extremities and neurologically patient's awake but lethargic oriented to person.  Skin is warm and dry with decreased turgor I agree with plan and management outlined by resident.

## 2024-08-01 LAB
ALBUMIN SERPL ELPH-MCNC: 2.6 G/DL — LOW (ref 3.3–5)
ALBUMIN SERPL ELPH-MCNC: 2.7 G/DL — LOW (ref 3.3–5)
ALP SERPL-CCNC: 101 U/L — SIGNIFICANT CHANGE UP (ref 40–120)
ALP SERPL-CCNC: 99 U/L — SIGNIFICANT CHANGE UP (ref 40–120)
ALT FLD-CCNC: 22 U/L — SIGNIFICANT CHANGE UP (ref 12–78)
ALT FLD-CCNC: 23 U/L — SIGNIFICANT CHANGE UP (ref 12–78)
ANION GAP SERPL CALC-SCNC: 7 MMOL/L — SIGNIFICANT CHANGE UP (ref 5–17)
AST SERPL-CCNC: 25 U/L — SIGNIFICANT CHANGE UP (ref 15–37)
AST SERPL-CCNC: 26 U/L — SIGNIFICANT CHANGE UP (ref 15–37)
BILIRUB DIRECT SERPL-MCNC: 0.1 MG/DL — SIGNIFICANT CHANGE UP (ref 0–0.3)
BILIRUB INDIRECT FLD-MCNC: 0.4 MG/DL — SIGNIFICANT CHANGE UP (ref 0.2–1)
BILIRUB SERPL-MCNC: 0.4 MG/DL — SIGNIFICANT CHANGE UP (ref 0.2–1.2)
BILIRUB SERPL-MCNC: 0.5 MG/DL — SIGNIFICANT CHANGE UP (ref 0.2–1.2)
BUN SERPL-MCNC: 14 MG/DL — SIGNIFICANT CHANGE UP (ref 7–23)
CALCIUM SERPL-MCNC: 8.5 MG/DL — SIGNIFICANT CHANGE UP (ref 8.5–10.1)
CHLORIDE SERPL-SCNC: 106 MMOL/L — SIGNIFICANT CHANGE UP (ref 96–108)
CO2 SERPL-SCNC: 28 MMOL/L — SIGNIFICANT CHANGE UP (ref 22–31)
CREAT SERPL-MCNC: 0.51 MG/DL — SIGNIFICANT CHANGE UP (ref 0.5–1.3)
EGFR: 95 ML/MIN/1.73M2 — SIGNIFICANT CHANGE UP
FERRITIN SERPL-MCNC: 289 NG/ML — SIGNIFICANT CHANGE UP (ref 30–400)
GLUCOSE BLDC GLUCOMTR-MCNC: 148 MG/DL — HIGH (ref 70–99)
GLUCOSE SERPL-MCNC: 166 MG/DL — HIGH (ref 70–99)
HCT VFR BLD CALC: 32 % — LOW (ref 39–50)
HGB BLD-MCNC: 11.5 G/DL — LOW (ref 13–17)
INR BLD: 1.15 RATIO — SIGNIFICANT CHANGE UP (ref 0.85–1.18)
MAGNESIUM SERPL-MCNC: 1.6 MG/DL — SIGNIFICANT CHANGE UP (ref 1.6–2.6)
MCHC RBC-ENTMCNC: 35.9 GM/DL — SIGNIFICANT CHANGE UP (ref 32–36)
MCHC RBC-ENTMCNC: 37.5 PG — HIGH (ref 27–34)
MCV RBC AUTO: 104.2 FL — HIGH (ref 80–100)
NRBC # BLD: 0 /100 WBCS — SIGNIFICANT CHANGE UP (ref 0–0)
PLATELET # BLD AUTO: 400 K/UL — SIGNIFICANT CHANGE UP (ref 150–400)
POTASSIUM SERPL-MCNC: 3.8 MMOL/L — SIGNIFICANT CHANGE UP (ref 3.5–5.3)
POTASSIUM SERPL-SCNC: 3.8 MMOL/L — SIGNIFICANT CHANGE UP (ref 3.5–5.3)
PROCALCITONIN SERPL-MCNC: 0.04 NG/ML — SIGNIFICANT CHANGE UP
PROT SERPL-MCNC: 5.4 G/DL — LOW (ref 6–8.3)
PROT SERPL-MCNC: 5.4 G/DL — LOW (ref 6–8.3)
PROTHROM AB SERPL-ACNC: 13.4 SEC — HIGH (ref 9.5–13)
RBC # BLD: 3.07 M/UL — LOW (ref 4.2–5.8)
RBC # FLD: 12.6 % — SIGNIFICANT CHANGE UP (ref 10.3–14.5)
SODIUM SERPL-SCNC: 141 MMOL/L — SIGNIFICANT CHANGE UP (ref 135–145)
WBC # BLD: 9.07 K/UL — SIGNIFICANT CHANGE UP (ref 3.8–10.5)
WBC # FLD AUTO: 9.07 K/UL — SIGNIFICANT CHANGE UP (ref 3.8–10.5)

## 2024-08-01 RX ORDER — GUAIFENESIN/DEXTROMETHORPHAN 100-10MG/5
200 SYRUP ORAL EVERY 6 HOURS
Refills: 0 | Status: DISCONTINUED | OUTPATIENT
Start: 2024-08-01 | End: 2024-08-02

## 2024-08-01 RX ADMIN — ENOXAPARIN SODIUM 40 MILLIGRAM(S): 120 INJECTION SUBCUTANEOUS at 18:38

## 2024-08-01 RX ADMIN — AMLODIPINE BESYLATE 2.5 MILLIGRAM(S): 2.5 TABLET ORAL at 22:34

## 2024-08-01 RX ADMIN — Medication 10 MILLIGRAM(S): at 13:53

## 2024-08-01 RX ADMIN — BENZONATATE 100 MILLIGRAM(S): 100 CAPSULE, LIQUID FILLED ORAL at 22:34

## 2024-08-01 RX ADMIN — Medication 650 MILLIGRAM(S): at 23:34

## 2024-08-01 RX ADMIN — REMDESIVIR 200 MILLIGRAM(S): 100 INJECTION, POWDER, LYOPHILIZED, FOR SOLUTION INTRAVENOUS at 13:53

## 2024-08-01 RX ADMIN — BENZONATATE 100 MILLIGRAM(S): 100 CAPSULE, LIQUID FILLED ORAL at 13:53

## 2024-08-01 RX ADMIN — ATORVASTATIN CALCIUM 80 MILLIGRAM(S): 40 TABLET, FILM COATED ORAL at 22:34

## 2024-08-01 RX ADMIN — Medication 650 MILLIGRAM(S): at 22:34

## 2024-08-01 NOTE — PHYSICAL THERAPY INITIAL EVALUATION ADULT - PERTINENT HX OF CURRENT PROBLEM, REHAB EVAL
89 y/o male adm 7/31 with a PMHx of dementia presented to the ED with decreased food and water intake since 7/30, + COVID 19.

## 2024-08-01 NOTE — PHYSICAL THERAPY INITIAL EVALUATION ADULT - ADDITIONAL COMMENTS
Information obtained from pt's son via phone and spouse at bedside: Pt lives w/ his spouse and was non ambulatory and contracted. Pt has HHA 7xwk for 16hrs daily. Spouse stating HHA transfers pt from hospital bed into chair. Pt requires assistance for all ADLs.

## 2024-08-01 NOTE — ADVANCED PRACTICE NURSE CONSULT - RECOMMEDATIONS
R 5th digit unstageable pressure injury  ·	Paint with Cavilon and monitor for tissue changes.    Prevention  ·	Moisturize skin with Sween24 daily after bathing.  ·	Continue turning and positioning q2h and utilize offloading devices* as per protocol.  ·	*Small Alejo-Form positioner for in between the patients contracted LEs.  ·	Avoid use of diapers and utilize external catheters if patient unable to toilet OOB.  ·	Continue with only one breathable underpad and daily/PRN pericare for soiling.  ·	Waffle cushion if OOB to chair.  ·	Nutrition Consult for optimization in pt w/ increased nutritional needs.  ·	Encourage high quality protein, Azar/Prosource, MVI & Vit C to promote wound healing.

## 2024-08-01 NOTE — PHYSICAL THERAPY INITIAL EVALUATION ADULT - NSPTDISCHREC_GEN_A_CORE
d/c from PT- pt is non ambulatory at baseline, presenting w/ LE contractures and inability to follow commands. Pt is not a candidate for skilled therapeutic PT. Unable to make restorative goals./No skilled PT needs

## 2024-08-01 NOTE — PROGRESS NOTE ADULT - SUBJECTIVE AND OBJECTIVE BOX
[facility] DIVISION of INFECTIOUS DISEASE  Harrison Crespo MD PhD, Rizwana Patel MD, Rosa Maria Jones MD, Minerva Hall MD, Azael Theodore MD  and providing coverage with Madhu Clements MD  Providing Infectious Disease Consultations at [facility], [facility], [facility]'s    Office# [phone] to schedule follow up appointments  Answering Service for urgent calls or New Consults [phone]  Cell# to text for urgent issues Harrison Crespo [phone]     infectious diseases progress note:    DUSTIN JUNIOR is a 89y y. o. Male patient    Overnight and events of the last 24hrs reviewed    Allergies    amoxicillin (Unknown)  enalapril (Unknown)    Intolerances        ANTIBIOTICS/RELEVANT:  antimicrobials  remdesivir  IVPB 100 milliGRAM(s) IV Intermittent every 24 hours  remdesivir  IVPB   IV Intermittent     immunologic:    OTHER:  acetaminophen     Tablet .. 650 milliGRAM(s) Oral every 6 hours PRN  aluminum hydroxide/magnesium hydroxide/simethicone Suspension 30 milliLiter(s) Oral every 4 hours PRN  amLODIPine   Tablet 2.5 milliGRAM(s) Oral at bedtime  atorvastatin 80 milliGRAM(s) Oral at bedtime  benzonatate 100 milliGRAM(s) Oral three times a day  enoxaparin Injectable 40 milliGRAM(s) SubCutaneous every 24 hours  escitalopram 10 milliGRAM(s) Oral daily  guaiFENesin  milliGRAM(s) Oral every 12 hours  guaiFENesin Oral Liquid (Sugar-Free) 100 milliGRAM(s) Oral every 6 hours PRN  melatonin 3 milliGRAM(s) Oral at bedtime PRN  ondansetron Injectable 4 milliGRAM(s) IV Push every 8 hours PRN  sodium chloride 0.9%. 1000 milliLiter(s) IV Continuous <Continuous>      Objective:  Vital Signs Last 24 Hrs  T(C): 37 (01 Aug 2024 06:31), Max: 37 (01 Aug 2024 06:31)  T(F): 98.6 (01 Aug 2024 06:31), Max: 98.6 (01 Aug 2024 06:31)  HR: 71 (01 Aug 2024 06:31) (53 - 74)  BP: 134/61 (01 Aug 2024 06:31) (127/52 - 142/68)  BP(mean): --  RR: 19 (01 Aug 2024 06:31) (18 - 19)  SpO2: 96% (01 Aug 2024 06:31) (96% - 99%)    Parameters below as of 01 Aug 2024 06:31  Patient On (Oxygen Delivery Method): room air        T(C): 37 (08-01-24 @ 06:31), Max: 37 (08-01-24 @ 06:31)  T(C): 37 (08-01-24 @ 06:31), Max: 37 (08-01-24 @ 06:31)  T(C): 37 (08-01-24 @ 06:31), Max: 37 (08-01-24 @ 06:31)    PHYSICAL EXAM:  HEENT: NC atraumatic  Neck: supple  Respiratory: no accessory muscle use, breathing comfortably  Cardiovascular: distant  Gastrointestinal: normal appearing, nondistended  Extremities: no clubbing, no cyanosis,  Neuro: limited      LABS:                          11.5   9.07  )-----------( 400      ( 01 Aug 2024 08:08 )             32.0       WBC  9.07 08-01 @ 08:08  8.05 07-31 @ 11:20      08-01    141  |  106  |  14  ----------------------------<  166<H>  3.8   |  28  |  0.51    Ca    8.5      01 Aug 2024 08:08  Mg     1.6     08-01    TPro  5.4<L>  /  Alb  2.7<L>  /  TBili  0.4  /  DBili  0.1  /  AST  25  /  ALT  23  /  AlkPhos  101  08-01      Creatinine: 0.51 mg/dL (08-01-24 @ 08:08)  Creatinine: 0.57 mg/dL (07-31-24 @ 11:20)      PT/INR - ( 01 Aug 2024 08:08 )   PT: 13.4 sec;   INR: 1.15 ratio         PTT - ( 31 Jul 2024 11:20 )  PTT:33.0 sec  Urinalysis Basic - ( 01 Aug 2024 08:08 )    Color: x / Appearance: x / SG: x / pH: x  Gluc: 166 mg/dL / Ketone: x  / Bili: x / Urobili: x   Blood: x / Protein: x / Nitrite: x   Leuk Esterase: x / RBC: x / WBC x   Sq Epi: x / Non Sq Epi: x / Bacteria: x            INFLAMMATORY MARKERS      MICROBIOLOGY:              RADIOLOGY & ADDITIONAL STUDIES:

## 2024-08-01 NOTE — GOALS OF CARE CONVERSATION - ADVANCED CARE PLANNING - CONVERSATION DETAILS
[facility]-Palliative care SW met with patient's wife at bedside. Reviewed patient's medical and social history as well as events leading to patient's hospitalization. Writer discussed patient's current diagnosis (PNA, COVID, Dementia, HTN, HLD), medical condition and management. Patient has a HCP, Health care Declaration (Living Will) with wife Joanne as primary health care agent and sons Benjamin and Sae as alternate agents. As per medical chart, patient had a prior MOLST with DNR/DNI orders. Wife confirmed that wishes for DNR/DNI remain the same however she does not have MOLST form at home. New MOLST form completed with DNR/DNI order and placed on chart.  Wife showed insight into medical condition. All questions answered.   Psychosocial support provided.

## 2024-08-01 NOTE — PATIENT PROFILE ADULT - FALL HARM RISK - RISK INTERVENTIONS
Assistance OOB with selected safe patient handling equipment/Assistance with ambulation/Communicate Fall Risk and Risk Factors to all staff, patient, and family/Discuss with provider need for PT consult/Monitor for mental status changes/Monitor gait and stability/Move patient closer to nurses' station/Reinforce activity limits and safety measures with patient and family/Reorient to person, place and time as needed/Toileting schedule using arm’s reach rule for commode and bathroom/Use of alarms - bed, chair and/or voice tab/Visual Cue: Yellow wristband/Bed in lowest position, wheels locked, appropriate side rails in place/Call bell, personal items and telephone in reach/Instruct patient to call for assistance before getting out of bed or chair/Non-slip footwear when patient is out of bed/[place] to call system/Physically safe environment - no spills, clutter or unnecessary equipment/Purposeful Proactive Rounding/Room/bathroom lighting operational, light cord in reach

## 2024-08-01 NOTE — CASE MANAGEMENT PROGRESS NOTE - NSCMPROGRESSNOTE_GEN_ALL_CORE
Met with patient's wife, Joanne, at bedside, introduced self and explained role of CM and transitional care planning. She verbalized understanding. Spouse would like patient to return home upon DC. Confirmed patient has 16 hrs a day private aide services (2 8hr aides); per spouse, they are available on demand when patient is ready for DC. Discussed [facility] services and educated spouse on benefits of home care visiting nurse. She verbalized understanding and reports has had [facility] in the past and would like to use them again. When DC confirmed, patient will require ambulance transport home. CM will continue to follow.

## 2024-08-01 NOTE — ADVANCED PRACTICE NURSE CONSULT - ASSESSMENT
As per the H&P: This is a 89 y/o male with a PMHx of dementia presented to the ED with decreased food and water intake since yesterday. Pt's wife is at bedside. Pt's wife reports the pt has been coughing since yesterday and reports that "there is phlegm stuck in his throat" but he has not expectorated anything. Pt's wife denies pt having a fever, chills, pain, and N/V/D/C. Wife states pt usually eats regular meals but starting yesterday he has only eaten a "fruit cocktail and some chocolate yesterday" and "cheerios this morning." Wife tried giving him chicken and fish but he "spit it out." Wife says pt has only had a sip of water since yesterday. Pt was diagnosed with COVID-19 this morning and was brought from home. Wife says pt is at his mental baseline. Wife says he can eat independently but requires help with a Home Health Aide and wife for other ADLs. Wife denies recent travel history but + sick contacts (wife had COVID last week). Patient has HHA 16hours/day. Patient is bedbound.    Met with the patient on [facility]. Patient awake upon arrival and confused. Wife at the bedside. He is thin, pale, frail, and unable to turn and reposition independently. Offloading and pericare initiated upon admission as patient is increasingly sedentary 2/2 to illness. LEs noted to be contracted and resistant to extension. Currently wearing offloading boots from home. Removed for an assessment. R 5th digit with an area of dry, dark purple/black nonblanchable discoloration measuring 0.3cm x 0.5cm. Due to the patient's immobility status and contracted LEs, etiology of this wound likely pressure related. Wife reports there being a "dark spot" there for a couple of months. 3rd digit with dry, yellow skin removed to reveal intact skin. Painted with Cavilon.    B/L heels and R lateral foot with blanchable erythema.     As per the H&P: This is a 89 y/o male with a PMHx of dementia presented to the ED with decreased food and water intake since yesterday. Pt's wife is at bedside. Pt's wife reports the pt has been coughing since yesterday and reports that "there is phlegm stuck in his throat" but he has not expectorated anything. Pt's wife denies pt having a fever, chills, pain, and N/V/D/C. Wife states pt usually eats regular meals but starting yesterday he has only eaten a "fruit cocktail and some chocolate yesterday" and "cheerios this morning." Wife tried giving him chicken and fish but he "spit it out." Wife says pt has only had a sip of water since yesterday. Pt was diagnosed with COVID-19 this morning and was brought from home. Wife says pt is at his mental baseline. Wife says he can eat independently but requires help with a Home Health Aide and wife for other ADLs. Wife denies recent travel history but + sick contacts (wife had COVID last week). Patient has HHA 16hours/day. Patient is bedbound.    Met with the patient on [place]. Patient awake upon arrival and confused. Wife at the bedside. He is thin, pale, frail, and unable to turn and reposition independently. Offloading and pericare initiated upon admission as patient is increasingly sedentary 2/2 to illness. LEs noted to be contracted and resistant to extension. Currently wearing offloading boots from home.     Removed boots for an assessment. R 5th digit with an area of dry, dark purple/black nonblanchable discoloration measuring 0.3cm x 0.5cm. Due to the patient's immobility status and contracted LEs, etiology of this wound likely pressure related. Wife reports there being a "dark spot" to this toe for a couple of months. Distal to this discolored wound appears to be an area of dry, flaky skin. Patient's wife also reports something being there that was being treated by the patient's aide. 3rd digit with dry, yellow skin removed to reveal intact skin. Painted all areas with Cavilon.    B/L heels and R lateral foot with blanchable erythema. Painted with Cavilon and reapplied patient's own offloading boots.

## 2024-08-01 NOTE — PATIENT CHOICE NOTE. - NSPTCHOICESTATE_GEN_ALL_CORE

## 2024-08-01 NOTE — PROGRESS NOTE ADULT - ASSESSMENT
89 y/o male with dementia, htn, DM-II and CAD presented with acute COVID of <1 week duration prior to admission    RECOMMENDATIONS  COVID-19  Remdesivir x 3 days 7/31-8/2  avoid steroids  no clear indication for abx so obs off abx  avoid steroids  8/1-remains on room air    other issues will coordinate with medicine    Thank you for consulting us and involving us in the management of this most interesting and challenging case.  We will follow along in the care of this patient. Please call us at [phone] or text me directly on my cell# at [phone] with any concerns.

## 2024-08-01 NOTE — ADVANCED PRACTICE NURSE CONSULT - APN SPECIALTY LIST
Head, normocephalic , atraumatic , Face , Face within normal limits , Ears , External ears within normal limits , Nose/Nasopharynx , External nose  normal appearance , Mouth and Throat , Oral cavity appearance normal Wound Ostomy Care

## 2024-08-01 NOTE — CHART NOTE - NSCHARTNOTEFT_GEN_A_CORE
RN called, patient ordered for mucinex PO tablet however patient unable to swallow pills, requesting for liquid mucinex.    - Ordered Mucinex liquid 200mg q6h  - RN to call if any changes RN called, patient ordered for mucinex ER PO tablet however patient unable to swallow pills, requesting for liquid mucinex.  - Ordered Mucinex DM liquid 200mg q6h  - RN to call if any changes    _____________________________________________________________________  ADDENDUM  Called by pharmacy, patient has guaifensin (robitussin) PRN as ordered by Dr. Greco   - On further medication, primary team ordered both Mucinex (guaifenisin ER) as well as Robitussin (guaifenisin) liquid  - Will discontinue Mucinex DM liquid 200mg q6h standing  - RN can continue to administer PRN guaifenesin

## 2024-08-01 NOTE — PROGRESS NOTE ADULT - SUBJECTIVE AND OBJECTIVE BOX
Date of Service: 08-01-24 @ 15:59    Patient is a 89y old  Male who presents with a chief complaint of cough, not eating (01 Aug 2024 09:16)      INTERVAL HPI/OVERNIGHT EVENTS: Patient seen and examined. NAD. No complaints.    Vital Signs Last 24 Hrs  T(C): 37 (01 Aug 2024 12:45), Max: 37 (01 Aug 2024 06:31)  T(F): 98.6 (01 Aug 2024 12:45), Max: 98.6 (01 Aug 2024 06:31)  HR: 62 (01 Aug 2024 12:45) (59 - 71)  BP: 119/63 (01 Aug 2024 12:45) (119/63 - 138/68)  BP(mean): --  RR: 18 (01 Aug 2024 12:45) (18 - 19)  SpO2: 97% (01 Aug 2024 12:45) (96% - 98%)    Parameters below as of 01 Aug 2024 12:45  Patient On (Oxygen Delivery Method): room air        08-01    141  |  106  |  14  ----------------------------<  166<H>  3.8   |  28  |  0.51    Ca    8.5      01 Aug 2024 08:08  Mg     1.6     08-01    TPro  5.4<L>  /  Alb  2.7<L>  /  TBili  0.4  /  DBili  0.1  /  AST  25  /  ALT  23  /  AlkPhos  101  08-01                          11.5   9.07  )-----------( 400      ( 01 Aug 2024 08:08 )             32.0     PT/INR - ( 01 Aug 2024 08:08 )   PT: 13.4 sec;   INR: 1.15 ratio         PTT - ( 31 Jul 2024 11:20 )  PTT:33.0 sec  CAPILLARY BLOOD GLUCOSE        Urinalysis Basic - ( 01 Aug 2024 08:08 )    Color: x / Appearance: x / SG: x / pH: x  Gluc: 166 mg/dL / Ketone: x  / Bili: x / Urobili: x   Blood: x / Protein: x / Nitrite: x   Leuk Esterase: x / RBC: x / WBC x   Sq Epi: x / Non Sq Epi: x / Bacteria: x              acetaminophen     Tablet .. 650 milliGRAM(s) Oral every 6 hours PRN  aluminum hydroxide/magnesium hydroxide/simethicone Suspension 30 milliLiter(s) Oral every 4 hours PRN  amLODIPine   Tablet 2.5 milliGRAM(s) Oral at bedtime  atorvastatin 80 milliGRAM(s) Oral at bedtime  benzonatate 100 milliGRAM(s) Oral three times a day  enoxaparin Injectable 40 milliGRAM(s) SubCutaneous every 24 hours  escitalopram 10 milliGRAM(s) Oral daily  guaiFENesin  milliGRAM(s) Oral every 12 hours  guaiFENesin Oral Liquid (Sugar-Free) 100 milliGRAM(s) Oral every 6 hours PRN  melatonin 3 milliGRAM(s) Oral at bedtime PRN  ondansetron Injectable 4 milliGRAM(s) IV Push every 8 hours PRN  remdesivir  IVPB 100 milliGRAM(s) IV Intermittent every 24 hours  remdesivir  IVPB   IV Intermittent   sodium chloride 0.9%. 1000 milliLiter(s) IV Continuous <Continuous>              REVIEW OF SYSTEMS:  CONSTITUTIONAL: No fever, no weight loss, or no fatigue  NECK: No pain, no stiffness  RESPIRATORY: No cough, no wheezing, no chills, no hemoptysis, No shortness of breath  CARDIOVASCULAR: No chest pain, no palpitations, no dizziness, no leg swelling  GASTROINTESTINAL: No abdominal pain. No nausea, no vomiting, no hematemesis; No diarrhea, no constipation. No melena, no hematochezia.  GENITOURINARY: No dysuria, no frequency, no hematuria, no incontinence  NEUROLOGICAL: No headaches, no loss of strength, no numbness, no tremors  SKIN: No itching, no burning  MUSCULOSKELETAL: No joint pain, no swelling; No muscle, no back, no extremity pain  PSYCHIATRIC: No depression, no mood swings,   HEME/LYMPH: No easy bruising, no bleeding gums  ALLERY AND IMMUNOLOGIC: No hives       Consultant(s) Notes Reviewed:  [X] YES  [ ] NO    PHYSICAL EXAM:  GENERAL: NAD  HEAD:  Atraumatic, Normocephalic  EYES: EOMI, PERRLA, conjunctiva and sclera clear  ENMT: No tonsillar erythema, exudates, or enlargement; Moist mucous membranes  NECK: Supple, No JVD  NERVOUS SYSTEM:  Awake & alert  CHEST/LUNG: Clear to auscultation bilaterally; No rales, rhonchi, wheezing,  HEART: Regular rate and rhythm  ABDOMEN: Soft, Nontender, Nondistended; Bowel sounds present  EXTREMITIES:  No clubbing, cyanosis, or edema  LYMPH: No lymphadenopathy noted  SKIN: No rashes      Advanced care planning discussed with patient/family [X] YES   [ ] NO    Advanced care planning discussed with patient/family. Patient's health status was discussed. All appropriate changes have been made regarding patient's end-of-life care. Advanced care planning forms reviewed/discussed/completed.  20 minutes spent.

## 2024-08-02 ENCOUNTER — TRANSCRIPTION ENCOUNTER (OUTPATIENT)
Age: 89
End: 2024-08-02

## 2024-08-02 LAB
ALBUMIN SERPL ELPH-MCNC: 2.7 G/DL — LOW (ref 3.3–5)
ALP SERPL-CCNC: 105 U/L — SIGNIFICANT CHANGE UP (ref 40–120)
ALT FLD-CCNC: 25 U/L — SIGNIFICANT CHANGE UP (ref 12–78)
AST SERPL-CCNC: 40 U/L — HIGH (ref 15–37)
BILIRUB DIRECT SERPL-MCNC: 0.2 MG/DL — SIGNIFICANT CHANGE UP (ref 0–0.3)
BILIRUB INDIRECT FLD-MCNC: 0.4 MG/DL — SIGNIFICANT CHANGE UP (ref 0.2–1)
BILIRUB SERPL-MCNC: 0.6 MG/DL — SIGNIFICANT CHANGE UP (ref 0.2–1.2)
CREAT SERPL-MCNC: 0.47 MG/DL — LOW (ref 0.5–1.3)
EGFR: 98 ML/MIN/1.73M2 — SIGNIFICANT CHANGE UP
INR BLD: 1.2 RATIO — HIGH (ref 0.85–1.18)
NT-PROBNP SERPL-SCNC: 680 PG/ML — HIGH (ref 0–450)
PROT SERPL-MCNC: 5.6 G/DL — LOW (ref 6–8.3)
PROTHROM AB SERPL-ACNC: 14 SEC — HIGH (ref 9.5–13)

## 2024-08-02 RX ORDER — BACTERIOSTATIC SODIUM CHLORIDE 0.9 %
4 VIAL (ML) INJECTION EVERY 12 HOURS
Refills: 0 | Status: DISCONTINUED | OUTPATIENT
Start: 2024-08-02 | End: 2024-08-06

## 2024-08-02 RX ORDER — GUAIFENESIN 100 MG/5ML
100 SOLUTION ORAL EVERY 6 HOURS
Refills: 0 | Status: DISCONTINUED | OUTPATIENT
Start: 2024-08-02 | End: 2024-08-06

## 2024-08-02 RX ORDER — ALBUTEROL 90 MCG
2.5 AEROSOL REFILL (GRAM) INHALATION ONCE
Refills: 0 | Status: COMPLETED | OUTPATIENT
Start: 2024-08-02 | End: 2024-08-03

## 2024-08-02 RX ORDER — ALBUTEROL 90 MCG
2.5 AEROSOL REFILL (GRAM) INHALATION EVERY 12 HOURS
Refills: 0 | Status: DISCONTINUED | OUTPATIENT
Start: 2024-08-02 | End: 2024-08-06

## 2024-08-02 RX ADMIN — BENZONATATE 100 MILLIGRAM(S): 100 CAPSULE, LIQUID FILLED ORAL at 06:13

## 2024-08-02 RX ADMIN — Medication 1 DROP(S): at 21:17

## 2024-08-02 RX ADMIN — ENOXAPARIN SODIUM 40 MILLIGRAM(S): 120 INJECTION SUBCUTANEOUS at 19:04

## 2024-08-02 RX ADMIN — BENZONATATE 100 MILLIGRAM(S): 100 CAPSULE, LIQUID FILLED ORAL at 13:32

## 2024-08-02 RX ADMIN — Medication 1 DROP(S): at 00:48

## 2024-08-02 RX ADMIN — GUAIFENESIN 100 MILLIGRAM(S): 100 SOLUTION ORAL at 19:04

## 2024-08-02 RX ADMIN — REMDESIVIR 200 MILLIGRAM(S): 100 INJECTION, POWDER, LYOPHILIZED, FOR SOLUTION INTRAVENOUS at 13:31

## 2024-08-02 RX ADMIN — Medication 1 DROP(S): at 12:18

## 2024-08-02 RX ADMIN — ATORVASTATIN CALCIUM 80 MILLIGRAM(S): 40 TABLET, FILM COATED ORAL at 21:17

## 2024-08-02 RX ADMIN — Medication 10 MILLIGRAM(S): at 12:18

## 2024-08-02 RX ADMIN — BENZONATATE 100 MILLIGRAM(S): 100 CAPSULE, LIQUID FILLED ORAL at 21:17

## 2024-08-02 RX ADMIN — Medication 1 DROP(S): at 06:13

## 2024-08-02 RX ADMIN — AMLODIPINE BESYLATE 2.5 MILLIGRAM(S): 2.5 TABLET ORAL at 21:18

## 2024-08-02 RX ADMIN — Medication 1 DROP(S): at 19:04

## 2024-08-02 NOTE — DISCHARGE NOTE PROVIDER - DETAILS OF MALNUTRITION DIAGNOSIS/DIAGNOSES
This patient has been assessed with a concern for Malnutrition and was treated during this hospitalization for the following Nutrition diagnosis/diagnoses:     -  08/03/2024: Underweight (BMI < 19)

## 2024-08-02 NOTE — CASE MANAGEMENT PROGRESS NOTE - NSCMPROGRESSNOTE_GEN_ALL_CORE
Patient is anticipated for DC home tomorrow. CM met with patient's spouse at bedside to discuss anticipated plan for DC home tomorrow with reinstatement of private hire aides. Spouse confirmed she will reinstate the aide and confirmed aides are readily available to meet patient at home. Referral for [facility] submitted, pending acceptance. Ambulance arranged for transport home tomorrow via [facility] for 12pm. CM discussed with spouse whether patient has post hospitalization MD follow up appointment scheduled, per spouse she will coordinate once patient is home; [facility] CM provided spouse contact information for [facility], and also provided list of other home visiting providers to review. She verbalized understanding. CM will continue to follow.

## 2024-08-02 NOTE — PROGRESS NOTE ADULT - SUBJECTIVE AND OBJECTIVE BOX
Date of Service: 08-02-24 @ 11:09    Patient is a 89y old  Male who presents with a chief complaint of cough, not eating (02 Aug 2024 09:23)      INTERVAL HPI/OVERNIGHT EVENTS: Patient seen and examined. NAD. No complaints.    Vital Signs Last 24 Hrs  T(C): 36.4 (02 Aug 2024 05:37), Max: 38.1 (01 Aug 2024 19:57)  T(F): 97.6 (02 Aug 2024 05:37), Max: 100.6 (01 Aug 2024 19:57)  HR: 60 (02 Aug 2024 05:37) (60 - 67)  BP: 115/48 (02 Aug 2024 05:37) (111/70 - 119/63)  BP(mean): --  RR: 19 (02 Aug 2024 05:37) (18 - 19)  SpO2: 92% (02 Aug 2024 05:37) (92% - 97%)    Parameters below as of 02 Aug 2024 05:37  Patient On (Oxygen Delivery Method): room air        08-01    141  |  106  |  14  ----------------------------<  166<H>  3.8   |  28  |  0.51    Ca    8.5      01 Aug 2024 08:08  Mg     1.6     08-01    TPro  5.4<L>  /  Alb  2.7<L>  /  TBili  0.4  /  DBili  0.1  /  AST  25  /  ALT  23  /  AlkPhos  101  08-01                          11.5   9.07  )-----------( 400      ( 01 Aug 2024 08:08 )             32.0     PT/INR - ( 01 Aug 2024 08:08 )   PT: 13.4 sec;   INR: 1.15 ratio         PTT - ( 31 Jul 2024 11:20 )  PTT:33.0 sec  CAPILLARY BLOOD GLUCOSE      POCT Blood Glucose.: 148 mg/dL (01 Aug 2024 16:47)    Urinalysis Basic - ( 01 Aug 2024 08:08 )    Color: x / Appearance: x / SG: x / pH: x  Gluc: 166 mg/dL / Ketone: x  / Bili: x / Urobili: x   Blood: x / Protein: x / Nitrite: x   Leuk Esterase: x / RBC: x / WBC x   Sq Epi: x / Non Sq Epi: x / Bacteria: x              acetaminophen     Tablet .. 650 milliGRAM(s) Oral every 6 hours PRN  aluminum hydroxide/magnesium hydroxide/simethicone Suspension 30 milliLiter(s) Oral every 4 hours PRN  amLODIPine   Tablet 2.5 milliGRAM(s) Oral at bedtime  artificial tears (preservative free) Ophthalmic Solution 1 Drop(s) Both EYES four times a day  atorvastatin 80 milliGRAM(s) Oral at bedtime  benzonatate 100 milliGRAM(s) Oral three times a day  enoxaparin Injectable 40 milliGRAM(s) SubCutaneous every 24 hours  escitalopram 10 milliGRAM(s) Oral daily  guaiFENesin Oral Liquid (Sugar-Free) 100 milliGRAM(s) Oral every 6 hours PRN  melatonin 3 milliGRAM(s) Oral at bedtime PRN  ondansetron Injectable 4 milliGRAM(s) IV Push every 8 hours PRN  remdesivir  IVPB 100 milliGRAM(s) IV Intermittent every 24 hours  remdesivir  IVPB   IV Intermittent               REVIEW OF SYSTEMS:  CONSTITUTIONAL: + fever, no weight loss, or no fatigue  NECK: No pain, no stiffness  RESPIRATORY: No cough, no wheezing, no chills, no hemoptysis, No shortness of breath  CARDIOVASCULAR: No chest pain, no palpitations, no dizziness, no leg swelling  GASTROINTESTINAL: No abdominal pain. No nausea, no vomiting, no hematemesis; No diarrhea, no constipation. No melena, no hematochezia.  GENITOURINARY: No dysuria, no frequency, no hematuria, no incontinence  NEUROLOGICAL: No headaches, no loss of strength, no numbness, no tremors  SKIN: No itching, no burning  MUSCULOSKELETAL: No joint pain, no swelling; No muscle, no back, no extremity pain  PSYCHIATRIC: No depression, no mood swings,   HEME/LYMPH: No easy bruising, no bleeding gums  ALLERY AND IMMUNOLOGIC: No hives       Consultant(s) Notes Reviewed:  [X] YES  [ ] NO    PHYSICAL EXAM:  GENERAL: NAD  HEAD:  Atraumatic, Normocephalic  EYES: EOMI, PERRLA, conjunctiva and sclera clear  ENMT: No tonsillar erythema, exudates, or enlargement; Moist mucous membranes  NECK: Supple, No JVD  NERVOUS SYSTEM:  Awake & alert  CHEST/LUNG: Clear to auscultation bilaterally; No rales, rhonchi, wheezing,  HEART: Regular rate and rhythm  ABDOMEN: Soft, Nontender, Nondistended; Bowel sounds present  EXTREMITIES:  No clubbing, cyanosis, or edema  LYMPH: No lymphadenopathy noted  SKIN: No rashes      Advanced care planning discussed with patient/family [X] YES   [ ] NO    Advanced care planning discussed with patient/family. Patient's health status was discussed. All appropriate changes have been made regarding patient's end-of-life care. Advanced care planning forms reviewed/discussed/completed.  20 minutes spent.

## 2024-08-02 NOTE — DISCHARGE NOTE NURSING/CASE MANAGEMENT/SOCIAL WORK - PATIENT PORTAL LINK FT
You can access the FollowMyHealth Patient Portal offered by [facility] by registering at the following website: [email or web address]. By joining [facility]’s FollowMyHealth portal, you will also be able to view your health information using other applications (apps) compatible with our system.

## 2024-08-02 NOTE — DISCHARGE NOTE NURSING/CASE MANAGEMENT/SOCIAL WORK - NSSCCONTNUM_GEN_ALL_CORE
[phone]  (Please contact the home care agency if you do not hear from them within 24-48 hours after hospital discharge).

## 2024-08-02 NOTE — DISCHARGE NOTE PROVIDER - HOSPITAL COURSE
This is a 89 y/o male with a PMHx of dementia presented to the ED with decreased food and water intake since yesterday. Pt's wife is at bedside. Pt's wife reports the pt has been coughing since yesterday and reports that "there is phlegm stuck in his throat" but he has not expectorated anything. Pt's wife denies pt having a fever, chills, pain, and N/V/D/C. Wife states pt usually eats regular meals but starting yesterday he has only eaten a "fruit cocktail and some chocolate yesterday" and "cheerios this morning." Wife tried giving him chicken and fish but he "spit it out." Wife says pt has only had a sip of water since yesterday. Pt was diagnosed with COVID-19 this morning and was brought from home. Wife says pt is at his mental baseline. Wife says he can eat independently but requires help with a Home Health Aide and wife for other ADLs. Wife denies recent travel history but + sick contacts (wife had COVID last week). Patient has HHA 16hours/day. Patient is bedbound.    Patient treated for COVID with 3 days of iv remdesivir  Tolerated RA    >35 minutes spent on discharge

## 2024-08-02 NOTE — DISCHARGE NOTE PROVIDER - NSDCCPCAREPLAN_GEN_ALL_CORE_FT
PRINCIPAL DISCHARGE DIAGNOSIS  Diagnosis: COVID-19  Assessment and Plan of Treatment: Finished treatement  Follow-up with your primary care doctor within 1 week.        SECONDARY DISCHARGE DIAGNOSES  Diagnosis: Dehydration  Assessment and Plan of Treatment: Stay hydratated  Follow-up with your primary care doctor within 1 week.

## 2024-08-02 NOTE — PROGRESS NOTE ADULT - SUBJECTIVE AND OBJECTIVE BOX
[facility] DIVISION of INFECTIOUS DISEASE  Harrison Crespo MD PhD, Rizwana Patel MD, Rosa Maria Jones MD, Minerva Hall MD, Azael Theodore MD  and providing coverage with Madhu Clements MD  Providing Infectious Disease Consultations at [facility], [facility], [facility]'s    Office# [phone] to schedule follow up appointments  Answering Service for urgent calls or New Consults [phone]  Cell# to text for urgent issues Harrison Crespo [phone]     infectious diseases progress note:    DUSTIN JUNIOR is a 89y y. o. Male patient    Overnight and events of the last 24hrs reviewed    Allergies    amoxicillin (Unknown)  enalapril (Unknown)    Intolerances        ANTIBIOTICS/RELEVANT:  antimicrobials  remdesivir  IVPB   IV Intermittent   remdesivir  IVPB 100 milliGRAM(s) IV Intermittent every 24 hours    immunologic:    OTHER:  acetaminophen     Tablet .. 650 milliGRAM(s) Oral every 6 hours PRN  aluminum hydroxide/magnesium hydroxide/simethicone Suspension 30 milliLiter(s) Oral every 4 hours PRN  amLODIPine   Tablet 2.5 milliGRAM(s) Oral at bedtime  artificial tears (preservative free) Ophthalmic Solution 1 Drop(s) Both EYES four times a day  atorvastatin 80 milliGRAM(s) Oral at bedtime  benzonatate 100 milliGRAM(s) Oral three times a day  enoxaparin Injectable 40 milliGRAM(s) SubCutaneous every 24 hours  escitalopram 10 milliGRAM(s) Oral daily  guaiFENesin Oral Liquid (Sugar-Free) 100 milliGRAM(s) Oral every 6 hours PRN  melatonin 3 milliGRAM(s) Oral at bedtime PRN  ondansetron Injectable 4 milliGRAM(s) IV Push every 8 hours PRN      Objective:  Vital Signs Last 24 Hrs  T(C): 36.4 (02 Aug 2024 05:37), Max: 38.1 (01 Aug 2024 19:57)  T(F): 97.6 (02 Aug 2024 05:37), Max: 100.6 (01 Aug 2024 19:57)  HR: 60 (02 Aug 2024 05:37) (60 - 67)  BP: 115/48 (02 Aug 2024 05:37) (111/70 - 119/63)  BP(mean): --  RR: 19 (02 Aug 2024 05:37) (18 - 19)  SpO2: 92% (02 Aug 2024 05:37) (92% - 97%)    Parameters below as of 02 Aug 2024 05:37  Patient On (Oxygen Delivery Method): room air        T(C): 36.4 (08-02-24 @ 05:37), Max: 38.1 (08-01-24 @ 19:57)  T(C): 36.4 (08-02-24 @ 05:37), Max: 38.1 (08-01-24 @ 19:57)  T(C): 36.4 (08-02-24 @ 05:37), Max: 38.1 (08-01-24 @ 19:57)    PHYSICAL EXAM:  HEENT: NC atraumatic  Neck: supple  Respiratory: no accessory muscle use, breathing comfortably  Cardiovascular: distant  Gastrointestinal: normal appearing, nondistended  Extremities: no clubbing, no cyanosis,        LABS:                          11.5   9.07  )-----------( 400      ( 01 Aug 2024 08:08 )             32.0       WBC  9.07 08-01 @ 08:08  8.05 07-31 @ 11:20      08-01    141  |  106  |  14  ----------------------------<  166<H>  3.8   |  28  |  0.51    Ca    8.5      01 Aug 2024 08:08  Mg     1.6     08-01    TPro  5.4<L>  /  Alb  2.7<L>  /  TBili  0.4  /  DBili  0.1  /  AST  25  /  ALT  23  /  AlkPhos  101  08-01      Creatinine: 0.51 mg/dL (08-01-24 @ 08:08)  Creatinine: 0.57 mg/dL (07-31-24 @ 11:20)      PT/INR - ( 01 Aug 2024 08:08 )   PT: 13.4 sec;   INR: 1.15 ratio         PTT - ( 31 Jul 2024 11:20 )  PTT:33.0 sec  Urinalysis Basic - ( 01 Aug 2024 08:08 )    Color: x / Appearance: x / SG: x / pH: x  Gluc: 166 mg/dL / Ketone: x  / Bili: x / Urobili: x   Blood: x / Protein: x / Nitrite: x   Leuk Esterase: x / RBC: x / WBC x   Sq Epi: x / Non Sq Epi: x / Bacteria: x            INFLAMMATORY MARKERS      MICROBIOLOGY:              RADIOLOGY & ADDITIONAL STUDIES:

## 2024-08-02 NOTE — DISCHARGE NOTE PROVIDER - CARE PROVIDER_API CALL
Willis Londono  Internal Medicine  [address], [address]  [place] [address]  Phone: [phone]  Fax: [phone]  Established Patient  Follow Up Time: 1 week

## 2024-08-02 NOTE — DISCHARGE NOTE PROVIDER - NSDCMRMEDTOKEN_GEN_ALL_CORE_FT
amLODIPine 2.5 mg oral tablet: 1 tab(s) orally once a day (at bedtime)  fluvastatin 80 mg oral tablet, extended release: 1 tab(s) orally once a day  Lexapro 10 mg oral tablet: 1 tab(s) orally once a day   amLODIPine 2.5 mg oral tablet: 1 tab(s) orally once a day (at bedtime)  fluvastatin 80 mg oral tablet, extended release: 1 tab(s) orally once a day  guaiFENesin 100 mg/5 mL oral liquid: 5 milliliter(s) orally every 6 hours as needed for  cough  Lexapro 10 mg oral tablet: 1 tab(s) orally once a day

## 2024-08-02 NOTE — PROGRESS NOTE ADULT - ASSESSMENT
89 y/o male with dementia, htn, DM-II and CAD presented with acute COVID of <1 week duration prior to admission    RECOMMENDATIONS  COVID-19  Remdesivir x 3 days 7/31-8/2  avoid steroids  no clear indication for abx so obs off abx  avoid steroids  -remains on room air and very somnolent    other issues will coordinate with medicine    Thank you for consulting us and involving us in the management of this most interesting and challenging case.  We will follow along in the care of this patient. Please call us at [phone] or text me directly on my cell# at [phone] with any concerns.

## 2024-08-02 NOTE — CONSULT NOTE ADULT - SUBJECTIVE AND OBJECTIVE BOX
Date/Time Patient Seen:  		  Referring MD:   Data Reviewed	       Patient is a 89y old  Male who presents with a chief complaint of cough, not eating (02 Aug 2024 11:09)      Subjective/HPI   89y old  Male who presents with a chief complaint of cough, not eating  PAST MEDICAL & SURGICAL HISTORY:  Hypertension    Hyperlipidemia    Anxiety    DM (diabetes mellitus)    Dementia    Malignant melanoma of left lower limb, including hip    Mild CAD  carotids    Cancer of skin    No significant past surgical history    History of kidney surgery  remove cancer    H/O inguinal hernia repair    H/O shoulder surgery    This is a 89 y/o male with a PMHx of dementia presented to the ED with decreased food and water intake since yesterday. Pt's wife is at bedside. Pt's wife reports the pt has been coughing since yesterday and reports that "there is phlegm stuck in his throat" but he has not expectorated anything. Pt's wife denies pt having a fever, chills, pain, and N/V/D/C. Wife states pt usually eats regular meals but starting yesterday he has only eaten a "fruit cocktail and some chocolate yesterday" and "cheerios this morning." Wife tried giving him chicken and fish but he "spit it out." Wife says pt has only had a sip of water since yesterday. Pt was diagnosed with COVID-19 this morning and was brought from home. Wife says pt is at his mental baseline. Wife says he can eat independently but requires help with a Home Health Aide and wife for other ADLs. Wife denies recent travel history but + sick contacts (wife had COVID last week). Patient has HHA 16hours/day. Patient is bedbound.      Medication list         MEDICATIONS  (STANDING):  albuterol    0.083%. 2.5 milliGRAM(s) Nebulizer once  amLODIPine   Tablet 2.5 milliGRAM(s) Oral at bedtime  artificial tears (preservative free) Ophthalmic Solution 1 Drop(s) Both EYES four times a day  atorvastatin 80 milliGRAM(s) Oral at bedtime  benzonatate 100 milliGRAM(s) Oral three times a day  enoxaparin Injectable 40 milliGRAM(s) SubCutaneous every 24 hours  escitalopram 10 milliGRAM(s) Oral daily  guaiFENesin Oral Liquid (Sugar-Free) 100 milliGRAM(s) Oral every 6 hours    MEDICATIONS  (PRN):  acetaminophen     Tablet .. 650 milliGRAM(s) Oral every 6 hours PRN Temp greater or equal to 38C (100.4F), Mild Pain (1 - 3)  aluminum hydroxide/magnesium hydroxide/simethicone Suspension 30 milliLiter(s) Oral every 4 hours PRN Dyspepsia  melatonin 3 milliGRAM(s) Oral at bedtime PRN Insomnia  ondansetron Injectable 4 milliGRAM(s) IV Push every 8 hours PRN Nausea and/or Vomiting      · Endocrine	negative  · Allergic/Immunologic	negative      Allergies and Intolerances:        Allergies:  	amoxicillin: Drug, Unknown  	enalapril: Drug, Unknown    Home Medications:   * Patient Currently Takes Medications as of 26-Feb-2024 08:13 documented in Structured Notes  · 	traMADol 50 mg oral tablet: 1 tab(s) orally every 6 hours As needed Severe Pain (7 - 10)  · 	traMADol 50 mg oral tablet: 0.5 tab(s) orally every 6 hours As needed Moderate Pain (4 - 6)  · 	enoxaparin: 40 milligram(s) subcutaneous once a day  · 	acetaminophen 325 mg oral tablet: 2 tab(s) orally every 6 hours As needed Temp greater or equal to 38C (100.4F), Mild Pain (1 - 3)  · 	fluvastatin 80 mg oral tablet, extended release: 1 tab(s) orally once a day  · 	amLODIPine 2.5 mg oral tablet: 1 tab(s) orally once a day (at bedtime)    Patient History:    Past Medical, Past Surgical, and Family History:  PAST MEDICAL HISTORY:  Anxiety     Cancer of skin     Dementia     DM (diabetes mellitus)     Hyperlipidemia     Hypertension     Malignant melanoma of left lower limb, including hip     Mild CAD carotids.     PAST SURGICAL HISTORY:  H/O inguinal hernia repair     H/O shoulder surgery     History of kidney surgery remove cancer.     Social History:  · Substance use	No     Tobacco Screening:  · Core Measure Site	Yes  · Has the patient used tobacco in the past 30 days?	No    Risk Assessment:    Present on Admission:  Deep Venous Thrombosis	no  Pulmonary Embolus	no     HIV Screening:  · In accordance with [place] State law, we offer every patient who comes to our ED an HIV test. Would you like to be tested today?	Unable to answer due to medical condition/unresponsive/etc...     Hepatitis C Test Questions:  · In accordance with [place] State Law, we offer every patient a Hepatitis C test. Would you like to be tested today?	Opt out     Vitals log        ICU Vital Signs Last 24 Hrs  T(C): 36.7 (02 Aug 2024 20:36), Max: 36.7 (02 Aug 2024 20:36)  T(F): 98.1 (02 Aug 2024 20:36), Max: 98.1 (02 Aug 2024 20:36)  HR: 81 (02 Aug 2024 20:36) (59 - 81)  BP: 141/61 (02 Aug 2024 20:36) (112/56 - 141/61)  BP(mean): --  ABP: --  ABP(mean): --  RR: 18 (02 Aug 2024 20:36) (18 - 19)  SpO2: 93% (02 Aug 2024 20:36) (91% - 93%)    O2 Parameters below as of 02 Aug 2024 20:36  Patient On (Oxygen Delivery Method): room air                 Input and Output:  I&O's Detail    01 Aug 2024 07:01  -  02 Aug 2024 07:00  --------------------------------------------------------  IN:    sodium chloride 0.9%: 550 mL  Total IN: 550 mL    OUT:    Voided (mL): 200 mL  Total OUT: 200 mL    Total NET: 350 mL      02 Aug 2024 07:01  -  02 Aug 2024 20:49  --------------------------------------------------------  IN:  Total IN: 0 mL    OUT:    Voided (mL): 600 mL  Total OUT: 600 mL    Total NET: -600 mL          Lab Data                        11.5   9.07  )-----------( 400      ( 01 Aug 2024 08:08 )             32.0     08-02    x   |  x   |  x   ----------------------------<  x   x    |  x   |  0.47<L>    Ca    8.5      01 Aug 2024 08:08  Mg     1.6     08-01    TPro  5.6<L>  /  Alb  2.7<L>  /  TBili  0.6  /  DBili  0.2  /  AST  40<H>  /  ALT  25  /  AlkPhos  105  08-02            Review of Systems	  cough  weakness  malaise  congestion      Objective     Physical Examination      heart s1s2  lung dec BS  head nc  weak    Pertinent Lab findings & Imaging      Rasmussen:  NO   Adequate UO     I&O's Detail    01 Aug 2024 07:01  -  02 Aug 2024 07:00  --------------------------------------------------------  IN:    sodium chloride 0.9%: 550 mL  Total IN: 550 mL    OUT:    Voided (mL): 200 mL  Total OUT: 200 mL    Total NET: 350 mL      02 Aug 2024 07:01  -  02 Aug 2024 20:49  --------------------------------------------------------  IN:  Total IN: 0 mL    OUT:    Voided (mL): 600 mL  Total OUT: 600 mL    Total NET: -600 mL               Discussed with:     Cultures:	        Radiology      ACC: [id number] EXAM:  XR CHEST PORTABLE IMMED 1V   ORDERED BY: ANGIE AGUSTIN     PROCEDURE DATE:  07/31/2024          INTERPRETATION:  AP chest on July 31, 2024 at 11:55 AM. Patient has   sepsis.    Heart magnified by technique. There is a small left base infiltrate   increased from February 23 this year. Old fracture deformity of the left   humerus again noted.    IMPRESSION: Small left base infiltrate at this time.    --- End of Report ---            NORMAN PERRY MD; Attending Radiologist  This document has been electronically signed. Jul 31 2024 12:27PM

## 2024-08-03 LAB
ALBUMIN SERPL ELPH-MCNC: 2.8 G/DL — LOW (ref 3.3–5)
ALP SERPL-CCNC: 100 U/L — SIGNIFICANT CHANGE UP (ref 40–120)
ALT FLD-CCNC: 26 U/L — SIGNIFICANT CHANGE UP (ref 12–78)
AST SERPL-CCNC: 37 U/L — SIGNIFICANT CHANGE UP (ref 15–37)
BILIRUB DIRECT SERPL-MCNC: 0.2 MG/DL — SIGNIFICANT CHANGE UP (ref 0–0.3)
BILIRUB INDIRECT FLD-MCNC: 0.4 MG/DL — SIGNIFICANT CHANGE UP (ref 0.2–1)
BILIRUB SERPL-MCNC: 0.6 MG/DL — SIGNIFICANT CHANGE UP (ref 0.2–1.2)
CREAT SERPL-MCNC: 0.53 MG/DL — SIGNIFICANT CHANGE UP (ref 0.5–1.3)
EGFR: 94 ML/MIN/1.73M2 — SIGNIFICANT CHANGE UP
INR BLD: 1.27 RATIO — HIGH (ref 0.85–1.18)
PROT SERPL-MCNC: 5.5 G/DL — LOW (ref 6–8.3)
PROTHROM AB SERPL-ACNC: 14.8 SEC — HIGH (ref 9.5–13)

## 2024-08-03 RX ORDER — BACTERIOSTATIC SODIUM CHLORIDE 0.9 %
1000 VIAL (ML) INJECTION
Refills: 0 | Status: DISCONTINUED | OUTPATIENT
Start: 2024-08-03 | End: 2024-08-04

## 2024-08-03 RX ADMIN — Medication 4 MILLILITER(S): at 20:26

## 2024-08-03 RX ADMIN — BENZONATATE 100 MILLIGRAM(S): 100 CAPSULE, LIQUID FILLED ORAL at 06:39

## 2024-08-03 RX ADMIN — GUAIFENESIN 100 MILLIGRAM(S): 100 SOLUTION ORAL at 06:39

## 2024-08-03 RX ADMIN — Medication 2.5 MILLIGRAM(S): at 20:26

## 2024-08-03 RX ADMIN — BENZONATATE 100 MILLIGRAM(S): 100 CAPSULE, LIQUID FILLED ORAL at 22:57

## 2024-08-03 RX ADMIN — GUAIFENESIN 100 MILLIGRAM(S): 100 SOLUTION ORAL at 13:00

## 2024-08-03 RX ADMIN — ENOXAPARIN SODIUM 40 MILLIGRAM(S): 120 INJECTION SUBCUTANEOUS at 17:49

## 2024-08-03 RX ADMIN — Medication 10 MILLIGRAM(S): at 13:00

## 2024-08-03 RX ADMIN — Medication 1 DROP(S): at 12:59

## 2024-08-03 RX ADMIN — Medication 1 DROP(S): at 17:49

## 2024-08-03 RX ADMIN — BENZONATATE 100 MILLIGRAM(S): 100 CAPSULE, LIQUID FILLED ORAL at 13:00

## 2024-08-03 RX ADMIN — AMLODIPINE BESYLATE 2.5 MILLIGRAM(S): 2.5 TABLET ORAL at 22:56

## 2024-08-03 RX ADMIN — Medication 2.5 MILLIGRAM(S): at 15:47

## 2024-08-03 RX ADMIN — ATORVASTATIN CALCIUM 80 MILLIGRAM(S): 40 TABLET, FILM COATED ORAL at 22:56

## 2024-08-03 RX ADMIN — Medication 40 MILLILITER(S): at 22:56

## 2024-08-03 RX ADMIN — Medication 1 DROP(S): at 06:39

## 2024-08-03 RX ADMIN — GUAIFENESIN 100 MILLIGRAM(S): 100 SOLUTION ORAL at 17:49

## 2024-08-03 NOTE — PROGRESS NOTE ADULT - SUBJECTIVE AND OBJECTIVE BOX
[facility] of INFECTIOUS DISEASE  Harrison Crespo MD PhD, Rizwana Patel MD, Rosa Maria Jones MD, Minerva Hall MD, Azael Theodore MD  and providing coverage with Madhu Clements MD  Providing Infectious Disease Consultations at [facility], [facility], [facility]'s    Office# [phone] to schedule follow up appointments  Answering Service for urgent calls or New Consults [phone]  Cell# to text for urgent issues Harrison Crespo [phone]     infectious diseases progress note:    DUSTIN JUNIOR is a 89y y. o. Male patient    Overnight and events of the last 24hrs reviewed    Allergies    amoxicillin (Unknown)  enalapril (Unknown)    Intolerances        ANTIBIOTICS/RELEVANT:  antimicrobials    immunologic:    OTHER:  acetaminophen     Tablet .. 650 milliGRAM(s) Oral every 6 hours PRN  albuterol    0.083% 2.5 milliGRAM(s) Nebulizer every 12 hours  albuterol    0.083%. 2.5 milliGRAM(s) Nebulizer once  aluminum hydroxide/magnesium hydroxide/simethicone Suspension 30 milliLiter(s) Oral every 4 hours PRN  amLODIPine   Tablet 2.5 milliGRAM(s) Oral at bedtime  artificial tears (preservative free) Ophthalmic Solution 1 Drop(s) Both EYES four times a day  atorvastatin 80 milliGRAM(s) Oral at bedtime  benzonatate 100 milliGRAM(s) Oral three times a day  enoxaparin Injectable 40 milliGRAM(s) SubCutaneous every 24 hours  escitalopram 10 milliGRAM(s) Oral daily  guaiFENesin Oral Liquid (Sugar-Free) 100 milliGRAM(s) Oral every 6 hours  melatonin 3 milliGRAM(s) Oral at bedtime PRN  ondansetron Injectable 4 milliGRAM(s) IV Push every 8 hours PRN  sodium chloride 0.9%. 1000 milliLiter(s) IV Continuous <Continuous>  sodium chloride 3%  Inhalation 4 milliLiter(s) Inhalation every 12 hours      Objective:  Vital Signs Last 24 Hrs  T(C): 36.4 (03 Aug 2024 04:56), Max: 36.7 (02 Aug 2024 20:36)  T(F): 97.5 (03 Aug 2024 04:56), Max: 98.1 (02 Aug 2024 20:36)  HR: 77 (03 Aug 2024 04:56) (59 - 81)  BP: 130/66 (03 Aug 2024 04:56) (112/56 - 141/61)  BP(mean): --  RR: 18 (03 Aug 2024 04:56) (18 - 18)  SpO2: 93% (03 Aug 2024 04:56) (91% - 93%)    Parameters below as of 03 Aug 2024 04:56  Patient On (Oxygen Delivery Method): room air        T(C): 36.4 (08-03-24 @ 04:56), Max: 38.1 (08-01-24 @ 19:57)  T(C): 36.4 (08-03-24 @ 04:56), Max: 38.1 (08-01-24 @ 19:57)  T(C): 36.4 (08-03-24 @ 04:56), Max: 38.1 (08-01-24 @ 19:57)    PHYSICAL EXAM:  HEENT: NC atraumatic  Neck: supple  Respiratory: no accessory muscle use, breathing comfortably  Cardiovascular: distant  Gastrointestinal: normal appearing, nondistended  Extremities: no clubbing, no cyanosis,        LABS:        WBC  9.07 08-01 @ 08:08  8.05 07-31 @ 11:20      08-03    x   |  x   |  x   ----------------------------<  x   x    |  x   |  0.53      TPro  5.5<L>  /  Alb  2.8<L>  /  TBili  0.6  /  DBili  0.2  /  AST  37  /  ALT  26  /  AlkPhos  100  08-03      Creatinine: 0.53 mg/dL (08-03-24 @ 08:50)  Creatinine: 0.47 mg/dL (08-02-24 @ 10:58)  Creatinine: 0.51 mg/dL (08-01-24 @ 08:08)  Creatinine: 0.57 mg/dL (07-31-24 @ 11:20)      PT/INR - ( 03 Aug 2024 08:50 )   PT: 14.8 sec;   INR: 1.27 ratio                   INFLAMMATORY MARKERS      MICROBIOLOGY:              RADIOLOGY & ADDITIONAL STUDIES:

## 2024-08-03 NOTE — DIETITIAN INITIAL EVALUATION ADULT - PERTINENT LABORATORY DATA
08-02    x   |  x   |  x   ----------------------------<  x   x    |  x   |  0.47<L>    Ca    8.5      01 Aug 2024 08:08  Mg     1.6     08-01    TPro  5.6<L>  /  Alb  2.7<L>  /  TBili  0.6  /  DBili  0.2  /  AST  40<H>  /  ALT  25  /  AlkPhos  105  08-02

## 2024-08-03 NOTE — DIETITIAN INITIAL EVALUATION ADULT - PERTINENT MEDS FT
MEDICATIONS  (STANDING):  albuterol    0.083% 2.5 milliGRAM(s) Nebulizer every 12 hours  albuterol    0.083%. 2.5 milliGRAM(s) Nebulizer once  amLODIPine   Tablet 2.5 milliGRAM(s) Oral at bedtime  artificial tears (preservative free) Ophthalmic Solution 1 Drop(s) Both EYES four times a day  atorvastatin 80 milliGRAM(s) Oral at bedtime  benzonatate 100 milliGRAM(s) Oral three times a day  enoxaparin Injectable 40 milliGRAM(s) SubCutaneous every 24 hours  escitalopram 10 milliGRAM(s) Oral daily  guaiFENesin Oral Liquid (Sugar-Free) 100 milliGRAM(s) Oral every 6 hours  sodium chloride 3%  Inhalation 4 milliLiter(s) Inhalation every 12 hours    MEDICATIONS  (PRN):  acetaminophen     Tablet .. 650 milliGRAM(s) Oral every 6 hours PRN Temp greater or equal to 38C (100.4F), Mild Pain (1 - 3)  aluminum hydroxide/magnesium hydroxide/simethicone Suspension 30 milliLiter(s) Oral every 4 hours PRN Dyspepsia  melatonin 3 milliGRAM(s) Oral at bedtime PRN Insomnia  ondansetron Injectable 4 milliGRAM(s) IV Push every 8 hours PRN Nausea and/or Vomiting

## 2024-08-03 NOTE — PROGRESS NOTE ADULT - ASSESSMENT
89y old  Male who presents with a chief complaint of cough, not eating    weakness  frailty  anorexia  malaise  dementia  cad  covid    nebs  mucolytics  chest pt  suction PRN  ID eval noted  monitor VS and Sat  goal sat > 88 pct  assist with needs  oral hygiene  nutritional assessment  GOC - DNR DNI  prognosis guarded  COVID isol precs  cough rx regimen PRN

## 2024-08-03 NOTE — DIETITIAN INITIAL EVALUATION ADULT - NSFNSPHYEXAMSKINFT_GEN_A_CORE
Pressure Injury 1: Right:, heel, Stage I  Pressure Injury 2: Left:, heel, Stage I  Pressure Injury 3: Right:, foot, Stage I  Pressure Injury 4: Right:, fifth toe, Unstageable  Pressure Injury 5: none, none  Pressure Injury 6: none, none  Pressure Injury 7: none, none  Pressure Injury 8: none, none  Pressure Injury 9: none, none  Pressure Injury 10: none, none  Pressure Injury 11: none, none Pressure Injury 1: Right:, heel, Stage I  Pressure Injury 2: Left:, heel, Stage I  Pressure Injury 3: Right:, foot, Stage I  Pressure Injury 4: Right:, fifth toe, Unstageable

## 2024-08-03 NOTE — PROGRESS NOTE ADULT - SUBJECTIVE AND OBJECTIVE BOX
Date of Service: 08-03-24 @ 08:05    Patient is a 89y old  Male who presents with a chief complaint of Pneumonia due to infectious organism     (03 Aug 2024 08:03)      INTERVAL HPI/OVERNIGHT EVENTS: Patient seen and examined. NAD. No complaints.    Vital Signs Last 24 Hrs  T(C): 36.4 (03 Aug 2024 04:56), Max: 36.7 (02 Aug 2024 20:36)  T(F): 97.5 (03 Aug 2024 04:56), Max: 98.1 (02 Aug 2024 20:36)  HR: 77 (03 Aug 2024 04:56) (59 - 81)  BP: 130/66 (03 Aug 2024 04:56) (112/56 - 141/61)  BP(mean): --  RR: 18 (03 Aug 2024 04:56) (18 - 18)  SpO2: 93% (03 Aug 2024 04:56) (91% - 93%)    Parameters below as of 03 Aug 2024 04:56  Patient On (Oxygen Delivery Method): room air        08-02    x   |  x   |  x   ----------------------------<  x   x    |  x   |  0.47<L>    Ca    8.5      01 Aug 2024 08:08  Mg     1.6     08-01    TPro  5.6<L>  /  Alb  2.7<L>  /  TBili  0.6  /  DBili  0.2  /  AST  40<H>  /  ALT  25  /  AlkPhos  105  08-02                          11.5   9.07  )-----------( 400      ( 01 Aug 2024 08:08 )             32.0     PT/INR - ( 02 Aug 2024 10:58 )   PT: 14.0 sec;   INR: 1.20 ratio           CAPILLARY BLOOD GLUCOSE        Urinalysis Basic - ( 01 Aug 2024 08:08 )    Color: x / Appearance: x / SG: x / pH: x  Gluc: 166 mg/dL / Ketone: x  / Bili: x / Urobili: x   Blood: x / Protein: x / Nitrite: x   Leuk Esterase: x / RBC: x / WBC x   Sq Epi: x / Non Sq Epi: x / Bacteria: x              acetaminophen     Tablet .. 650 milliGRAM(s) Oral every 6 hours PRN  albuterol    0.083% 2.5 milliGRAM(s) Nebulizer every 12 hours  albuterol    0.083%. 2.5 milliGRAM(s) Nebulizer once  aluminum hydroxide/magnesium hydroxide/simethicone Suspension 30 milliLiter(s) Oral every 4 hours PRN  amLODIPine   Tablet 2.5 milliGRAM(s) Oral at bedtime  artificial tears (preservative free) Ophthalmic Solution 1 Drop(s) Both EYES four times a day  atorvastatin 80 milliGRAM(s) Oral at bedtime  benzonatate 100 milliGRAM(s) Oral three times a day  enoxaparin Injectable 40 milliGRAM(s) SubCutaneous every 24 hours  escitalopram 10 milliGRAM(s) Oral daily  guaiFENesin Oral Liquid (Sugar-Free) 100 milliGRAM(s) Oral every 6 hours  melatonin 3 milliGRAM(s) Oral at bedtime PRN  ondansetron Injectable 4 milliGRAM(s) IV Push every 8 hours PRN  sodium chloride 3%  Inhalation 4 milliLiter(s) Inhalation every 12 hours              REVIEW OF SYSTEMS:  CONSTITUTIONAL: No fever, no weight loss, or no fatigue  NECK: No pain, no stiffness  RESPIRATORY: less cough, no wheezing, no chills, no hemoptysis, No shortness of breath  CARDIOVASCULAR: No chest pain, no palpitations, no dizziness, no leg swelling  GASTROINTESTINAL: No abdominal pain. No nausea, no vomiting, no hematemesis; No diarrhea, no constipation. No melena, no hematochezia.  GENITOURINARY: No dysuria, no frequency, no hematuria, no incontinence  NEUROLOGICAL: No headaches, no loss of strength, no numbness, no tremors  SKIN: No itching, no burning  MUSCULOSKELETAL: No joint pain, no swelling; No muscle, no back, no extremity pain  PSYCHIATRIC: No depression, no mood swings,   HEME/LYMPH: No easy bruising, no bleeding gums  ALLERY AND IMMUNOLOGIC: No hives       Consultant(s) Notes Reviewed:  [X] YES  [ ] NO    PHYSICAL EXAM:  GENERAL: NAD  HEAD:  Atraumatic, Normocephalic  EYES: EOMI, PERRLA, conjunctiva and sclera clear  ENMT: No tonsillar erythema, exudates, or enlargement; Moist mucous membranes  NECK: Supple, No JVD  NERVOUS SYSTEM:  arousable  CHEST/LUNG: Clear to auscultation bilaterally; No rales, rhonchi, wheezing,  HEART: Regular rate and rhythm  ABDOMEN: Soft, Nontender, Nondistended; Bowel sounds present  EXTREMITIES:  No clubbing, cyanosis, or edema  LYMPH: No lymphadenopathy noted  SKIN: No rashes      Advanced care planning discussed with patient/family [X] YES   [ ] NO    Advanced care planning discussed with patient/family. Patient's health status was discussed. All appropriate changes have been made regarding patient's end-of-life care. Advanced care planning forms reviewed/discussed/completed.  20 minutes spent.

## 2024-08-03 NOTE — DIETITIAN INITIAL EVALUATION ADULT - PROBLEM SELECTOR PLAN 1
Admit  Airborne precautions  Start remdesivir x 3days   Lovenox 40mg sq qday  Continuous pulse oximetry  Oxygen if needed for SaO2 < 90%  Start decadron if patient becomes hypoxic  Cough suppressants prn  Monitor COVID serologies  Monitor off abx  Tylenol prn for fever  ID consult  Further work-up/management pending clinical course.

## 2024-08-03 NOTE — DIETITIAN INITIAL EVALUATION ADULT - OTHER INFO
89y old  Male with pmxh of HTN, HLD , anxiety , DM, dementia , malignant melanoma of left lower limb, including hip, mild CAD carotids, s/p kidney surgery to remove cancer, s/p inguinal hernia repair who presents with a chief complaint of cough, not eating. Pt found to be covid +. RD notes SLP unable to assess swallow function due to physical barriers ( pt was receiving hygiene care and will re-attempt ) . Thus far pt has not been evaluated,    At time of RD visit to pts bedside, sleeping, family not in attendance. Pt appears frail, thin . Per nsg, pt eating poorly despite assistance and encouragement .       H/O shoulder surger

## 2024-08-03 NOTE — PROGRESS NOTE ADULT - SUBJECTIVE AND OBJECTIVE BOX
Date/Time Patient Seen:  		  Referring MD:   Data Reviewed	       Patient is a 89y old  Male who presents with a chief complaint of cough, not eating (02 Aug 2024 20:48)      Subjective/HPI     PAST MEDICAL & SURGICAL HISTORY:  Hypertension    Hyperlipidemia    Anxiety    DM (diabetes mellitus)    Dementia    Malignant melanoma of left lower limb, including hip    Mild CAD  carotids    Cancer of skin    No significant past surgical history    History of kidney surgery  remove cancer    H/O inguinal hernia repair    H/O shoulder surgery          Medication list         MEDICATIONS  (STANDING):  albuterol    0.083% 2.5 milliGRAM(s) Nebulizer every 12 hours  albuterol    0.083%. 2.5 milliGRAM(s) Nebulizer once  amLODIPine   Tablet 2.5 milliGRAM(s) Oral at bedtime  artificial tears (preservative free) Ophthalmic Solution 1 Drop(s) Both EYES four times a day  atorvastatin 80 milliGRAM(s) Oral at bedtime  benzonatate 100 milliGRAM(s) Oral three times a day  enoxaparin Injectable 40 milliGRAM(s) SubCutaneous every 24 hours  escitalopram 10 milliGRAM(s) Oral daily  guaiFENesin Oral Liquid (Sugar-Free) 100 milliGRAM(s) Oral every 6 hours  sodium chloride 3%  Inhalation 4 milliLiter(s) Inhalation every 12 hours    MEDICATIONS  (PRN):  acetaminophen     Tablet .. 650 milliGRAM(s) Oral every 6 hours PRN Temp greater or equal to 38C (100.4F), Mild Pain (1 - 3)  aluminum hydroxide/magnesium hydroxide/simethicone Suspension 30 milliLiter(s) Oral every 4 hours PRN Dyspepsia  melatonin 3 milliGRAM(s) Oral at bedtime PRN Insomnia  ondansetron Injectable 4 milliGRAM(s) IV Push every 8 hours PRN Nausea and/or Vomiting         Vitals log        ICU Vital Signs Last 24 Hrs  T(C): 36.4 (03 Aug 2024 04:56), Max: 36.7 (02 Aug 2024 20:36)  T(F): 97.5 (03 Aug 2024 04:56), Max: 98.1 (02 Aug 2024 20:36)  HR: 77 (03 Aug 2024 04:56) (59 - 81)  BP: 130/66 (03 Aug 2024 04:56) (112/56 - 141/61)  BP(mean): --  ABP: --  ABP(mean): --  RR: 18 (03 Aug 2024 04:56) (18 - 18)  SpO2: 93% (03 Aug 2024 04:56) (91% - 93%)    O2 Parameters below as of 03 Aug 2024 04:56  Patient On (Oxygen Delivery Method): room air                 Input and Output:  I&O's Detail    02 Aug 2024 07:01  -  03 Aug 2024 07:00  --------------------------------------------------------  IN:  Total IN: 0 mL    OUT:    Voided (mL): 600 mL  Total OUT: 600 mL    Total NET: -600 mL          Lab Data                        11.5   9.07  )-----------( 400      ( 01 Aug 2024 08:08 )             32.0     08-02    x   |  x   |  x   ----------------------------<  x   x    |  x   |  0.47<L>    Ca    8.5      01 Aug 2024 08:08  Mg     1.6     08-01    TPro  5.6<L>  /  Alb  2.7<L>  /  TBili  0.6  /  DBili  0.2  /  AST  40<H>  /  ALT  25  /  AlkPhos  105  08-02            Review of Systems	      Objective     Physical Examination  heart s1s2  lung dec BS  head nc  head at        Pertinent Lab findings & Imaging      Valery:  NO   Adequate UO     I&O's Detail    02 Aug 2024 07:01  -  03 Aug 2024 07:00  --------------------------------------------------------  IN:  Total IN: 0 mL    OUT:    Voided (mL): 600 mL  Total OUT: 600 mL    Total NET: -600 mL               Discussed with:     Cultures:	        Radiology

## 2024-08-03 NOTE — DIETITIAN NUTRITION RISK NOTIFICATION - TREATMENT: THE FOLLOWING DIET HAS BEEN RECOMMENDED
Diet, DASH/TLC:   Sodium & Cholesterol Restricted  Minced and Moist (MINCEDMOIST) (08-01-24 @ 11:22) [Active]    with breakfast Mighty Shake supplement (200 calories, 6 gm protein)   with dinner magic cup ( 290 kcals and 9 gm protein )

## 2024-08-03 NOTE — DIETITIAN INITIAL EVALUATION ADULT - ADD RECOMMEND
1) SLP to evaluate swallow function asap 2) full supervision with meals/aspiration precautions  2) RD will provide Mighty Shake supplement (200 calories, 6 gm protein) with bkfst and magic cup ( fortified daily dessert 290 kcals and 9 gm pro/4 oz) with dinner

## 2024-08-03 NOTE — PROGRESS NOTE ADULT - ASSESSMENT
89 y/o male with dementia, htn, DM-II and CAD presented with acute COVID of <1 week duration prior to admission    RECOMMENDATIONS  COVID-19  sp Remdesivir x 3 days 7/31-8/2  avoid steroids  no clear indication for abx so obs off abx  avoid steroids  -remains on room air and very somnolent    other issues will coordinate with medicine    Thank you for consulting us and involving us in the management of this most interesting and challenging case.  Please call us at [phone] or text me directly on my cell#[phone] with any concerns or further questions.    Thank you for consulting us and involving us in the management of this most interesting and challenging case.  We will follow along in the care of this patient. Please call us at [phone] or text me directly on my cell# at [phone] with any concerns.

## 2024-08-04 LAB
ALBUMIN SERPL ELPH-MCNC: 2.8 G/DL — LOW (ref 3.3–5)
ALP SERPL-CCNC: 101 U/L — SIGNIFICANT CHANGE UP (ref 40–120)
ALT FLD-CCNC: 27 U/L — SIGNIFICANT CHANGE UP (ref 12–78)
ANION GAP SERPL CALC-SCNC: 8 MMOL/L — SIGNIFICANT CHANGE UP (ref 5–17)
AST SERPL-CCNC: 31 U/L — SIGNIFICANT CHANGE UP (ref 15–37)
BILIRUB DIRECT SERPL-MCNC: 0.2 MG/DL — SIGNIFICANT CHANGE UP (ref 0–0.3)
BILIRUB INDIRECT FLD-MCNC: 0.5 MG/DL — SIGNIFICANT CHANGE UP (ref 0.2–1)
BILIRUB SERPL-MCNC: 0.7 MG/DL — SIGNIFICANT CHANGE UP (ref 0.2–1.2)
BUN SERPL-MCNC: 25 MG/DL — HIGH (ref 7–23)
CALCIUM SERPL-MCNC: 8.1 MG/DL — LOW (ref 8.5–10.1)
CHLORIDE SERPL-SCNC: 107 MMOL/L — SIGNIFICANT CHANGE UP (ref 96–108)
CO2 SERPL-SCNC: 27 MMOL/L — SIGNIFICANT CHANGE UP (ref 22–31)
CREAT SERPL-MCNC: 0.54 MG/DL — SIGNIFICANT CHANGE UP (ref 0.5–1.3)
EGFR: 94 ML/MIN/1.73M2 — SIGNIFICANT CHANGE UP
GLUCOSE SERPL-MCNC: 202 MG/DL — HIGH (ref 70–99)
HCT VFR BLD CALC: 32.1 % — LOW (ref 39–50)
HGB BLD-MCNC: 11.3 G/DL — LOW (ref 13–17)
INR BLD: 1.52 RATIO — HIGH (ref 0.85–1.18)
MAGNESIUM SERPL-MCNC: 1.6 MG/DL — SIGNIFICANT CHANGE UP (ref 1.6–2.6)
MCHC RBC-ENTMCNC: 35.2 GM/DL — SIGNIFICANT CHANGE UP (ref 32–36)
MCHC RBC-ENTMCNC: 36.8 PG — HIGH (ref 27–34)
MCV RBC AUTO: 104.6 FL — HIGH (ref 80–100)
NRBC # BLD: 0 /100 WBCS — SIGNIFICANT CHANGE UP (ref 0–0)
NT-PROBNP SERPL-SCNC: 1137 PG/ML — HIGH (ref 0–450)
PLATELET # BLD AUTO: 477 K/UL — HIGH (ref 150–400)
POTASSIUM SERPL-MCNC: 3.5 MMOL/L — SIGNIFICANT CHANGE UP (ref 3.5–5.3)
POTASSIUM SERPL-SCNC: 3.5 MMOL/L — SIGNIFICANT CHANGE UP (ref 3.5–5.3)
PROCALCITONIN SERPL-MCNC: 0.1 NG/ML — HIGH
PROT SERPL-MCNC: 5.5 G/DL — LOW (ref 6–8.3)
PROTHROM AB SERPL-ACNC: 17.6 SEC — HIGH (ref 9.5–13)
RBC # BLD: 3.07 M/UL — LOW (ref 4.2–5.8)
RBC # FLD: 12.9 % — SIGNIFICANT CHANGE UP (ref 10.3–14.5)
SODIUM SERPL-SCNC: 142 MMOL/L — SIGNIFICANT CHANGE UP (ref 135–145)
WBC # BLD: 15.98 K/UL — HIGH (ref 3.8–10.5)
WBC # FLD AUTO: 15.98 K/UL — HIGH (ref 3.8–10.5)

## 2024-08-04 PROCEDURE — 99222 1ST HOSP IP/OBS MODERATE 55: CPT

## 2024-08-04 PROCEDURE — 71045 X-RAY EXAM CHEST 1 VIEW: CPT | Mod: 26

## 2024-08-04 RX ADMIN — ENOXAPARIN SODIUM 40 MILLIGRAM(S): 120 INJECTION SUBCUTANEOUS at 18:29

## 2024-08-04 RX ADMIN — Medication 1 DROP(S): at 07:00

## 2024-08-04 RX ADMIN — BENZONATATE 100 MILLIGRAM(S): 100 CAPSULE, LIQUID FILLED ORAL at 23:15

## 2024-08-04 RX ADMIN — Medication 2.5 MILLIGRAM(S): at 20:01

## 2024-08-04 RX ADMIN — GUAIFENESIN 100 MILLIGRAM(S): 100 SOLUTION ORAL at 23:15

## 2024-08-04 RX ADMIN — AMLODIPINE BESYLATE 2.5 MILLIGRAM(S): 2.5 TABLET ORAL at 23:15

## 2024-08-04 RX ADMIN — Medication 1 DROP(S): at 13:08

## 2024-08-04 RX ADMIN — ATORVASTATIN CALCIUM 80 MILLIGRAM(S): 40 TABLET, FILM COATED ORAL at 23:15

## 2024-08-04 RX ADMIN — Medication 4 MILLILITER(S): at 07:42

## 2024-08-04 RX ADMIN — Medication 4 MILLILITER(S): at 20:01

## 2024-08-04 RX ADMIN — Medication 1 DROP(S): at 23:15

## 2024-08-04 RX ADMIN — BENZONATATE 100 MILLIGRAM(S): 100 CAPSULE, LIQUID FILLED ORAL at 06:59

## 2024-08-04 RX ADMIN — GUAIFENESIN 100 MILLIGRAM(S): 100 SOLUTION ORAL at 06:59

## 2024-08-04 RX ADMIN — GUAIFENESIN 100 MILLIGRAM(S): 100 SOLUTION ORAL at 13:08

## 2024-08-04 RX ADMIN — Medication 2.5 MILLIGRAM(S): at 07:42

## 2024-08-04 RX ADMIN — GUAIFENESIN 100 MILLIGRAM(S): 100 SOLUTION ORAL at 18:29

## 2024-08-04 NOTE — PROGRESS NOTE ADULT - SUBJECTIVE AND OBJECTIVE BOX
Date of Service: 08-04-24 @ 09:50    Patient is a 89y old  Male who presents with a chief complaint of cough, not eating (04 Aug 2024 07:19)      INTERVAL HPI/OVERNIGHT EVENTS: Patient seen and examined. NAD. No complaints. More awake.    Vital Signs Last 24 Hrs  T(C): 36.6 (04 Aug 2024 06:15), Max: 36.9 (03 Aug 2024 20:38)  T(F): 97.9 (04 Aug 2024 06:15), Max: 98.5 (03 Aug 2024 20:38)  HR: 78 (04 Aug 2024 08:13) (77 - 87)  BP: 139/71 (04 Aug 2024 06:15) (124/63 - 139/71)  BP(mean): --  RR: 18 (04 Aug 2024 06:15) (18 - 18)  SpO2: 93% (04 Aug 2024 08:13) (92% - 93%)    Parameters below as of 04 Aug 2024 08:13  Patient On (Oxygen Delivery Method): room air        08-04    142  |  107  |  25<H>  ----------------------------<  202<H>  3.5   |  27  |  0.54    Ca    8.1<L>      04 Aug 2024 06:22  Mg     1.6     08-04    TPro  5.5<L>  /  Alb  2.8<L>  /  TBili  0.7  /  DBili  0.2  /  AST  31  /  ALT  27  /  AlkPhos  101  08-04                          11.3   15.98 )-----------( 477      ( 04 Aug 2024 06:22 )             32.1     PT/INR - ( 04 Aug 2024 06:22 )   PT: 17.6 sec;   INR: 1.52 ratio           CAPILLARY BLOOD GLUCOSE        Urinalysis Basic - ( 04 Aug 2024 06:22 )    Color: x / Appearance: x / SG: x / pH: x  Gluc: 202 mg/dL / Ketone: x  / Bili: x / Urobili: x   Blood: x / Protein: x / Nitrite: x   Leuk Esterase: x / RBC: x / WBC x   Sq Epi: x / Non Sq Epi: x / Bacteria: x              acetaminophen     Tablet .. 650 milliGRAM(s) Oral every 6 hours PRN  albuterol    0.083% 2.5 milliGRAM(s) Nebulizer every 12 hours  aluminum hydroxide/magnesium hydroxide/simethicone Suspension 30 milliLiter(s) Oral every 4 hours PRN  amLODIPine   Tablet 2.5 milliGRAM(s) Oral at bedtime  artificial tears (preservative free) Ophthalmic Solution 1 Drop(s) Both EYES four times a day  atorvastatin 80 milliGRAM(s) Oral at bedtime  benzonatate 100 milliGRAM(s) Oral three times a day  enoxaparin Injectable 40 milliGRAM(s) SubCutaneous every 24 hours  escitalopram 10 milliGRAM(s) Oral daily  guaiFENesin Oral Liquid (Sugar-Free) 100 milliGRAM(s) Oral every 6 hours  melatonin 3 milliGRAM(s) Oral at bedtime PRN  ondansetron Injectable 4 milliGRAM(s) IV Push every 8 hours PRN  sodium chloride 0.9%. 1000 milliLiter(s) IV Continuous <Continuous>  sodium chloride 3%  Inhalation 4 milliLiter(s) Inhalation every 12 hours              REVIEW OF SYSTEMS:  CONSTITUTIONAL: No fever, no weight loss, or no fatigue  NECK: No pain, no stiffness  RESPIRATORY: No cough, no wheezing, no chills, no hemoptysis, No shortness of breath  CARDIOVASCULAR: No chest pain, no palpitations, no dizziness, no leg swelling  GASTROINTESTINAL: No abdominal pain. No nausea, no vomiting, no hematemesis; No diarrhea, no constipation. No melena, no hematochezia.  GENITOURINARY: No dysuria, no frequency, no hematuria, no incontinence  NEUROLOGICAL: No headaches, no loss of strength, no numbness, no tremors  SKIN: No itching, no burning  MUSCULOSKELETAL: No joint pain, no swelling; No muscle, no back, no extremity pain  PSYCHIATRIC: No depression, no mood swings,   HEME/LYMPH: No easy bruising, no bleeding gums  ALLERY AND IMMUNOLOGIC: No hives       Consultant(s) Notes Reviewed:  [X] YES  [ ] NO    PHYSICAL EXAM:  GENERAL: NAD  HEAD:  Atraumatic, Normocephalic  EYES: EOMI, PERRLA, conjunctiva and sclera clear  ENMT: No tonsillar erythema, exudates, or enlargement; Moist mucous membranes  NECK: Supple, No JVD  NERVOUS SYSTEM:  more awake  CHEST/LUNG: Clear to auscultation bilaterally; No rales, rhonchi, wheezing,  HEART: Regular rate and rhythm  ABDOMEN: Soft, Nontender, Nondistended; Bowel sounds present  EXTREMITIES:  No clubbing, cyanosis, or edema  LYMPH: No lymphadenopathy noted  SKIN: No rashes      Advanced care planning discussed with patient/family [X] YES   [ ] NO    Advanced care planning discussed with patient/family. Patient's health status was discussed. All appropriate changes have been made regarding patient's end-of-life care. Advanced care planning forms reviewed/discussed/completed.  20 minutes spent.

## 2024-08-04 NOTE — CASE MANAGEMENT PROGRESS NOTE - NSCMPROGRESSNOTE_GEN_ALL_CORE
Weekend CM task noted. Patient DC cancelled. WBC 15.85; chest x-ray ordered. Anticipated DC plan for patient to return home with private hire aides and spouse, and [facility] services when medically stable. CM will continue to follow.

## 2024-08-04 NOTE — CONSULT NOTE ADULT - SUBJECTIVE AND OBJECTIVE BOX
DOCUMENTATION IN PROGRESS    CHIEF COMPLAINT: Patient is a 89y old  Male who presents with a chief complaint of cough, not eating (04 Aug 2024 10:18)      HPI:  This is a 89 y/o male with a PMHx of dementia presented to the ED with decreased food and water intake since yesterday. Pt's wife is at bedside. Pt's wife reports the pt has been coughing since yesterday and reports that "there is phlegm stuck in his throat" but he has not expectorated anything. Pt's wife denies pt having a fever, chills, pain, and N/V/D/C. Wife states pt usually eats regular meals but starting yesterday he has only eaten a "fruit cocktail and some chocolate yesterday" and "cheerios this morning." Wife tried giving him chicken and fish but he "spit it out." Wife says pt has only had a sip of water since yesterday. Pt was diagnosed with COVID-19 this morning and was brought from home. Wife says pt is at his mental baseline. Wife says he can eat independently but requires help with a Home Health Aide and wife for other ADLs. Wife denies recent travel history but + sick contacts (wife had COVID last week). Patient has HHA 16hours/day. Patient is bedbound. (31 Jul 2024 13:47)      EKG: SR with 1st deg AVB     REVIEW OF SYSTEMS:   All other review of systems are negative unless indicated above    PAST MEDICAL & SURGICAL HISTORY:  Hypertension      Hyperlipidemia      Anxiety      DM (diabetes mellitus)      Dementia      Malignant melanoma of left lower limb, including hip      Mild CAD  carotids      Cancer of skin      History of kidney surgery  remove cancer      H/O inguinal hernia repair      H/O shoulder surgery          SOCIAL HISTORY:  No tobacco, ethanol, or drug abuse.    FAMILY HISTORY:    No family history of acute MI or sudden cardiac death.    MEDICATIONS  (STANDING):  albuterol    0.083% 2.5 milliGRAM(s) Nebulizer every 12 hours  amLODIPine   Tablet 2.5 milliGRAM(s) Oral at bedtime  artificial tears (preservative free) Ophthalmic Solution 1 Drop(s) Both EYES four times a day  atorvastatin 80 milliGRAM(s) Oral at bedtime  benzonatate 100 milliGRAM(s) Oral three times a day  enoxaparin Injectable 40 milliGRAM(s) SubCutaneous every 24 hours  escitalopram 10 milliGRAM(s) Oral daily  guaiFENesin Oral Liquid (Sugar-Free) 100 milliGRAM(s) Oral every 6 hours  sodium chloride 3%  Inhalation 4 milliLiter(s) Inhalation every 12 hours    MEDICATIONS  (PRN):  acetaminophen     Tablet .. 650 milliGRAM(s) Oral every 6 hours PRN Temp greater or equal to 38C (100.4F), Mild Pain (1 - 3)  aluminum hydroxide/magnesium hydroxide/simethicone Suspension 30 milliLiter(s) Oral every 4 hours PRN Dyspepsia  melatonin 3 milliGRAM(s) Oral at bedtime PRN Insomnia  ondansetron Injectable 4 milliGRAM(s) IV Push every 8 hours PRN Nausea and/or Vomiting      Allergies    amoxicillin (Unknown)  enalapril (Unknown)    Intolerances        Home meds:  Home Medications:  amLODIPine 2.5 mg oral tablet: 1 tab(s) orally once a day (at bedtime) (31 Jul 2024 14:19)  fluvastatin 80 mg oral tablet, extended release: 1 tab(s) orally once a day (31 Jul 2024 14:19)  Lexapro 10 mg oral tablet: 1 tab(s) orally once a day (31 Jul 2024 14:19)        VITAL SIGNS:   Vital Signs Last 24 Hrs  T(C): 36.6 (04 Aug 2024 06:15), Max: 36.9 (03 Aug 2024 20:38)  T(F): 97.9 (04 Aug 2024 06:15), Max: 98.5 (03 Aug 2024 20:38)  HR: 78 (04 Aug 2024 08:13) (77 - 87)  BP: 139/71 (04 Aug 2024 06:15) (124/63 - 139/71)  BP(mean): --  RR: 18 (04 Aug 2024 06:15) (18 - 18)  SpO2: 93% (04 Aug 2024 08:13) (92% - 93%)    Parameters below as of 04 Aug 2024 08:13  Patient On (Oxygen Delivery Method): room air        I&O's Summary    03 Aug 2024 07:01  -  04 Aug 2024 07:00  --------------------------------------------------------  IN: 440 mL / OUT: 0 mL / NET: 440 mL        On Exam:  TELE: Not on telemetry   Constitutional: NAD  HEENT: Moist Mucous Membranes, Anicteric  Pulmonary: Non-labored, breath sounds are clear bilaterally, No wheezing, rales or rhonchi  Cardiovascular: Regular, S1 and S2, No murmurs, rubs, gallops or clicks  Gastrointestinal: Bowel Sounds present, soft, nontender.   Lymph: No peripheral edema. No lymphadenopathy.  Skin: No visible rashes or ulcers.  Psych:  Mood & affect appropriate for situation    LABS: All Labs Reviewed:                        11.3   15.98 )-----------( 477      ( 04 Aug 2024 06:22 )             32.1     04 Aug 2024 06:22    142    |  107    |  25     ----------------------------<  202    3.5     |  27     |  0.54   03 Aug 2024 08:50    x      |  x      |  x      ----------------------------<  x      x       |  x      |  0.53   02 Aug 2024 10:58    x      |  x      |  x      ----------------------------<  x      x       |  x      |  0.47     Ca    8.1        04 Aug 2024 06:22  Mg     1.6       04 Aug 2024 06:22    TPro  5.5    /  Alb  2.8    /  TBili  0.7    /  DBili  0.2    /  AST  31     /  ALT  27     /  AlkPhos  101    04 Aug 2024 06:22  TPro  5.5    /  Alb  2.8    /  TBili  0.6    /  DBili  0.2    /  AST  37     /  ALT  26     /  AlkPhos  100    03 Aug 2024 08:50  TPro  5.6    /  Alb  2.7    /  TBili  0.6    /  DBili  0.2    /  AST  40     /  ALT  25     /  AlkPhos  105    02 Aug 2024 10:58    PT/INR - ( 04 Aug 2024 06:22 )   PT: 17.6 sec;   INR: 1.52 ratio               Blood Culture: Organism --  Gram Stain Blood -- Gram Stain --  Specimen Source .Blood Blood-Peripheral  Culture-Blood --    Organism --  Gram Stain Blood -- Gram Stain --  Specimen Source .Blood Blood-Peripheral  Culture-Blood --         CHIEF COMPLAINT: Patient is a 89y old  Male who presents with a chief complaint of cough, not eating (04 Aug 2024 10:18)      HPI:  This is a 89 y/o male with a PMHx of dementia presented to the ED with decreased food and water intake since yesterday. Pt's wife is at bedside. Pt's wife reports the pt has been coughing since yesterday and reports that "there is phlegm stuck in his throat" but he has not expectorated anything. Pt's wife denies pt having a fever, chills, pain, and N/V/D/C. Wife states pt usually eats regular meals but starting yesterday he has only eaten a "fruit cocktail and some chocolate yesterday" and "cheerios this morning." Wife tried giving him chicken and fish but he "spit it out." Wife says pt has only had a sip of water since yesterday. Pt was diagnosed with COVID-19 this morning and was brought from home. Wife says pt is at his mental baseline. Wife says he can eat independently but requires help with a Home Health Aide and wife for other ADLs. Wife denies recent travel history but + sick contacts (wife had COVID last week). Patient has HHA 16hours/day. Patient is bedbound. (31 Jul 2024 13:47)      EKG: SR with 1st deg AVB     REVIEW OF SYSTEMS:   All other review of systems are negative unless indicated above    PAST MEDICAL & SURGICAL HISTORY:  Hypertension      Hyperlipidemia      Anxiety      DM (diabetes mellitus)      Dementia      Malignant melanoma of left lower limb, including hip      Mild CAD  carotids      Cancer of skin      History of kidney surgery  remove cancer      H/O inguinal hernia repair      H/O shoulder surgery          SOCIAL HISTORY:  No tobacco, ethanol, or drug abuse.    FAMILY HISTORY:    No family history of acute MI or sudden cardiac death.    MEDICATIONS  (STANDING):  albuterol    0.083% 2.5 milliGRAM(s) Nebulizer every 12 hours  amLODIPine   Tablet 2.5 milliGRAM(s) Oral at bedtime  artificial tears (preservative free) Ophthalmic Solution 1 Drop(s) Both EYES four times a day  atorvastatin 80 milliGRAM(s) Oral at bedtime  benzonatate 100 milliGRAM(s) Oral three times a day  enoxaparin Injectable 40 milliGRAM(s) SubCutaneous every 24 hours  escitalopram 10 milliGRAM(s) Oral daily  guaiFENesin Oral Liquid (Sugar-Free) 100 milliGRAM(s) Oral every 6 hours  sodium chloride 3%  Inhalation 4 milliLiter(s) Inhalation every 12 hours    MEDICATIONS  (PRN):  acetaminophen     Tablet .. 650 milliGRAM(s) Oral every 6 hours PRN Temp greater or equal to 38C (100.4F), Mild Pain (1 - 3)  aluminum hydroxide/magnesium hydroxide/simethicone Suspension 30 milliLiter(s) Oral every 4 hours PRN Dyspepsia  melatonin 3 milliGRAM(s) Oral at bedtime PRN Insomnia  ondansetron Injectable 4 milliGRAM(s) IV Push every 8 hours PRN Nausea and/or Vomiting      Allergies    amoxicillin (Unknown)  enalapril (Unknown)    Intolerances        Home meds:  Home Medications:  amLODIPine 2.5 mg oral tablet: 1 tab(s) orally once a day (at bedtime) (31 Jul 2024 14:19)  fluvastatin 80 mg oral tablet, extended release: 1 tab(s) orally once a day (31 Jul 2024 14:19)  Lexapro 10 mg oral tablet: 1 tab(s) orally once a day (31 Jul 2024 14:19)        VITAL SIGNS:   Vital Signs Last 24 Hrs  T(C): 36.6 (04 Aug 2024 06:15), Max: 36.9 (03 Aug 2024 20:38)  T(F): 97.9 (04 Aug 2024 06:15), Max: 98.5 (03 Aug 2024 20:38)  HR: 78 (04 Aug 2024 08:13) (77 - 87)  BP: 139/71 (04 Aug 2024 06:15) (124/63 - 139/71)  BP(mean): --  RR: 18 (04 Aug 2024 06:15) (18 - 18)  SpO2: 93% (04 Aug 2024 08:13) (92% - 93%)    Parameters below as of 04 Aug 2024 08:13  Patient On (Oxygen Delivery Method): room air        I&O's Summary    03 Aug 2024 07:01  -  04 Aug 2024 07:00  --------------------------------------------------------  IN: 440 mL / OUT: 0 mL / NET: 440 mL        On Exam:  TELE: Not on telemetry   Constitutional: NAD  HEENT: Moist Mucous Membranes, Anicteric, frail   Pulmonary: Non-labored, breath sounds are clear bilaterally, No wheezing, rales or rhonchi  Cardiovascular: Regular, S1 and S2, + murmurs, rubs, gallops or clicks  Gastrointestinal: Bowel Sounds present, soft, nontender.   Lymph: No peripheral edema. No lymphadenopathy.  Skin: No visible rashes or ulcers.  Psych:  Mood & affect appropriate for situation    LABS: All Labs Reviewed:                        11.3   15.98 )-----------( 477      ( 04 Aug 2024 06:22 )             32.1     04 Aug 2024 06:22    142    |  107    |  25     ----------------------------<  202    3.5     |  27     |  0.54   03 Aug 2024 08:50    x      |  x      |  x      ----------------------------<  x      x       |  x      |  0.53   02 Aug 2024 10:58    x      |  x      |  x      ----------------------------<  x      x       |  x      |  0.47     Ca    8.1        04 Aug 2024 06:22  Mg     1.6       04 Aug 2024 06:22    TPro  5.5    /  Alb  2.8    /  TBili  0.7    /  DBili  0.2    /  AST  31     /  ALT  27     /  AlkPhos  101    04 Aug 2024 06:22  TPro  5.5    /  Alb  2.8    /  TBili  0.6    /  DBili  0.2    /  AST  37     /  ALT  26     /  AlkPhos  100    03 Aug 2024 08:50  TPro  5.6    /  Alb  2.7    /  TBili  0.6    /  DBili  0.2    /  AST  40     /  ALT  25     /  AlkPhos  105    02 Aug 2024 10:58    PT/INR - ( 04 Aug 2024 06:22 )   PT: 17.6 sec;   INR: 1.52 ratio               Blood Culture: Organism --  Gram Stain Blood -- Gram Stain --  Specimen Source .Blood Blood-Peripheral  Culture-Blood --    Organism --  Gram Stain Blood -- Gram Stain --  Specimen Source .Blood Blood-Peripheral  Culture-Blood --

## 2024-08-04 NOTE — PROGRESS NOTE ADULT - SUBJECTIVE AND OBJECTIVE BOX
Date/Time Patient Seen:  		  Referring MD:   Data Reviewed	       Patient is a 89y old  Male who presents with a chief complaint of cough, not eating (03 Aug 2024 10:51)      Subjective/HPI     PAST MEDICAL & SURGICAL HISTORY:  Hypertension    Hyperlipidemia    Anxiety    DM (diabetes mellitus)    Dementia    Malignant melanoma of left lower limb, including hip    Mild CAD  carotids    Cancer of skin    No significant past surgical history    History of kidney surgery  remove cancer    H/O inguinal hernia repair    H/O shoulder surgery          Medication list         MEDICATIONS  (STANDING):  albuterol    0.083% 2.5 milliGRAM(s) Nebulizer every 12 hours  amLODIPine   Tablet 2.5 milliGRAM(s) Oral at bedtime  artificial tears (preservative free) Ophthalmic Solution 1 Drop(s) Both EYES four times a day  atorvastatin 80 milliGRAM(s) Oral at bedtime  benzonatate 100 milliGRAM(s) Oral three times a day  enoxaparin Injectable 40 milliGRAM(s) SubCutaneous every 24 hours  escitalopram 10 milliGRAM(s) Oral daily  guaiFENesin Oral Liquid (Sugar-Free) 100 milliGRAM(s) Oral every 6 hours  sodium chloride 0.9%. 1000 milliLiter(s) (40 mL/Hr) IV Continuous <Continuous>  sodium chloride 3%  Inhalation 4 milliLiter(s) Inhalation every 12 hours    MEDICATIONS  (PRN):  acetaminophen     Tablet .. 650 milliGRAM(s) Oral every 6 hours PRN Temp greater or equal to 38C (100.4F), Mild Pain (1 - 3)  aluminum hydroxide/magnesium hydroxide/simethicone Suspension 30 milliLiter(s) Oral every 4 hours PRN Dyspepsia  melatonin 3 milliGRAM(s) Oral at bedtime PRN Insomnia  ondansetron Injectable 4 milliGRAM(s) IV Push every 8 hours PRN Nausea and/or Vomiting         Vitals log        ICU Vital Signs Last 24 Hrs  T(C): 36.6 (04 Aug 2024 06:15), Max: 36.9 (03 Aug 2024 20:38)  T(F): 97.9 (04 Aug 2024 06:15), Max: 98.5 (03 Aug 2024 20:38)  HR: 87 (04 Aug 2024 06:15) (77 - 87)  BP: 139/71 (04 Aug 2024 06:15) (124/63 - 139/71)  BP(mean): --  ABP: --  ABP(mean): --  RR: 18 (04 Aug 2024 06:15) (18 - 18)  SpO2: 92% (04 Aug 2024 06:15) (92% - 93%)    O2 Parameters below as of 04 Aug 2024 06:15  Patient On (Oxygen Delivery Method): room air                 Input and Output:  I&O's Detail      Lab Data    08-03    x   |  x   |  x   ----------------------------<  x   x    |  x   |  0.53      TPro  5.5<L>  /  Alb  2.8<L>  /  TBili  0.6  /  DBili  0.2  /  AST  37  /  ALT  26  /  AlkPhos  100  08-03            Review of Systems	      Objective     Physical Examination    heart s1s2  lung dc BS  head nc  frail      Pertinent Lab findings & Imaging      Valery:  NO   Adequate UO     I&O's Detail           Discussed with:     Cultures:	        Radiology

## 2024-08-04 NOTE — PROGRESS NOTE ADULT - SUBJECTIVE AND OBJECTIVE BOX
[facility] DIVISION of INFECTIOUS DISEASE  Harrison Crespo MD PhD, Rizwana Patel MD, Rosa Maria Jones MD, Minerva Hall MD, Azael Theodore MD  and providing coverage with Madhu Clements MD  Providing Infectious Disease Consultations at [facility], [facility], [facility]'s    Office# [phone] to schedule follow up appointments  Answering Service for urgent calls or New Consults [phone]  Cell# to text for urgent issues Harrison Crespo [phone]     infectious diseases progress note:    DUSTIN JUNIOR is a 89y y. o. Male patient    Overnight and events of the last 24hrs reviewed    Allergies    amoxicillin (Unknown)  enalapril (Unknown)    Intolerances        ANTIBIOTICS/RELEVANT:  antimicrobials    immunologic:    OTHER:  acetaminophen     Tablet .. 650 milliGRAM(s) Oral every 6 hours PRN  albuterol    0.083% 2.5 milliGRAM(s) Nebulizer every 12 hours  aluminum hydroxide/magnesium hydroxide/simethicone Suspension 30 milliLiter(s) Oral every 4 hours PRN  amLODIPine   Tablet 2.5 milliGRAM(s) Oral at bedtime  artificial tears (preservative free) Ophthalmic Solution 1 Drop(s) Both EYES four times a day  atorvastatin 80 milliGRAM(s) Oral at bedtime  benzonatate 100 milliGRAM(s) Oral three times a day  enoxaparin Injectable 40 milliGRAM(s) SubCutaneous every 24 hours  escitalopram 10 milliGRAM(s) Oral daily  guaiFENesin Oral Liquid (Sugar-Free) 100 milliGRAM(s) Oral every 6 hours  melatonin 3 milliGRAM(s) Oral at bedtime PRN  ondansetron Injectable 4 milliGRAM(s) IV Push every 8 hours PRN  sodium chloride 3%  Inhalation 4 milliLiter(s) Inhalation every 12 hours      Objective:  Vital Signs Last 24 Hrs  T(C): 36.6 (04 Aug 2024 06:15), Max: 36.9 (03 Aug 2024 20:38)  T(F): 97.9 (04 Aug 2024 06:15), Max: 98.5 (03 Aug 2024 20:38)  HR: 78 (04 Aug 2024 08:13) (77 - 87)  BP: 139/71 (04 Aug 2024 06:15) (124/63 - 139/71)  BP(mean): --  RR: 18 (04 Aug 2024 06:15) (18 - 18)  SpO2: 93% (04 Aug 2024 08:13) (92% - 93%)    Parameters below as of 04 Aug 2024 08:13  Patient On (Oxygen Delivery Method): room air        T(C): 36.6 (08-04-24 @ 06:15), Max: 36.9 (08-03-24 @ 20:38)  T(C): 36.6 (08-04-24 @ 06:15), Max: 38.1 (08-01-24 @ 19:57)  T(C): 36.6 (08-04-24 @ 06:15), Max: 38.1 (08-01-24 @ 19:57)    PHYSICAL EXAM:  HEENT: NC atraumatic  Neck: supple  Respiratory: no accessory muscle use, breathing comfortably  Cardiovascular: distant  Gastrointestinal: normal appearing, nondistended  Extremities: no clubbing, no cyanosis,        LABS:                          11.3   15.98 )-----------( 477      ( 04 Aug 2024 06:22 )             32.1       WBC  15.98 08-04 @ 06:22  9.07 08-01 @ 08:08  8.05 07-31 @ 11:20      08-04    142  |  107  |  25<H>  ----------------------------<  202<H>  3.5   |  27  |  0.54    Ca    8.1<L>      04 Aug 2024 06:22  Mg     1.6     08-04    TPro  5.5<L>  /  Alb  2.8<L>  /  TBili  0.7  /  DBili  0.2  /  AST  31  /  ALT  27  /  AlkPhos  101  08-04      Creatinine: 0.54 mg/dL (08-04-24 @ 06:22)  Creatinine: 0.53 mg/dL (08-03-24 @ 08:50)  Creatinine: 0.47 mg/dL (08-02-24 @ 10:58)  Creatinine: 0.51 mg/dL (08-01-24 @ 08:08)  Creatinine: 0.57 mg/dL (07-31-24 @ 11:20)      PT/INR - ( 04 Aug 2024 06:22 )   PT: 17.6 sec;   INR: 1.52 ratio           Urinalysis Basic - ( 04 Aug 2024 06:22 )    Color: x / Appearance: x / SG: x / pH: x  Gluc: 202 mg/dL / Ketone: x  / Bili: x / Urobili: x   Blood: x / Protein: x / Nitrite: x   Leuk Esterase: x / RBC: x / WBC x   Sq Epi: x / Non Sq Epi: x / Bacteria: x            INFLAMMATORY MARKERS      MICROBIOLOGY:              RADIOLOGY & ADDITIONAL STUDIES:

## 2024-08-04 NOTE — PROGRESS NOTE ADULT - ASSESSMENT
89 y/o male with dementia, htn, DM-II and CAD presented with acute COVID of <1 week duration prior to admission    RECOMMENDATIONS  COVID-19  sp Remdesivir x 3 days 7/31-8/2  avoid steroids  no clear indication for abx so obs off abx  avoid steroids  -remains on room air and very somnolent    other issues will coordinate with medicine    Thank you for consulting us and involving us in the management of this most interesting and challenging case.  Please call us at [phone] or text me directly on my cell#[phone] with any concerns or further questions.    Thank you for consulting us and involving us in the management of this most interesting and challenging case.  We will follow along in the care of this patient. Please call us at [phone] or text me directly on my cell# at [phone] with any concerns.   89 y/o male with dementia, htn, DM-II and CAD presented with acute COVID of <1 week duration prior to admission    RECOMMENDATIONS  COVID-19  sp Remdesivir x 3 days 7/31-8/2  avoid steroids  no clear indication for abx so obs off abx  avoid steroids  -remains on room air and very somnolent    leukocytosis noted 8/4 will follow, noted CXR to be ordered    other issues will coordinate with medicine    Thank you for consulting us and involving us in the management of this most interesting and challenging case.  Please call us at [phone] or text me directly on my cell#[phone] with any concerns or further questions.    Thank you for consulting us and involving us in the management of this most interesting and challenging case.  We will follow along in the care of this patient. Please call us at [phone] or text me directly on my cell# at [phone] with any concerns.

## 2024-08-04 NOTE — CONSULT NOTE ADULT - NS ATTEND AMEND GEN_ALL_CORE FT
89 y/o male with a PMHx of dementia, HTN, HLD, T2DM  presented to the ED with decreased food and water intake found to be positive for COVID      no known structural heart disease  physically limited recently based on ortho issues and dementia  now adm with covid  exam does not suggest vol ol, but bnp is high  cxr limited but no clear hf  would not diurese  murmur on exam suggesting degen av disease but does not sound signif  for echo  will follow

## 2024-08-04 NOTE — CONSULT NOTE ADULT - ASSESSMENT
89 y/o male with dementia, htn, DM-II and CAD presented with acute COVID of <1 week duration prior to admission    RECOMMENDATIONS  COVID-19  Remdesivir x 3 days 7/31-8/2  avoid steroids  no clear indication for abx so obs off abx  avoid steroids    other issues will coordinate with medicine    Thank you for consulting us and involving us in the management of this most interesting and challenging case.  We will follow along in the care of this patient. Please call us at [phone] or text me directly on my cell# at [phone] with any concerns.  
89y old  Male who presents with a chief complaint of cough, not eating    weakness  frailty  anorexia  malaise  dementia  cad  covid    nebs  mucolytics  chest pt  suction PRN  ID eval noted  monitor VS and Sat  goal sat > 88 pct  assist with needs  oral hygiene  nutritional assessment  GOC - DNR DNI  prognosis guarded  COVID isol precs  cough rx regimen PRN  
89 y/o male with a PMHx of dementia, HTN, HLD, T2DM  presented to the ED with decreased food and water intake found to be positive for COVID      Elev BNP  - -> 1137, likely in the setting of acute infection, found to be COVID+  - supplemental medical hx was provided by wife at bedside, states that he never had any heart condition or issues in the past and patient does not see any cardiologist  - no significant sign of volume overload, appears dry on exam, no O2 requirement  - as per wife he had lost weight since admission and now weighs 109 lbs <- 120 lbs prior to admission, likely 2/2 to poor food /fluid intake     - + sys murmur during exam, per wife, unaware of it   - can obtain TTE to assess function and valves  - CXR noted lung bases obscured by pts arm and hand, otherwise no acute pathology  - Continue to monitor strict intake and output, appears to be good diuresis currently.    - BP, HR stable and controlled per flowsheet  - continue amlodipine  - continue to monitor routine hemodynamics  - Monitor and replete Lytes. Keep K > 4 and Mg > 2    - EKG SB with 1st deg AVB  - no acute ischemia noted  - no anginal complaints     - +COVID, s/p remdesivir, ID following  - iso and rest of care per primary    - Will continue to follow.    BREN Crowley  Nurse Practitioner - Cardiology   call TEAMS

## 2024-08-04 NOTE — PROGRESS NOTE ADULT - ASSESSMENT
89y old  Male who presents with a chief complaint of cough, not eating    weakness  frailty  anorexia  malaise  dementia  cad  covid    on IVF  on RA  VS noted  proBNP noted    nebs  mucolytics  chest pt  suction PRN  ID eval noted  monitor VS and Sat  goal sat > 88 pct  assist with needs  oral hygiene  nutritional assessment  GOC - DNR DNI  prognosis guarded  COVID isol precs  cough rx regimen PRN

## 2024-08-05 ENCOUNTER — RESULT REVIEW (OUTPATIENT)
Age: 89
End: 2024-08-05

## 2024-08-05 LAB
ANION GAP SERPL CALC-SCNC: 5 MMOL/L — SIGNIFICANT CHANGE UP (ref 5–17)
BUN SERPL-MCNC: 20 MG/DL — SIGNIFICANT CHANGE UP (ref 7–23)
CALCIUM SERPL-MCNC: 8.1 MG/DL — LOW (ref 8.5–10.1)
CHLORIDE SERPL-SCNC: 109 MMOL/L — HIGH (ref 96–108)
CO2 SERPL-SCNC: 30 MMOL/L — SIGNIFICANT CHANGE UP (ref 22–31)
CREAT SERPL-MCNC: 0.44 MG/DL — LOW (ref 0.5–1.3)
CULTURE RESULTS: SIGNIFICANT CHANGE UP
CULTURE RESULTS: SIGNIFICANT CHANGE UP
EGFR: 99 ML/MIN/1.73M2 — SIGNIFICANT CHANGE UP
GLUCOSE SERPL-MCNC: 179 MG/DL — HIGH (ref 70–99)
HCT VFR BLD CALC: 29.4 % — LOW (ref 39–50)
HGB BLD-MCNC: 10.2 G/DL — LOW (ref 13–17)
MAGNESIUM SERPL-MCNC: 1.7 MG/DL — SIGNIFICANT CHANGE UP (ref 1.6–2.6)
MCHC RBC-ENTMCNC: 34.7 GM/DL — SIGNIFICANT CHANGE UP (ref 32–36)
MCHC RBC-ENTMCNC: 36.3 PG — HIGH (ref 27–34)
MCV RBC AUTO: 104.6 FL — HIGH (ref 80–100)
NRBC # BLD: 0 /100 WBCS — SIGNIFICANT CHANGE UP (ref 0–0)
PLATELET # BLD AUTO: 433 K/UL — HIGH (ref 150–400)
POTASSIUM SERPL-MCNC: 3.1 MMOL/L — LOW (ref 3.5–5.3)
POTASSIUM SERPL-SCNC: 3.1 MMOL/L — LOW (ref 3.5–5.3)
RBC # BLD: 2.81 M/UL — LOW (ref 4.2–5.8)
RBC # FLD: 13 % — SIGNIFICANT CHANGE UP (ref 10.3–14.5)
SODIUM SERPL-SCNC: 144 MMOL/L — SIGNIFICANT CHANGE UP (ref 135–145)
SPECIMEN SOURCE: SIGNIFICANT CHANGE UP
SPECIMEN SOURCE: SIGNIFICANT CHANGE UP
WBC # BLD: 9.32 K/UL — SIGNIFICANT CHANGE UP (ref 3.8–10.5)
WBC # FLD AUTO: 9.32 K/UL — SIGNIFICANT CHANGE UP (ref 3.8–10.5)

## 2024-08-05 PROCEDURE — 99232 SBSQ HOSP IP/OBS MODERATE 35: CPT

## 2024-08-05 PROCEDURE — 93306 TTE W/DOPPLER COMPLETE: CPT | Mod: 26

## 2024-08-05 RX ORDER — POTASSIUM CHLORIDE 1500 MG/1
40 TABLET, EXTENDED RELEASE ORAL ONCE
Refills: 0 | Status: COMPLETED | OUTPATIENT
Start: 2024-08-05 | End: 2024-08-05

## 2024-08-05 RX ADMIN — BENZONATATE 100 MILLIGRAM(S): 100 CAPSULE, LIQUID FILLED ORAL at 06:36

## 2024-08-05 RX ADMIN — Medication 1 DROP(S): at 12:56

## 2024-08-05 RX ADMIN — Medication 1 DROP(S): at 06:36

## 2024-08-05 RX ADMIN — Medication 4 MILLILITER(S): at 07:50

## 2024-08-05 RX ADMIN — Medication 4 MILLILITER(S): at 19:27

## 2024-08-05 RX ADMIN — GUAIFENESIN 100 MILLIGRAM(S): 100 SOLUTION ORAL at 12:56

## 2024-08-05 RX ADMIN — Medication 10 MILLIGRAM(S): at 12:56

## 2024-08-05 RX ADMIN — Medication 1 DROP(S): at 18:39

## 2024-08-05 RX ADMIN — ENOXAPARIN SODIUM 40 MILLIGRAM(S): 120 INJECTION SUBCUTANEOUS at 18:39

## 2024-08-05 RX ADMIN — GUAIFENESIN 100 MILLIGRAM(S): 100 SOLUTION ORAL at 18:39

## 2024-08-05 RX ADMIN — AMLODIPINE BESYLATE 2.5 MILLIGRAM(S): 2.5 TABLET ORAL at 22:32

## 2024-08-05 RX ADMIN — ATORVASTATIN CALCIUM 80 MILLIGRAM(S): 40 TABLET, FILM COATED ORAL at 22:32

## 2024-08-05 RX ADMIN — Medication 2.5 MILLIGRAM(S): at 07:50

## 2024-08-05 RX ADMIN — Medication 2.5 MILLIGRAM(S): at 19:26

## 2024-08-05 RX ADMIN — POTASSIUM CHLORIDE 40 MILLIEQUIVALENT(S): 1500 TABLET, EXTENDED RELEASE ORAL at 20:15

## 2024-08-05 RX ADMIN — GUAIFENESIN 100 MILLIGRAM(S): 100 SOLUTION ORAL at 06:36

## 2024-08-05 RX ADMIN — BENZONATATE 100 MILLIGRAM(S): 100 CAPSULE, LIQUID FILLED ORAL at 22:32

## 2024-08-05 NOTE — PROGRESS NOTE ADULT - SUBJECTIVE AND OBJECTIVE BOX
Date of Service: 08-05-24 @ 09:38    Patient is a 89y old  Male who presents with a chief complaint of cough, not eating (05 Aug 2024 07:45)      INTERVAL HPI/OVERNIGHT EVENTS: Patient seen and examined. NAD.     Vital Signs Last 24 Hrs  T(C): 37.1 (05 Aug 2024 06:40), Max: 37.1 (04 Aug 2024 21:17)  T(F): 98.7 (05 Aug 2024 06:40), Max: 98.7 (04 Aug 2024 21:17)  HR: 72 (05 Aug 2024 08:10) (61 - 82)  BP: 134/61 (05 Aug 2024 06:40) (120/56 - 144/64)  BP(mean): --  RR: 17 (05 Aug 2024 06:40) (17 - 17)  SpO2: 93% (05 Aug 2024 06:40) (92% - 94%)    Parameters below as of 05 Aug 2024 06:40  Patient On (Oxygen Delivery Method): room air        08-05    144  |  109<H>  |  20  ----------------------------<  179<H>  3.1<L>   |  30  |  0.44<L>    Ca    8.1<L>      05 Aug 2024 07:42  Mg     1.7     08-05    TPro  5.5<L>  /  Alb  2.8<L>  /  TBili  0.7  /  DBili  0.2  /  AST  31  /  ALT  27  /  AlkPhos  101  08-04                          10.2   9.32  )-----------( 433      ( 05 Aug 2024 07:42 )             29.4     PT/INR - ( 04 Aug 2024 06:22 )   PT: 17.6 sec;   INR: 1.52 ratio           CAPILLARY BLOOD GLUCOSE        Urinalysis Basic - ( 05 Aug 2024 07:42 )    Color: x / Appearance: x / SG: x / pH: x  Gluc: 179 mg/dL / Ketone: x  / Bili: x / Urobili: x   Blood: x / Protein: x / Nitrite: x   Leuk Esterase: x / RBC: x / WBC x   Sq Epi: x / Non Sq Epi: x / Bacteria: x    < from: Xray Chest 1 View- PORTABLE-Urgent (Xray Chest 1 View- PORTABLE-Urgent .) (08.04.24 @ 09:52) >    ACC: [id number] EXAM:  XR CHEST PORTABLE URGENT 1V   ORDERED BY: MAGDA MOONEY     PROCEDURE DATE:  08/04/2024          INTERPRETATION:  Portable AP chest radiograph    COMPARISON: 7/31/2024 chest x-ray.    CLINICAL INFORMATION: Shortness of breath.    FINDINGS:  CATHETERS AND TUBES: None    PULMONARY:  No large airspace consolidation, pleural effusion and/or evidence of   pneumothorax... Patient's hand and arm obscures RIGHT and LEFT lower   zones.  No pleural effusion or pneumothorax.    HEART/VASCULAR: The heart size and mediastinum configuration are within   the limits of normal.    BONES: Chronic LEFT humeral neck fracture with medial displacement of   distal fracture segment.      IMPRESSION: Lung bases obscured by patient's arm and hand.  No large airspace consolidation. No pneumothorax.  Lateral radiograph recommended to evaluate posterior lung bases.    --- End of Report ---            NORMAN HARRY MD; Attending Radiologist  This document has been electronically signed. Aug  4 2024 10:21AM    < end of copied text >            acetaminophen     Tablet .. 650 milliGRAM(s) Oral every 6 hours PRN  albuterol    0.083% 2.5 milliGRAM(s) Nebulizer every 12 hours  aluminum hydroxide/magnesium hydroxide/simethicone Suspension 30 milliLiter(s) Oral every 4 hours PRN  amLODIPine   Tablet 2.5 milliGRAM(s) Oral at bedtime  artificial tears (preservative free) Ophthalmic Solution 1 Drop(s) Both EYES four times a day  atorvastatin 80 milliGRAM(s) Oral at bedtime  benzonatate 100 milliGRAM(s) Oral three times a day  enoxaparin Injectable 40 milliGRAM(s) SubCutaneous every 24 hours  escitalopram 10 milliGRAM(s) Oral daily  guaiFENesin Oral Liquid (Sugar-Free) 100 milliGRAM(s) Oral every 6 hours  melatonin 3 milliGRAM(s) Oral at bedtime PRN  ondansetron Injectable 4 milliGRAM(s) IV Push every 8 hours PRN  potassium chloride   Powder 40 milliEquivalent(s) Oral once  sodium chloride 3%  Inhalation 4 milliLiter(s) Inhalation every 12 hours              REVIEW OF SYSTEMS:  CONSTITUTIONAL: No fever, no weight loss, or no fatigue  NECK: No pain, no stiffness  RESPIRATORY: No cough, no wheezing, no chills, no hemoptysis, No shortness of breath  CARDIOVASCULAR: No chest pain, no palpitations, no dizziness, no leg swelling  GASTROINTESTINAL: No abdominal pain. No nausea, no vomiting, no hematemesis; No diarrhea, no constipation. No melena, no hematochezia.  GENITOURINARY: No dysuria, no frequency, no hematuria, no incontinence  NEUROLOGICAL: No headaches, no loss of strength, no numbness, no tremors  SKIN: No itching, no burning  MUSCULOSKELETAL: No joint pain, no swelling; No muscle, no back, no extremity pain  PSYCHIATRIC: No depression, no mood swings,   HEME/LYMPH: No easy bruising, no bleeding gums  ALLERY AND IMMUNOLOGIC: No hives       Consultant(s) Notes Reviewed:  [X] YES  [ ] NO    PHYSICAL EXAM:  GENERAL: NAD  HEAD:  Atraumatic, Normocephalic  EYES: EOMI, PERRLA, conjunctiva and sclera clear  ENMT: No tonsillar erythema, exudates, or enlargement; Moist mucous membranes  NECK: Supple, No JVD  NERVOUS SYSTEM:  Awake & alert  CHEST/LUNG: Clear to auscultation bilaterally; No rales, rhonchi, wheezing,  HEART: Regular rate and rhythm  ABDOMEN: Soft, Nontender, Nondistended; Bowel sounds present  EXTREMITIES:  No clubbing, cyanosis, or edema  LYMPH: No lymphadenopathy noted  SKIN: No rashes      Advanced care planning discussed with patient/family [X] YES   [ ] NO    Advanced care planning discussed with patient/family. Patient's health status was discussed. All appropriate changes have been made regarding patient's end-of-life care. Advanced care planning forms reviewed/discussed/completed.  20 minutes spent.

## 2024-08-05 NOTE — PROGRESS NOTE ADULT - SUBJECTIVE AND OBJECTIVE BOX
[facility] -- Ida Abarca,  Vaughn, Rafael Lindsey Savella, Goodger, Cohen  Office # [phone]    Follow Up:  elevated proBNP    Subjective/Observations: in progress    REVIEW OF SYSTEMS: All other review of systems is negative unless indicated above  PAST MEDICAL & SURGICAL HISTORY:  Hypertension      Hyperlipidemia      Anxiety      DM (diabetes mellitus)      Dementia      Malignant melanoma of left lower limb, including hip      Mild CAD  carotids      Cancer of skin      History of kidney surgery  remove cancer      H/O inguinal hernia repair      H/O shoulder surgery        MEDICATIONS  (STANDING):  albuterol    0.083% 2.5 milliGRAM(s) Nebulizer every 12 hours  amLODIPine   Tablet 2.5 milliGRAM(s) Oral at bedtime  artificial tears (preservative free) Ophthalmic Solution 1 Drop(s) Both EYES four times a day  atorvastatin 80 milliGRAM(s) Oral at bedtime  benzonatate 100 milliGRAM(s) Oral three times a day  enoxaparin Injectable 40 milliGRAM(s) SubCutaneous every 24 hours  escitalopram 10 milliGRAM(s) Oral daily  guaiFENesin Oral Liquid (Sugar-Free) 100 milliGRAM(s) Oral every 6 hours  sodium chloride 3%  Inhalation 4 milliLiter(s) Inhalation every 12 hours    MEDICATIONS  (PRN):  acetaminophen     Tablet .. 650 milliGRAM(s) Oral every 6 hours PRN Temp greater or equal to 38C (100.4F), Mild Pain (1 - 3)  aluminum hydroxide/magnesium hydroxide/simethicone Suspension 30 milliLiter(s) Oral every 4 hours PRN Dyspepsia  melatonin 3 milliGRAM(s) Oral at bedtime PRN Insomnia  ondansetron Injectable 4 milliGRAM(s) IV Push every 8 hours PRN Nausea and/or Vomiting    Allergies    amoxicillin (Unknown)  enalapril (Unknown)    Intolerances      Vital Signs Last 24 Hrs  T(C): 37.1 (05 Aug 2024 06:40), Max: 37.1 (04 Aug 2024 21:17)  T(F): 98.7 (05 Aug 2024 06:40), Max: 98.7 (04 Aug 2024 21:17)  HR: 70 (05 Aug 2024 06:40) (61 - 82)  BP: 134/61 (05 Aug 2024 06:40) (120/56 - 144/64)  BP(mean): --  RR: 17 (05 Aug 2024 06:40) (17 - 17)  SpO2: 93% (05 Aug 2024 06:40) (92% - 94%)    Parameters below as of 05 Aug 2024 06:40  Patient On (Oxygen Delivery Method): room air      I&O's Summary      TELE: Not on telemetry   Constitutional: NAD  HEENT: Moist Mucous Membranes, Anicteric, frail   Pulmonary: Non-labored, breath sounds are clear bilaterally, No wheezing, rales or rhonchi  Cardiovascular: Regular, S1 and S2, + murmurs, rubs, gallops or clicks  Gastrointestinal: Bowel Sounds present, soft, nontender.   Lymph: No peripheral edema. No lymphadenopathy.  Skin: No visible rashes or ulcers.  Psych:  Mood & affect appropriate for situation      LABS: All Labs Reviewed:                        11.3   15.98 )-----------( 477      ( 04 Aug 2024 06:22 )             32.1     04 Aug 2024 06:22    142    |  107    |  25     ----------------------------<  202    3.5     |  27     |  0.54   03 Aug 2024 08:50    x      |  x      |  x      ----------------------------<  x      x       |  x      |  0.53   02 Aug 2024 10:58    x      |  x      |  x      ----------------------------<  x      x       |  x      |  0.47     Ca    8.1        04 Aug 2024 06:22  Mg     1.6       04 Aug 2024 06:22    TPro  5.5    /  Alb  2.8    /  TBili  0.7    /  DBili  0.2    /  AST  31     /  ALT  27     /  AlkPhos  101    04 Aug 2024 06:22  TPro  5.5    /  Alb  2.8    /  TBili  0.6    /  DBili  0.2    /  AST  37     /  ALT  26     /  AlkPhos  100    03 Aug 2024 08:50  TPro  5.6    /  Alb  2.7    /  TBili  0.6    /  DBili  0.2    /  AST  40     /  ALT  25     /  AlkPhos  105    02 Aug 2024 10:58    PT/INR - ( 04 Aug 2024 06:22 )   PT: 17.6 sec;   INR: 1.52 ratio               12 Lead ECG:   Ventricular Rate 54 BPM    Atrial Rate 54 BPM    P-R Interval 218 ms    QRS Duration 90 ms    Q-T Interval 432 ms    QTC Calculation(Bazett) 409 ms    P Axis 98 degrees    R Axis 49 degrees    T Axis 82 degrees    Diagnosis Line Sinus bradycardia with 1st degree AV block  Low voltage QRS  Septal infarct , age undetermined  Confirmed by BETH LINDSEY ([id number]) on 7/31/2024 11:45:38 AM (07-31-24 @ 11:40)        [facility] -- Ida Abarca,  Vaughn, Rafael Lindsey Savella, Goodger, Cohen  Office # [phone]    Follow Up:  elevated proBNP    Subjective/Observations: No events overnight resting comfortably in bed.  No complaints of chest pain, dyspnea, or palpitations reported. No signs of orthopnea or PND. Toleraring RA.       REVIEW OF SYSTEMS: All other review of systems is negative unless indicated above  PAST MEDICAL & SURGICAL HISTORY:  Hypertension      Hyperlipidemia      Anxiety      DM (diabetes mellitus)      Dementia      Malignant melanoma of left lower limb, including hip      Mild CAD  carotids      Cancer of skin      History of kidney surgery  remove cancer      H/O inguinal hernia repair      H/O shoulder surgery        MEDICATIONS  (STANDING):  albuterol    0.083% 2.5 milliGRAM(s) Nebulizer every 12 hours  amLODIPine   Tablet 2.5 milliGRAM(s) Oral at bedtime  artificial tears (preservative free) Ophthalmic Solution 1 Drop(s) Both EYES four times a day  atorvastatin 80 milliGRAM(s) Oral at bedtime  benzonatate 100 milliGRAM(s) Oral three times a day  enoxaparin Injectable 40 milliGRAM(s) SubCutaneous every 24 hours  escitalopram 10 milliGRAM(s) Oral daily  guaiFENesin Oral Liquid (Sugar-Free) 100 milliGRAM(s) Oral every 6 hours  sodium chloride 3%  Inhalation 4 milliLiter(s) Inhalation every 12 hours    MEDICATIONS  (PRN):  acetaminophen     Tablet .. 650 milliGRAM(s) Oral every 6 hours PRN Temp greater or equal to 38C (100.4F), Mild Pain (1 - 3)  aluminum hydroxide/magnesium hydroxide/simethicone Suspension 30 milliLiter(s) Oral every 4 hours PRN Dyspepsia  melatonin 3 milliGRAM(s) Oral at bedtime PRN Insomnia  ondansetron Injectable 4 milliGRAM(s) IV Push every 8 hours PRN Nausea and/or Vomiting    Allergies    amoxicillin (Unknown)  enalapril (Unknown)    Intolerances      Vital Signs Last 24 Hrs  T(C): 37.1 (05 Aug 2024 06:40), Max: 37.1 (04 Aug 2024 21:17)  T(F): 98.7 (05 Aug 2024 06:40), Max: 98.7 (04 Aug 2024 21:17)  HR: 70 (05 Aug 2024 06:40) (61 - 82)  BP: 134/61 (05 Aug 2024 06:40) (120/56 - 144/64)  BP(mean): --  RR: 17 (05 Aug 2024 06:40) (17 - 17)  SpO2: 93% (05 Aug 2024 06:40) (92% - 94%)    Parameters below as of 05 Aug 2024 06:40  Patient On (Oxygen Delivery Method): room air      I&O's Summary      TELE: Not on telemetry   Constitutional: NAD  HEENT: Moist Mucous Membranes, Anicteric, frail   Pulmonary: Non-labored, breath sounds are clear bilaterally, No wheezing, rales or rhonchi  Cardiovascular: Regular, S1 and S2, + murmurs, rubs, gallops or clicks  Gastrointestinal: Bowel Sounds present, soft, nontender.   Lymph: No peripheral edema. No lymphadenopathy.  Skin: No visible rashes or ulcers.  Psych:  Mood & affect appropriate for situation      LABS: All Labs Reviewed:                        11.3   15.98 )-----------( 477      ( 04 Aug 2024 06:22 )             32.1     04 Aug 2024 06:22    142    |  107    |  25     ----------------------------<  202    3.5     |  27     |  0.54   03 Aug 2024 08:50    x      |  x      |  x      ----------------------------<  x      x       |  x      |  0.53   02 Aug 2024 10:58    x      |  x      |  x      ----------------------------<  x      x       |  x      |  0.47     Ca    8.1        04 Aug 2024 06:22  Mg     1.6       04 Aug 2024 06:22    TPro  5.5    /  Alb  2.8    /  TBili  0.7    /  DBili  0.2    /  AST  31     /  ALT  27     /  AlkPhos  101    04 Aug 2024 06:22  TPro  5.5    /  Alb  2.8    /  TBili  0.6    /  DBili  0.2    /  AST  37     /  ALT  26     /  AlkPhos  100    03 Aug 2024 08:50  TPro  5.6    /  Alb  2.7    /  TBili  0.6    /  DBili  0.2    /  AST  40     /  ALT  25     /  AlkPhos  105    02 Aug 2024 10:58    PT/INR - ( 04 Aug 2024 06:22 )   PT: 17.6 sec;   INR: 1.52 ratio               12 Lead ECG:   Ventricular Rate 54 BPM    Atrial Rate 54 BPM    P-R Interval 218 ms    QRS Duration 90 ms    Q-T Interval 432 ms    QTC Calculation(Bazett) 409 ms    P Axis 98 degrees    R Axis 49 degrees    T Axis 82 degrees    Diagnosis Line Sinus bradycardia with 1st degree AV block  Low voltage QRS  Septal infarct , age undetermined  Confirmed by BETH LINDSEY ([id number]) on 7/31/2024 11:45:38 AM (07-31-24 @ 11:40)

## 2024-08-05 NOTE — PROGRESS NOTE ADULT - SUBJECTIVE AND OBJECTIVE BOX
[facility] DIV[facility] of INFECTIOUS DISEASE  Harrison Crespo MD PhD, Rizwana Patel MD, Rosa Maria Jones MD, Minerva Hall MD, Azael Theodore MD  and providing coverage with Madhu Clements MD  Providing Infectious Disease Consultations at [facility], [facility], [facility]'s    Office# [phone] to schedule follow up appointments  Answering Service for urgent calls or New Consults [phone]  Cell# to text for urgent issues Harrison Crespo [phone]     infectious diseases progress note:    DUSTIN JUNIOR is a 89y y. o. Male patient    Overnight and events of the last 24hrs reviewed    Allergies    amoxicillin (Unknown)  enalapril (Unknown)    Intolerances        ANTIBIOTICS/RELEVANT:  antimicrobials    immunologic:    OTHER:  acetaminophen     Tablet .. 650 milliGRAM(s) Oral every 6 hours PRN  albuterol    0.083% 2.5 milliGRAM(s) Nebulizer every 12 hours  aluminum hydroxide/magnesium hydroxide/simethicone Suspension 30 milliLiter(s) Oral every 4 hours PRN  amLODIPine   Tablet 2.5 milliGRAM(s) Oral at bedtime  artificial tears (preservative free) Ophthalmic Solution 1 Drop(s) Both EYES four times a day  atorvastatin 80 milliGRAM(s) Oral at bedtime  benzonatate 100 milliGRAM(s) Oral three times a day  enoxaparin Injectable 40 milliGRAM(s) SubCutaneous every 24 hours  escitalopram 10 milliGRAM(s) Oral daily  guaiFENesin Oral Liquid (Sugar-Free) 100 milliGRAM(s) Oral every 6 hours  melatonin 3 milliGRAM(s) Oral at bedtime PRN  ondansetron Injectable 4 milliGRAM(s) IV Push every 8 hours PRN  potassium chloride   Powder 40 milliEquivalent(s) Oral once  sodium chloride 3%  Inhalation 4 milliLiter(s) Inhalation every 12 hours      Objective:  Vital Signs Last 24 Hrs  T(C): 37.1 (05 Aug 2024 06:40), Max: 37.1 (04 Aug 2024 21:17)  T(F): 98.7 (05 Aug 2024 06:40), Max: 98.7 (04 Aug 2024 21:17)  HR: 72 (05 Aug 2024 08:10) (61 - 82)  BP: 134/61 (05 Aug 2024 06:40) (120/56 - 144/64)  BP(mean): --  RR: 17 (05 Aug 2024 06:40) (17 - 17)  SpO2: 93% (05 Aug 2024 06:40) (92% - 94%)    Parameters below as of 05 Aug 2024 06:40  Patient On (Oxygen Delivery Method): room air        T(C): 37.1 (08-05-24 @ 06:40), Max: 37.1 (08-04-24 @ 21:17)  T(C): 37.1 (08-05-24 @ 06:40), Max: 37.1 (08-04-24 @ 21:17)  T(C): 37.1 (08-05-24 @ 06:40), Max: 38.1 (08-01-24 @ 19:57)    PHYSICAL EXAM:  HEENT: NC atraumatic  Neck: supple  Respiratory: no accessory muscle use, breathing comfortably  Cardiovascular: distant  Gastrointestinal: normal appearing, nondistended  Extremities: no clubbing, no cyanosis,        LABS:                          10.2   9.32  )-----------( 433      ( 05 Aug 2024 07:42 )             29.4       WBC  9.32 08-05 @ 07:42  15.98 08-04 @ 06:22  9.07 08-01 @ 08:08  8.05 07-31 @ 11:20      08-05    144  |  109<H>  |  20  ----------------------------<  179<H>  3.1<L>   |  30  |  0.44<L>    Ca    8.1<L>      05 Aug 2024 07:42  Mg     1.7     08-05    TPro  5.5<L>  /  Alb  2.8<L>  /  TBili  0.7  /  DBili  0.2  /  AST  31  /  ALT  27  /  AlkPhos  101  08-04      Creatinine: 0.44 mg/dL (08-05-24 @ 07:42)  Creatinine: 0.54 mg/dL (08-04-24 @ 06:22)  Creatinine: 0.53 mg/dL (08-03-24 @ 08:50)  Creatinine: 0.47 mg/dL (08-02-24 @ 10:58)  Creatinine: 0.51 mg/dL (08-01-24 @ 08:08)  Creatinine: 0.57 mg/dL (07-31-24 @ 11:20)      PT/INR - ( 04 Aug 2024 06:22 )   PT: 17.6 sec;   INR: 1.52 ratio           Urinalysis Basic - ( 05 Aug 2024 07:42 )    Color: x / Appearance: x / SG: x / pH: x  Gluc: 179 mg/dL / Ketone: x  / Bili: x / Urobili: x   Blood: x / Protein: x / Nitrite: x   Leuk Esterase: x / RBC: x / WBC x   Sq Epi: x / Non Sq Epi: x / Bacteria: x            INFLAMMATORY MARKERS      MICROBIOLOGY:        RADIOLOGY & ADDITIONAL STUDIES:  < from: Xray Chest 1 View- PORTABLE-Urgent (Xray Chest 1 View- PORTABLE-Urgent .) (08.04.24 @ 09:52) >    ACC: [id number] EXAM:  XR CHEST PORTABLE URGENT 1V   ORDERED BY: MAGDA MOONEY     PROCEDURE DATE:  08/04/2024          INTERPRETATION:  Portable AP chest radiograph    COMPARISON: 7/31/2024 chest x-ray.    CLINICAL INFORMATION: Shortness of breath.    FINDINGS:  CATHETERS AND TUBES: None    PULMONARY:  No large airspace consolidation, pleural effusion and/or evidence of   pneumothorax... Patient's hand and arm obscures RIGHT and LEFT lower   zones.  No pleural effusion or pneumothorax.    HEART/VASCULAR: The heart size and mediastinum configuration are within   the limits of normal.    BONES: Chronic LEFT humeral neck fracture with medial displacement of   distal fracture segment.      IMPRESSION: Lung bases obscured by patient's arm and hand.  No large airspace consolidation. No pneumothorax.  Lateral radiograph recommended to evaluate posterior lung bases.    < end of copied text >

## 2024-08-05 NOTE — PROGRESS NOTE ADULT - SUBJECTIVE AND OBJECTIVE BOX
Date/Time Patient Seen:  		  Referring MD:   Data Reviewed	       Patient is a 89y old  Male who presents with a chief complaint of cough, not eating (04 Aug 2024 12:07)      Subjective/HPI     PAST MEDICAL & SURGICAL HISTORY:  Hypertension    Hyperlipidemia    Anxiety    DM (diabetes mellitus)    Dementia    Malignant melanoma of left lower limb, including hip    Mild CAD  carotids    Cancer of skin    No significant past surgical history    History of kidney surgery  remove cancer    H/O inguinal hernia repair    H/O shoulder surgery          Medication list         MEDICATIONS  (STANDING):  albuterol    0.083% 2.5 milliGRAM(s) Nebulizer every 12 hours  amLODIPine   Tablet 2.5 milliGRAM(s) Oral at bedtime  artificial tears (preservative free) Ophthalmic Solution 1 Drop(s) Both EYES four times a day  atorvastatin 80 milliGRAM(s) Oral at bedtime  benzonatate 100 milliGRAM(s) Oral three times a day  enoxaparin Injectable 40 milliGRAM(s) SubCutaneous every 24 hours  escitalopram 10 milliGRAM(s) Oral daily  guaiFENesin Oral Liquid (Sugar-Free) 100 milliGRAM(s) Oral every 6 hours  sodium chloride 3%  Inhalation 4 milliLiter(s) Inhalation every 12 hours    MEDICATIONS  (PRN):  acetaminophen     Tablet .. 650 milliGRAM(s) Oral every 6 hours PRN Temp greater or equal to 38C (100.4F), Mild Pain (1 - 3)  aluminum hydroxide/magnesium hydroxide/simethicone Suspension 30 milliLiter(s) Oral every 4 hours PRN Dyspepsia  melatonin 3 milliGRAM(s) Oral at bedtime PRN Insomnia  ondansetron Injectable 4 milliGRAM(s) IV Push every 8 hours PRN Nausea and/or Vomiting         Vitals log        ICU Vital Signs Last 24 Hrs  T(C): 37.1 (05 Aug 2024 06:40), Max: 37.1 (04 Aug 2024 21:17)  T(F): 98.7 (05 Aug 2024 06:40), Max: 98.7 (04 Aug 2024 21:17)  HR: 70 (05 Aug 2024 06:40) (61 - 82)  BP: 134/61 (05 Aug 2024 06:40) (120/56 - 144/64)  BP(mean): --  ABP: --  ABP(mean): --  RR: 17 (05 Aug 2024 06:40) (17 - 17)  SpO2: 93% (05 Aug 2024 06:40) (92% - 94%)    O2 Parameters below as of 05 Aug 2024 06:40  Patient On (Oxygen Delivery Method): room air                 Input and Output:  I&O's Detail      Lab Data                        11.3   15.98 )-----------( 477      ( 04 Aug 2024 06:22 )             32.1     08-04    142  |  107  |  25<H>  ----------------------------<  202<H>  3.5   |  27  |  0.54    Ca    8.1<L>      04 Aug 2024 06:22  Mg     1.6     08-04    TPro  5.5<L>  /  Alb  2.8<L>  /  TBili  0.7  /  DBili  0.2  /  AST  31  /  ALT  27  /  AlkPhos  101  08-04            Review of Systems	      Objective     Physical Examination    heart s1s2  lung dc BS  head nc      Pertinent Lab findings & Imaging      Valery:  NO   Adequate UO     I&O's Detail           Discussed with:     Cultures:	        Radiology

## 2024-08-05 NOTE — PROGRESS NOTE ADULT - ASSESSMENT
89y old  Male who presents with a chief complaint of cough, not eating    weakness  frailty  anorexia  malaise  dementia  cad  covid    cxr noted from 8 4 24 - [facility]  Cardio eval noted  vs noted    nebs  mucolytics  chest pt  suction PRN  ID eval noted  monitor VS and Sat  goal sat > 88 pct  assist with needs  oral hygiene  nutritional assessment  GOC - DNR DNI  prognosis guarded  COVID isol precs  cough rx regimen PRN

## 2024-08-05 NOTE — CASE MANAGEMENT PROGRESS NOTE - NSCMPROGRESSNOTE_GEN_ALL_CORE
24 hr notice of discharge given to patient's spouse. Explained the appeal process to spouse. Spouse verbalized understanding. Will remain available to patient and spouse throughout hospital stay  Anticipated discharge on 8/6/24 with Home care services of [facility] PT services through [facility] arranged for 8/7/24. 24 hr notice of discharge given to patient's spouse. Explained the appeal process to spouse. Spouse verbalized understanding. Will remain available to patient and spouse throughout hospital stay.

## 2024-08-05 NOTE — PROGRESS NOTE ADULT - ASSESSMENT
89 y/o male with a PMHx of dementia, HTN, HLD, T2DM  presented to the ED with decreased food and water intake found to be positive for COVID      Elev BNP    in progress 89 y/o male with a PMHx of dementia, HTN, HLD, T2DM  presented to the ED with decreased food and water intake found to be positive for COVID      Elev BNP  89 y/o male with a PMHx of dementia, HTN, HLD, T2DM  presented to the ED with decreased food and water intake found to be positive for COVID      Elev BNP  - -> 1137, likely in the setting of acute infection, found to be COVID+  - supplemental medical hx was provided by wife at bedside, states that he never had any heart condition or issues in the past and patient does not see any cardiologist  - no significant sign of volume overload, appears dry on exam, no O2 requirement  - as per wife he had lost weight since admission and now weighs 109 lbs <- 120 lbs prior to admission, likely 2/2 to poor food /fluid intake     - + sys murmur during exam, per wife, unaware of it   - can obtain TTE to assess function and valves  - CXR noted lung bases obscured by pts arm and hand, otherwise no acute pathology  - Continue to monitor strict intake and output, appears to be good diuresis currently.    - BP, HR stable and controlled per flowsheet  - continue amlodipine    - EKG SB with 1st deg AVB  - no acute ischemia noted  - no anginal complaints     - +COVID, s/p remdesivir, ID following  - iso and rest of care per primary    - dc planning per primary team  - Monitor and replete lytes, keep K>4, Mg>2.  - Will continue to follow.    Marion Lora NP  Nurse Practitioner- Cardiology   Call TEAMS

## 2024-08-05 NOTE — PROGRESS NOTE ADULT - ASSESSMENT
89 y/o male with dementia, htn, DM-II and CAD presented with acute COVID of <1 week duration prior to admission    RECOMMENDATIONS  COVID-19  sp Remdesivir x 3 days 7/31-8/2  avoid steroids  no clear indication for abx so obs off abx  avoid steroids  -remains on room air and very somnolent    leukocytosis noted 8/4 but now down- will follow, noted CXR -Lung bases obscured by patient's arm and hand. No large airspace consolidation. No pneumothorax.    other issues will coordinate with medicine    Thank you for consulting us and involving us in the management of this most interesting and challenging case.  Please call us at [phone] or text me directly on my cell#[phone] with any concerns or further questions.    Thank you for consulting us and involving us in the management of this most interesting and challenging case.  We will follow along in the care of this patient. Please call us at [phone] or text me directly on my cell# at [phone] with any concerns.

## 2024-08-06 VITALS
TEMPERATURE: 99 F | SYSTOLIC BLOOD PRESSURE: 129 MMHG | RESPIRATION RATE: 17 BRPM | OXYGEN SATURATION: 93 % | DIASTOLIC BLOOD PRESSURE: 63 MMHG | HEART RATE: 78 BPM

## 2024-08-06 LAB
ALBUMIN SERPL ELPH-MCNC: 2.4 G/DL — LOW (ref 3.3–5)
ALP SERPL-CCNC: 86 U/L — SIGNIFICANT CHANGE UP (ref 40–120)
ALT FLD-CCNC: 22 U/L — SIGNIFICANT CHANGE UP (ref 12–78)
AST SERPL-CCNC: 22 U/L — SIGNIFICANT CHANGE UP (ref 15–37)
BILIRUB DIRECT SERPL-MCNC: 0.3 MG/DL — SIGNIFICANT CHANGE UP (ref 0–0.3)
BILIRUB INDIRECT FLD-MCNC: 0.7 MG/DL — SIGNIFICANT CHANGE UP (ref 0.2–1)
BILIRUB SERPL-MCNC: 1 MG/DL — SIGNIFICANT CHANGE UP (ref 0.2–1.2)
PROT SERPL-MCNC: 5.3 G/DL — LOW (ref 6–8.3)

## 2024-08-06 PROCEDURE — 94760 N-INVAS EAR/PLS OXIMETRY 1: CPT

## 2024-08-06 PROCEDURE — 85025 COMPLETE CBC W/AUTO DIFF WBC: CPT

## 2024-08-06 PROCEDURE — 94640 AIRWAY INHALATION TREATMENT: CPT

## 2024-08-06 PROCEDURE — 36415 COLL VENOUS BLD VENIPUNCTURE: CPT

## 2024-08-06 PROCEDURE — 83735 ASSAY OF MAGNESIUM: CPT

## 2024-08-06 PROCEDURE — 93005 ELECTROCARDIOGRAM TRACING: CPT

## 2024-08-06 PROCEDURE — 82728 ASSAY OF FERRITIN: CPT

## 2024-08-06 PROCEDURE — 83036 HEMOGLOBIN GLYCOSYLATED A1C: CPT

## 2024-08-06 PROCEDURE — 84145 PROCALCITONIN (PCT): CPT

## 2024-08-06 PROCEDURE — 96365 THER/PROPH/DIAG IV INF INIT: CPT

## 2024-08-06 PROCEDURE — 99285 EMERGENCY DEPT VISIT HI MDM: CPT | Mod: 25

## 2024-08-06 PROCEDURE — 85730 THROMBOPLASTIN TIME PARTIAL: CPT

## 2024-08-06 PROCEDURE — 99232 SBSQ HOSP IP/OBS MODERATE 35: CPT

## 2024-08-06 PROCEDURE — 83880 ASSAY OF NATRIURETIC PEPTIDE: CPT

## 2024-08-06 PROCEDURE — 85027 COMPLETE CBC AUTOMATED: CPT

## 2024-08-06 PROCEDURE — 96375 TX/PRO/DX INJ NEW DRUG ADDON: CPT

## 2024-08-06 PROCEDURE — 82565 ASSAY OF CREATININE: CPT

## 2024-08-06 PROCEDURE — 87040 BLOOD CULTURE FOR BACTERIA: CPT

## 2024-08-06 PROCEDURE — 97161 PT EVAL LOW COMPLEX 20 MIN: CPT

## 2024-08-06 PROCEDURE — 80076 HEPATIC FUNCTION PANEL: CPT

## 2024-08-06 PROCEDURE — 92610 EVALUATE SWALLOWING FUNCTION: CPT

## 2024-08-06 PROCEDURE — 93306 TTE W/DOPPLER COMPLETE: CPT

## 2024-08-06 PROCEDURE — 80048 BASIC METABOLIC PNL TOTAL CA: CPT

## 2024-08-06 PROCEDURE — 85610 PROTHROMBIN TIME: CPT

## 2024-08-06 PROCEDURE — 83605 ASSAY OF LACTIC ACID: CPT

## 2024-08-06 PROCEDURE — 82962 GLUCOSE BLOOD TEST: CPT

## 2024-08-06 PROCEDURE — 80053 COMPREHEN METABOLIC PANEL: CPT

## 2024-08-06 PROCEDURE — 0225U NFCT DS DNA&RNA 21 SARSCOV2: CPT

## 2024-08-06 PROCEDURE — 71045 X-RAY EXAM CHEST 1 VIEW: CPT

## 2024-08-06 RX ORDER — GUAIFENESIN 100 MG/5ML
5 SOLUTION ORAL
Qty: 0 | Refills: 0 | DISCHARGE
Start: 2024-08-06

## 2024-08-06 RX ADMIN — BENZONATATE 100 MILLIGRAM(S): 100 CAPSULE, LIQUID FILLED ORAL at 06:18

## 2024-08-06 RX ADMIN — GUAIFENESIN 100 MILLIGRAM(S): 100 SOLUTION ORAL at 06:18

## 2024-08-06 RX ADMIN — Medication 2.5 MILLIGRAM(S): at 07:37

## 2024-08-06 RX ADMIN — Medication 4 MILLILITER(S): at 07:37

## 2024-08-06 RX ADMIN — Medication 1 DROP(S): at 06:18

## 2024-08-06 RX ADMIN — GUAIFENESIN 100 MILLIGRAM(S): 100 SOLUTION ORAL at 00:22

## 2024-08-06 NOTE — CASE MANAGEMENT PROGRESS NOTE - NSCMPROGRESSNOTE_GEN_ALL_CORE
Per MD, patient is medically cleared for discharge home today.  CM met with patient to discussed discharge disposition is home with [facility]. Discharge notice signed and copy given to patient.  Ambulance to transport patient home. Patient verbalized understanding of the transition plan and is in agreement. CM answered all questions to the best of my abilities. CM remains available throughout hospital stay.

## 2024-08-06 NOTE — SWALLOW BEDSIDE ASSESSMENT ADULT - ASR SWALLOW RECOMMEND DIAG
Objective testing not warranted at this time given no observed s/sx of penetration/aspiration, and chest imaging not concerning for aspiration pneumonia

## 2024-08-06 NOTE — PROGRESS NOTE ADULT - ASSESSMENT
89y old  Male who presents with a chief complaint of cough, not eating    weakness  frailty  anorexia  malaise  dementia  cad  covid    cxr noted from 8 4 24 - [facility]  cm f/u  dc planning  vs noted  on RA    nebs  mucolytics  chest pt  suction PRN  ID eval noted  monitor VS and Sat  goal sat > 88 pct  assist with needs  oral hygiene  nutritional assessment  GOC - DNR DNI  prognosis guarded  COVID isol precs  cough rx regimen PRN

## 2024-08-06 NOTE — PROGRESS NOTE ADULT - SUBJECTIVE AND OBJECTIVE BOX
Date of Service: 08-06-24 @ 09:36    Patient is a 89y old  Male who presents with a chief complaint of cough, not eating (06 Aug 2024 07:42)      INTERVAL HPI/OVERNIGHT EVENTS: Patient seen and examined. NAD. No complaints.    Vital Signs Last 24 Hrs  T(C): 37.1 (06 Aug 2024 06:20), Max: 37.1 (06 Aug 2024 06:20)  T(F): 98.8 (06 Aug 2024 06:20), Max: 98.8 (06 Aug 2024 06:20)  HR: 75 (06 Aug 2024 08:15) (70 - 88)  BP: 113/59 (06 Aug 2024 06:20) (112/73 - 133/73)  BP(mean): --  RR: 18 (06 Aug 2024 06:20) (17 - 18)  SpO2: 95% (06 Aug 2024 08:15) (94% - 96%)    Parameters below as of 06 Aug 2024 08:15  Patient On (Oxygen Delivery Method): room air        08-05    144  |  109<H>  |  20  ----------------------------<  179<H>  3.1<L>   |  30  |  0.44<L>    Ca    8.1<L>      05 Aug 2024 07:42  Mg     1.7     08-05    TPro  5.3<L>  /  Alb  2.4<L>  /  TBili  1.0  /  DBili  0.3  /  AST  22  /  ALT  22  /  AlkPhos  86  08-06                          10.2   9.32  )-----------( 433      ( 05 Aug 2024 07:42 )             29.4       CAPILLARY BLOOD GLUCOSE        Urinalysis Basic - ( 05 Aug 2024 07:42 )    Color: x / Appearance: x / SG: x / pH: x  Gluc: 179 mg/dL / Ketone: x  / Bili: x / Urobili: x   Blood: x / Protein: x / Nitrite: x   Leuk Esterase: x / RBC: x / WBC x   Sq Epi: x / Non Sq Epi: x / Bacteria: x              acetaminophen     Tablet .. 650 milliGRAM(s) Oral every 6 hours PRN  albuterol    0.083% 2.5 milliGRAM(s) Nebulizer every 12 hours  aluminum hydroxide/magnesium hydroxide/simethicone Suspension 30 milliLiter(s) Oral every 4 hours PRN  amLODIPine   Tablet 2.5 milliGRAM(s) Oral at bedtime  artificial tears (preservative free) Ophthalmic Solution 1 Drop(s) Both EYES four times a day  atorvastatin 80 milliGRAM(s) Oral at bedtime  benzonatate 100 milliGRAM(s) Oral three times a day  enoxaparin Injectable 40 milliGRAM(s) SubCutaneous every 24 hours  escitalopram 10 milliGRAM(s) Oral daily  guaiFENesin Oral Liquid (Sugar-Free) 100 milliGRAM(s) Oral every 6 hours  melatonin 3 milliGRAM(s) Oral at bedtime PRN  ondansetron Injectable 4 milliGRAM(s) IV Push every 8 hours PRN  sodium chloride 3%  Inhalation 4 milliLiter(s) Inhalation every 12 hours              REVIEW OF SYSTEMS:  CONSTITUTIONAL: No fever, no weight loss, or no fatigue  NECK: No pain, no stiffness  RESPIRATORY: No cough, no wheezing, no chills, no hemoptysis, No shortness of breath  CARDIOVASCULAR: No chest pain, no palpitations, no dizziness, no leg swelling  GASTROINTESTINAL: No abdominal pain. No nausea, no vomiting, no hematemesis; No diarrhea, no constipation. No melena, no hematochezia.  GENITOURINARY: No dysuria, no frequency, no hematuria, no incontinence  NEUROLOGICAL: No headaches, no loss of strength, no numbness, no tremors  SKIN: No itching, no burning  MUSCULOSKELETAL: No joint pain, no swelling; No muscle, no back, no extremity pain  PSYCHIATRIC: No depression, no mood swings,   HEME/LYMPH: No easy bruising, no bleeding gums  ALLERY AND IMMUNOLOGIC: No hives       Consultant(s) Notes Reviewed:  [X] YES  [ ] NO    PHYSICAL EXAM:  GENERAL: NAD  HEAD:  Atraumatic, Normocephalic  EYES: EOMI, PERRLA, conjunctiva and sclera clear  ENMT: No tonsillar erythema, exudates, or enlargement; Moist mucous membranes  NECK: Supple, No JVD  NERVOUS SYSTEM:  Awake & alert  CHEST/LUNG: Clear to auscultation bilaterally; No rales, rhonchi, wheezing,  HEART: Regular rate and rhythm  ABDOMEN: Soft, Nontender, Nondistended; Bowel sounds present  EXTREMITIES:  No clubbing, cyanosis, or edema  LYMPH: No lymphadenopathy noted  SKIN: No rashes      Advanced care planning discussed with patient/family [X] YES   [ ] NO    Advanced care planning discussed with patient/family. Patient's health status was discussed. All appropriate changes have been made regarding patient's end-of-life care. Advanced care planning forms reviewed/discussed/completed.  20 minutes spent.

## 2024-08-06 NOTE — PROGRESS NOTE ADULT - REASON FOR ADMISSION
cough, not eating

## 2024-08-06 NOTE — SWALLOW BEDSIDE ASSESSMENT ADULT - ASR SWALLOW ASPIRATION MONITOR
change of breathing pattern/position upright (89Y)/cough/gurgly voice/throat clearing/upper respiratory infection

## 2024-08-06 NOTE — PROGRESS NOTE ADULT - SUBJECTIVE AND OBJECTIVE BOX
[facility] DIVISION of INFECTIOUS DISEASE  Harrison Crespo MD PhD, Rizwana Patel MD, Rosa Maria Jones MD, Minerva Hall MD, Azael Theodore MD  and providing coverage with Madhu Clements MD  Providing Infectious Disease Consultations at [facility], [facility], [facility]'s    Office# [phone] to schedule follow up appointments  Answering Service for urgent calls or New Consults [phone]  Cell# to text for urgent issues Harrison Crespo [phone]     infectious diseases progress note:    DUSTIN JUNIOR is a 89y y. o. Male patient    Overnight and events of the last 24hrs reviewed    Allergies    amoxicillin (Unknown)  enalapril (Unknown)    Intolerances        ANTIBIOTICS/RELEVANT:  antimicrobials    immunologic:    OTHER:  acetaminophen     Tablet .. 650 milliGRAM(s) Oral every 6 hours PRN  albuterol    0.083% 2.5 milliGRAM(s) Nebulizer every 12 hours  aluminum hydroxide/magnesium hydroxide/simethicone Suspension 30 milliLiter(s) Oral every 4 hours PRN  amLODIPine   Tablet 2.5 milliGRAM(s) Oral at bedtime  artificial tears (preservative free) Ophthalmic Solution 1 Drop(s) Both EYES four times a day  atorvastatin 80 milliGRAM(s) Oral at bedtime  benzonatate 100 milliGRAM(s) Oral three times a day  enoxaparin Injectable 40 milliGRAM(s) SubCutaneous every 24 hours  escitalopram 10 milliGRAM(s) Oral daily  guaiFENesin Oral Liquid (Sugar-Free) 100 milliGRAM(s) Oral every 6 hours  melatonin 3 milliGRAM(s) Oral at bedtime PRN  ondansetron Injectable 4 milliGRAM(s) IV Push every 8 hours PRN  sodium chloride 3%  Inhalation 4 milliLiter(s) Inhalation every 12 hours      Objective:  Vital Signs Last 24 Hrs  T(C): 37.1 (06 Aug 2024 06:20), Max: 37.1 (06 Aug 2024 06:20)  T(F): 98.8 (06 Aug 2024 06:20), Max: 98.8 (06 Aug 2024 06:20)  HR: 75 (06 Aug 2024 08:15) (70 - 88)  BP: 113/59 (06 Aug 2024 06:20) (112/73 - 133/73)  BP(mean): --  RR: 18 (06 Aug 2024 06:20) (17 - 18)  SpO2: 95% (06 Aug 2024 08:15) (94% - 96%)    Parameters below as of 06 Aug 2024 08:15  Patient On (Oxygen Delivery Method): room air        T(C): 37.1 (08-06-24 @ 06:20), Max: 37.1 (08-04-24 @ 21:17)  T(C): 37.1 (08-06-24 @ 06:20), Max: 37.1 (08-04-24 @ 21:17)  T(C): 37.1 (08-06-24 @ 06:20), Max: 37.1 (08-04-24 @ 21:17)    PHYSICAL EXAM:  HEENT: NC atraumatic  Neck: supple  Respiratory: no accessory muscle use, breathing comfortably  Cardiovascular: distant  Gastrointestinal: normal appearing, nondistended  Extremities: no clubbing, no cyanosis,        LABS:                          10.2   9.32  )-----------( 433      ( 05 Aug 2024 07:42 )             29.4       WBC  9.32 08-05 @ 07:42  15.98 08-04 @ 06:22  9.07 08-01 @ 08:08  8.05 07-31 @ 11:20      08-05    144  |  109<H>  |  20  ----------------------------<  179<H>  3.1<L>   |  30  |  0.44<L>    Ca    8.1<L>      05 Aug 2024 07:42  Mg     1.7     08-05    TPro  5.3<L>  /  Alb  2.4<L>  /  TBili  1.0  /  DBili  0.3  /  AST  22  /  ALT  22  /  AlkPhos  86  08-06      Creatinine: 0.44 mg/dL (08-05-24 @ 07:42)  Creatinine: 0.54 mg/dL (08-04-24 @ 06:22)  Creatinine: 0.53 mg/dL (08-03-24 @ 08:50)  Creatinine: 0.47 mg/dL (08-02-24 @ 10:58)  Creatinine: 0.51 mg/dL (08-01-24 @ 08:08)  Creatinine: 0.57 mg/dL (07-31-24 @ 11:20)        Urinalysis Basic - ( 05 Aug 2024 07:42 )    Color: x / Appearance: x / SG: x / pH: x  Gluc: 179 mg/dL / Ketone: x  / Bili: x / Urobili: x   Blood: x / Protein: x / Nitrite: x   Leuk Esterase: x / RBC: x / WBC x   Sq Epi: x / Non Sq Epi: x / Bacteria: x            INFLAMMATORY MARKERS      MICROBIOLOGY:              RADIOLOGY & ADDITIONAL STUDIES:

## 2024-08-06 NOTE — PROGRESS NOTE ADULT - NS ATTEND AMEND GEN_ALL_CORE FT
89 y/o male with a PMHx of dementia, HTN, HLD, T2DM  presented to the ED with decreased food and water intake found to be positive for COVID      no known structural heart disease  physically limited recently based on ortho issues and dementia  now adm with covid  exam does not suggest vol ol, but bnp is high  cxr limited but no clear hf  would not diurese  TTE LVEF 70-75%, LV DD grade I, mild AR, mod MR.   DC planning
89 y/o male with a PMHx of dementia, HTN, HLD, T2DM  presented to the ED with decreased food and water intake found to be positive for COVID      no known structural heart disease  physically limited recently based on ortho issues and dementia  now adm with covid  exam does not suggest vol ol, but bnp is high  cxr limited but no clear hf  would not diurese  murmur on exam suggesting degen av disease but does not sound signif  for echo  will follow.

## 2024-08-06 NOTE — PROGRESS NOTE ADULT - NUTRITIONAL ASSESSMENT
This patient has been assessed with a concern for Malnutrition and has been determined to have a diagnosis/diagnoses of Underweight (BMI < 19).    This patient is being managed with:   Diet DASH/TLC-  Sodium & Cholesterol Restricted  Minced and Moist (MINCEDMOIST)  Supplement Feeding Modality:  Oral  Ensure Plus High Protein Cans or Servings Per Day:  1       Frequency:  Daily  Entered: Aug  5 2024  1:28PM  
This patient has been assessed with a concern for Malnutrition and has been determined to have a diagnosis/diagnoses of Underweight (BMI < 19).    This patient is being managed with:   Diet DASH/TLC-  Sodium & Cholesterol Restricted  Minced and Moist (JOSEMOIST)  Entered: Aug  1 2024 11:22AM  
This patient has been assessed with a concern for Malnutrition and has been determined to have a diagnosis/diagnoses of Underweight (BMI < 19).    This patient is being managed with:   Diet DASH/TLC-  Sodium & Cholesterol Restricted  Minced and Moist (JOSEMOIST)  Entered: Aug  1 2024 11:22AM

## 2024-08-06 NOTE — SWALLOW BEDSIDE ASSESSMENT ADULT - ADDITIONAL RECOMMENDATIONS
Please reconsult this service as needed. This service to follow up as schedule permits for diet tolerance and need for objective testing.

## 2024-08-06 NOTE — SWALLOW BEDSIDE ASSESSMENT ADULT - SWALLOW EVAL: DIAGNOSIS
1) Mild oral dysphagia for minced and moist solids, puree, moderately thick, mildly thick, and thin liquids marked by reduced utensil stripping, prolonged manipulation, and reduced coordination resulting in delayed posterior transfer/transit. Adequate oral clearance noted. 2) Functional pharyngeal stage of swallow for minced and moist solids, puree, moderately thick, mildly thick, and thin liquids marked by initiation of pharyngeal swallow trigger and hyolaryngeal excursion noted upon digital palpation. No overt signs/symptoms of penetration/aspiration noted.

## 2024-08-06 NOTE — PROGRESS NOTE ADULT - SUBJECTIVE AND OBJECTIVE BOX
[facility] -- Vaughn Prieto Pannella, Patel, Savella, Goodger, Cohen  Office # [phone]      Follow Up:  elevated proBNP    Subjective/Observations: Seen and examined in his room with his family member present.  Pt confused by pleasant unable to respond verbally.  Per spouse feeling better and ate this morning.  He was lying in bed with no signs of distress.  No signs of orthopnea or PND. Pt on Room air. NAD.       REVIEW OF SYSTEMS: All other review of systems is negative unless indicated above    PAST MEDICAL & SURGICAL HISTORY:  Hypertension      Hyperlipidemia      Anxiety      DM (diabetes mellitus)      Dementia      Malignant melanoma of left lower limb, including hip      Mild CAD  carotids      Cancer of skin      History of kidney surgery  remove cancer      H/O inguinal hernia repair      H/O shoulder surgery          MEDICATIONS  (STANDING):  albuterol    0.083% 2.5 milliGRAM(s) Nebulizer every 12 hours  amLODIPine   Tablet 2.5 milliGRAM(s) Oral at bedtime  artificial tears (preservative free) Ophthalmic Solution 1 Drop(s) Both EYES four times a day  atorvastatin 80 milliGRAM(s) Oral at bedtime  benzonatate 100 milliGRAM(s) Oral three times a day  enoxaparin Injectable 40 milliGRAM(s) SubCutaneous every 24 hours  escitalopram 10 milliGRAM(s) Oral daily  guaiFENesin Oral Liquid (Sugar-Free) 100 milliGRAM(s) Oral every 6 hours  sodium chloride 3%  Inhalation 4 milliLiter(s) Inhalation every 12 hours    MEDICATIONS  (PRN):  acetaminophen     Tablet .. 650 milliGRAM(s) Oral every 6 hours PRN Temp greater or equal to 38C (100.4F), Mild Pain (1 - 3)  aluminum hydroxide/magnesium hydroxide/simethicone Suspension 30 milliLiter(s) Oral every 4 hours PRN Dyspepsia  melatonin 3 milliGRAM(s) Oral at bedtime PRN Insomnia  ondansetron Injectable 4 milliGRAM(s) IV Push every 8 hours PRN Nausea and/or Vomiting      Allergies    amoxicillin (Unknown)  enalapril (Unknown)    Intolerances            Vital Signs Last 24 Hrs  T(C): 37.1 (06 Aug 2024 06:20), Max: 37.1 (06 Aug 2024 06:20)  T(F): 98.8 (06 Aug 2024 06:20), Max: 98.8 (06 Aug 2024 06:20)  HR: 75 (06 Aug 2024 08:15) (70 - 88)  BP: 113/59 (06 Aug 2024 06:20) (112/73 - 133/73)  BP(mean): --  RR: 18 (06 Aug 2024 06:20) (17 - 18)  SpO2: 95% (06 Aug 2024 08:15) (94% - 96%)    Parameters below as of 06 Aug 2024 08:15  Patient On (Oxygen Delivery Method): room air        I&O's Summary    05 Aug 2024 07:01  -  06 Aug 2024 07:00  --------------------------------------------------------  IN: 0 mL / OUT: 500 mL / NET: -500 mL          PHYSICAL EXAM:  TELE: Not on tele  Constitutional: NAD, awake and alert, well-developed, frail  HEENT: Moist Mucous Membranes, Anicteric  Pulmonary: Non-labored, breath sounds are clear anteriorly, No wheezing, rales or rhonchi  Cardiovascular: Regular, S1 and S2, +systolic murmur, No rubs, gallops or clicks  Gastrointestinal: Bowel Sounds present, soft, nontender.   Lymph: No peripheral edema. No lymphadenopathy.  Skin: No visible rashes or ulcers.  Psych:  Mood & affect unable to verbalize effectively    LABS: All Labs Reviewed:                        10.2   9.32  )-----------( 433      ( 05 Aug 2024 07:42 )             29.4                         11.3   15.98 )-----------( 477      ( 04 Aug 2024 06:22 )             32.1     05 Aug 2024 07:42    144    |  109    |  20     ----------------------------<  179    3.1     |  30     |  0.44   04 Aug 2024 06:22    142    |  107    |  25     ----------------------------<  202    3.5     |  27     |  0.54     Ca    8.1        05 Aug 2024 07:42  Ca    8.1        04 Aug 2024 06:22  Mg     1.7       05 Aug 2024 07:42  Mg     1.6       04 Aug 2024 06:22    TPro  5.3    /  Alb  2.4    /  TBili  1.0    /  DBili  0.3    /  AST  22     /  ALT  22     /  AlkPhos  86     06 Aug 2024 07:00  TPro  5.5    /  Alb  2.8    /  TBili  0.7    /  DBili  0.2    /  AST  31     /  ALT  27     /  AlkPhos  101    04 Aug 2024 06:22    Ventricular Rate 54 BPM    Atrial Rate 54 BPM    P-R Interval 218 ms    QRS Duration 90 ms    Q-T Interval 432 ms    QTC Calculation(Bazett) 409 ms    P Axis 98 degrees    R Axis 49 degrees    T Axis 82 degrees    Diagnosis Line Sinus bradycardia with 1st degree AV block  Low voltage QRS  Septal infarct , age undetermined  Confirmed by ALVARO INMAN ([id number]) on 7/31/2024 11:45:38 AM    TRANSTHORACIC ECHOCARDIOGRAM REPORT  ________________________________________________________________________________                                      _______       Pt. Name:       DUSTIN JUNIOR Study Date:    8/5/2024  MRN:            [id number] YOB: 1932  Accession #:    [id number]   Age:           89 years  Account#:       [id number]  Gender:        M  Heart Rate:                 Height:        68.90 in (175.00 cm)  Rhythm:                     Weight:        110.23 lb (50.00kg)  Blood Pressure: 133/73 mmHg BSA/BMI:       1.60 m² / 16.33 kg/m²  ________________________________________________________________________________________  Referring Physician:    [id number] [place] Elizabeth Physician: Alvaro Brooks  Primary Sonographer:    Erich Neri    CPT:               ECHO TTE WO CON COMP W DOPP - 83392.m  Indication(s):     Dyspnea, unspecified - R06.00  Procedure:         Transthoracic echocardiogram with 2-D, M-mode and complete  spectral and color flow Doppler.  Ordering Location: [facility]  Admission Status:  Inpatient  Study Information: Image quality for this study is technically difficult.    _______________________________________________________________________________________     CONCLUSIONS:      1. Technically difficult image quality as patient is agitated and confused.   2. Left ventricular systolic function is hyperdynamic with an ejection fraction visually estimated at 70 to 75 %.   3. There is mild (grade 1) leftventricular diastolic dysfunction.   4. Normal right ventricular cavity size and normal right ventricular systolic function.   5. Left atrium is normal in size.   6. There is calcification of the aortic valve leaflets.   7. Mild aortic regurgitation.   8. There is calcification of the mitral valve annulus.   9. Mitral valve leaflets have focal calcification.  10. Moderate mitral regurgitation.    ________________________________________________________________________________________  FINDINGS:    Left Ventricle:  Left ventricular systolic function is hyperdynamic with an ejection fraction visually estimated at 70 to 75%. There is mild (grade 1) left ventricular diastolic dysfunction.     Right Ventricle:  The right ventricular cavity is normal in size and right ventricular systolic function is normal. Tricuspid annular plane systolic excursion (TAPSE) is 3.0 cm (normal >=1.7 cm).     Left Atrium:  The left atrium is normal in size with an indexed volume of 25.10 ml/m².     Right Atrium:  The right atrium is normal in size.     Aortic Valve:  The aortic valve was not well visualized. There is calcification of the aortic valve leaflets. There is mild aortic regurgitation. AI VMax is 3.73 m/s. AI pressure half time is 417 msec. AI slope is 2.35 m/s².     Mitral Valve:  There is calcification of the mitral valve annulus. Mitral valve leaflets have focal calcification. The transmitral peak gradient is 10.8 mmHg and mean gradient is 3.00 mmHg. There is moderate mitral regurgitation.    Tricuspid Valve:  There is trace tricuspid regurgitation. Estimated pulmonary artery systolic pressure is 32 mmHg.     Pulmonic Valve:  The pulmonic valve was not well visualized. There is trace pulmonic regurgitation.     Aorta:  The aortic root at the sinuses of Valsalva is normal in size, measuring 3.50 cm (indexed 2.18 cm/m²).     Systemic Veins:  The inferior vena cava is normal in size measuring 0.76 cm in diameter, (normal <2.1cm) with normal inspiratory collapse (normal >50%) consistentwith normal right atrial pressure (~3, range 0-5mmHg).  ____________________________________________________________________  QUANTITATIVE DATA:  Left Ventricle Measurements: (Indexed to BSA)     IVSd (2D):   0.9 cm  LVPWd (2D):  0.9 cm  LVIDd (2D):3.2 cm  LVIDs (2D):  1.8 cm  LV Mass:     79 g   49.4 g/m²  Visualized LV EF%: 70 to 75%     MV E Vmax:    0.82 m/s  MV A Vmax:    1.35 m/s  MV E/A:       0.61  e' lateral:   14.00 cm/s  e' medial:    9.36 cm/s  E/e' lateral: 5.85  E/e' medial:  8.75  E/e' Average: 7.01  MV DT:        254 msec    Aorta Measurements: (Normal range) (Indexed to BSA)     Ao Root d 3.50 cm (3.1 - 3.7 cm) 2.18 cm/m²           Right Ventricle Measurements:     TAPSE: 3.0 cm       LVOT / RVOT/ Qp/Qs Data: (Indexed to BSA)  LVOT Diameter: 2.20 cm  LVOT Area:     3.80 cm²    Aortic Valve Measurements:  AR Vmax  3.73 m/s  AR PHT   417 msec  AR [place] 2.35 m/s²    Mitral Valve Measurements:     MV Vmax:      1.64 m/s     MR Vmax:          5.47 m/s  MV VTI, delio   0.326 m    MR Peak Gradient: 119.7 mmHg  MV Mean Grad: 3.0 mmHg  MV Peak Grad: 10.8 mmHg  MV E Vmax:    0.8 m/s  MV A Vmax:    1.4 m/s  MV E/A:       0.6       Tricuspid Valve Measurements:     TR Vmax:          2.7 m/s  TR Peak Gradient: 28.5 mmHg  RA Pressure:      3 mmHg  PASP:             32 mmHg    ________________________________________________________________________________________  Electronically signed on 8/6/2024 at 7:22:13 AM by Alvaro Inman MD         *** Final ***    ACC: [id number] EXAM:  XR CHEST PORTABLE URGENT 1V   ORDERED BY: CUBA MOONEY     PROCEDURE DATE:  08/04/2024          INTERPRETATION:  Portable AP chest radiograph    COMPARISON: 7/31/2024 chest x-ray.    CLINICAL INFORMATION: Shortness of breath.    FINDINGS:  CATHETERS AND TUBES: None    PULMONARY:  No large airspace consolidation, pleural effusion and/or evidence of   pneumothorax... Patient's hand and arm obscures RIGHT and LEFT lower   zones.  No pleural effusion or pneumothorax.    HEART/VASCULAR: The heart size and mediastinum configuration are within   the limits of normal.    BONES: Chronic LEFT humeral neck fracture with medial displacement of   distal fracture segment.      IMPRESSION: Lung bases obscured by patient's arm and hand.  No large airspace consolidation. No pneumothorax.  Lateral radiograph recommended to evaluate posterior lung bases.    --- End of Report ---            NORMAN HARRY MD; Attending Radiologist  This document has been electronically signed. Aug  4 2024 10:21AM                    [facility] -- Vaughn Prieto Pannella, Patel, Savella, Goodger, Cohen  Office # [phone]      Follow Up:  elevated proBNP    Subjective/Observations: Seen and examined in his room with his family member present.  Pt confused by pleasant unable to respond verbally.  Per spouse feeling better and ate this morning.  He was lying in bed with no signs of distress.  No signs of orthopnea or PND. Pt on Room air. NAD.       REVIEW OF SYSTEMS: All other review of systems is negative unless indicated above    PAST MEDICAL & SURGICAL HISTORY:  Hypertension      Hyperlipidemia      Anxiety      DM (diabetes mellitus)      Dementia      Malignant melanoma of left lower limb, including hip      Mild CAD  carotids      Cancer of skin      History of kidney surgery  remove cancer      H/O inguinal hernia repair      H/O shoulder surgery          MEDICATIONS  (STANDING):  albuterol    0.083% 2.5 milliGRAM(s) Nebulizer every 12 hours  amLODIPine   Tablet 2.5 milliGRAM(s) Oral at bedtime  artificial tears (preservative free) Ophthalmic Solution 1 Drop(s) Both EYES four times a day  atorvastatin 80 milliGRAM(s) Oral at bedtime  benzonatate 100 milliGRAM(s) Oral three times a day  enoxaparin Injectable 40 milliGRAM(s) SubCutaneous every 24 hours  escitalopram 10 milliGRAM(s) Oral daily  guaiFENesin Oral Liquid (Sugar-Free) 100 milliGRAM(s) Oral every 6 hours  sodium chloride 3%  Inhalation 4 milliLiter(s) Inhalation every 12 hours    MEDICATIONS  (PRN):  acetaminophen     Tablet .. 650 milliGRAM(s) Oral every 6 hours PRN Temp greater or equal to 38C (100.4F), Mild Pain (1 - 3)  aluminum hydroxide/magnesium hydroxide/simethicone Suspension 30 milliLiter(s) Oral every 4 hours PRN Dyspepsia  melatonin 3 milliGRAM(s) Oral at bedtime PRN Insomnia  ondansetron Injectable 4 milliGRAM(s) IV Push every 8 hours PRN Nausea and/or Vomiting      Allergies    amoxicillin (Unknown)  enalapril (Unknown)    Intolerances            Vital Signs Last 24 Hrs  T(C): 37.1 (06 Aug 2024 06:20), Max: 37.1 (06 Aug 2024 06:20)  T(F): 98.8 (06 Aug 2024 06:20), Max: 98.8 (06 Aug 2024 06:20)  HR: 75 (06 Aug 2024 08:15) (70 - 88)  BP: 113/59 (06 Aug 2024 06:20) (112/73 - 133/73)  BP(mean): --  RR: 18 (06 Aug 2024 06:20) (17 - 18)  SpO2: 95% (06 Aug 2024 08:15) (94% - 96%)    Parameters below as of 06 Aug 2024 08:15  Patient On (Oxygen Delivery Method): room air        I&O's Summary    05 Aug 2024 07:01  -  06 Aug 2024 07:00  --------------------------------------------------------  IN: 0 mL / OUT: 500 mL / NET: -500 mL          PHYSICAL EXAM:  TELE: Not on tele  Constitutional: NAD, awake and alert, well-developed, frail  HEENT: Moist Mucous Membranes, Anicteric  Pulmonary: Non-labored, breath sounds are clear anteriorly, No wheezing, rales or rhonchi  Cardiovascular: Regular, S1 and S2, +systolic murmur, No rubs, gallops or clicks  Gastrointestinal: Bowel Sounds present, soft, nontender.   Lymph: No peripheral edema. No lymphadenopathy.  Skin: No visible rashes or ulcers.  Psych:  Mood & affect unable to verbalize effectively    LABS: All Labs Reviewed:                        10.2   9.32  )-----------( 433      ( 05 Aug 2024 07:42 )             29.4                         11.3   15.98 )-----------( 477      ( 04 Aug 2024 06:22 )             32.1     05 Aug 2024 07:42    144    |  109    |  20     ----------------------------<  179    3.1     |  30     |  0.44   04 Aug 2024 06:22    142    |  107    |  25     ----------------------------<  202    3.5     |  27     |  0.54     Ca    8.1        05 Aug 2024 07:42  Ca    8.1        04 Aug 2024 06:22  Mg     1.7       05 Aug 2024 07:42  Mg     1.6       04 Aug 2024 06:22    TPro  5.3    /  Alb  2.4    /  TBili  1.0    /  DBili  0.3    /  AST  22     /  ALT  22     /  AlkPhos  86     06 Aug 2024 07:00  TPro  5.5    /  Alb  2.8    /  TBili  0.7    /  DBili  0.2    /  AST  31     /  ALT  27     /  AlkPhos  101    04 Aug 2024 06:22    Ventricular Rate 54 BPM    Atrial Rate 54 BPM    P-R Interval 218 ms    QRS Duration 90 ms    Q-T Interval 432 ms    QTC Calculation(Bazett) 409 ms    P Axis 98 degrees    R Axis 49 degrees    T Axis 82 degrees    Diagnosis Line Sinus bradycardia with 1st degree AV block  Low voltage QRS  Septal infarct , age undetermined  Confirmed by ALVARO INMAN ([id number]) on 7/31/2024 11:45:38 AM    TRANSTHORACIC ECHOCARDIOGRAM REPORT  ________________________________________________________________________________                                      _______       Pt. Name:       DUSTIN JUNIOR Study Date:    8/5/2024  MRN:            [id number] YOB: 1932  Accession #:    [id number]   Age:           89 years  Account#:       [id number]  Gender:        M  Heart Rate:                 Height:        68.90 in (175.00 cm)  Rhythm:                     Weight:        110.23 lb (50.00kg)  Blood Pressure: 133/73 mmHg BSA/BMI:       1.60 m² / 16.33 kg/m²  ________________________________________________________________________________________  Referring Physician:    [id number] [place] Elizabeth Physician: Alvaro Brooks  Primary Sonographer:    Erich Neri    CPT:               ECHO TTE WO CON COMP W DOPP - 73537.m  Indication(s):     Dyspnea, unspecified - R06.00  Procedure:         Transthoracic echocardiogram with 2-D, M-mode and complete  spectral and color flow Doppler.  Ordering Location: [facility]  Admission Status:  Inpatient  Study Information: Image quality for this study is technically difficult.    _______________________________________________________________________________________     CONCLUSIONS:      1. Technically difficult image quality as patient is agitated and confused.   2. Left ventricular systolic function is hyperdynamic with an ejection fraction visually estimated at 70 to 75 %.   3. There is mild (grade 1) leftventricular diastolic dysfunction.   4. Normal right ventricular cavity size and normal right ventricular systolic function.   5. Left atrium is normal in size.   6. There is calcification of the aortic valve leaflets.   7. Mild aortic regurgitation.   8. There is calcification of the mitral valve annulus.   9. Mitral valve leaflets have focal calcification.  10. Moderate mitral regurgitation.    ________________________________________________________________________________________  FINDINGS:    Left Ventricle:  Left ventricular systolic function is hyperdynamic with an ejection fraction visually estimated at 70 to 75%. There is mild (grade 1) left ventricular diastolic dysfunction.     Right Ventricle:  The right ventricular cavity is normal in size and right ventricular systolic function is normal. Tricuspid annular plane systolic excursion (TAPSE) is 3.0 cm (normal >=1.7 cm).     Left Atrium:  The left atrium is normal in size with an indexed volume of 25.10 ml/m².     Right Atrium:  The right atrium is normal in size.     Aortic Valve:  The aortic valve was not well visualized. There is calcification of the aortic valve leaflets. There is mild aortic regurgitation. AI VMax is 3.73 m/s. AI pressure half time is 417 msec. AI slope is 2.35 m/s².     Mitral Valve:  There is calcification of the mitral valve annulus. Mitral valve leaflets have focal calcification. The transmitral peak gradient is 10.8 mmHg and mean gradient is 3.00 mmHg. There is moderate mitral regurgitation.    Tricuspid Valve:  There is trace tricuspid regurgitation. Estimated pulmonary artery systolic pressure is 32 mmHg.     Pulmonic Valve:  The pulmonic valve was not well visualized. There is trace pulmonic regurgitation.     Aorta:  The aortic root at the sinuses of Valsalva is normal in size, measuring 3.50 cm (indexed 2.18 cm/m²).     Systemic Veins:  The inferior vena cava is normal in size measuring 0.76 cm in diameter, (normal <2.1cm) with normal inspiratory collapse (normal >50%) consistentwith normal right atrial pressure (~3, range 0-5mmHg).  ____________________________________________________________________  QUANTITATIVE DATA:  Left Ventricle Measurements: (Indexed to BSA)     IVSd (2D):   0.9 cm  LVPWd (2D):  0.9 cm  LVIDd (2D):3.2 cm  LVIDs (2D):  1.8 cm  LV Mass:     79 g   49.4 g/m²  Visualized LV EF%: 70 to 75%     MV E Vmax:    0.82 m/s  MV A Vmax:    1.35 m/s  MV E/A:       0.61  e' lateral:   14.00 cm/s  e' medial:    9.36 cm/s  E/e' lateral: 5.85  E/e' medial:  8.75  E/e' Average: 7.01  MV DT:        254 msec    Aorta Measurements: (Normal range) (Indexed to BSA)     Ao Root d 3.50 cm (3.1 - 3.7 cm) 2.18 cm/m²           Right Ventricle Measurements:     TAPSE: 3.0 cm       LVOT / RVOT/ Qp/Qs Data: (Indexed to BSA)  LVOT Diameter: 2.20 cm  LVOT Area:     3.80 cm²    Aortic Valve Measurements:  AR Vmax  3.73 m/s  AR PHT   417 msec  AR [place] 2.35 m/s²    Mitral Valve Measurements:     MV Vmax:      1.64 m/s     MR Vmax:          5.47 m/s  MV VTI, delio   0.326 m    MR Peak Gradient: 119.7 mmHg  MV Mean Grad: 3.0 mmHg  MV Peak Grad: 10.8 mmHg  MV E Vmax:    0.8 m/s  MV A Vmax:    1.4 m/s  MV E/A:       0.6       Tricuspid Valve Measurements:     TR Vmax:          2.7 m/s  TR Peak Gradient: 28.5 mmHg  RA Pressure:      3 mmHg  PASP:             32 mmHg    ________________________________________________________________________________________  Electronically signed on 8/6/2024 at 7:22:13 AM by Alvaro Inman MD         *** Final ***    ACC: [id number] EXAM:  XR CHEST PORTABLE URGENT 1V   ORDERED BY: CUBA MOONEY     PROCEDURE DATE:  08/04/2024          INTERPRETATION:  Portable AP chest radiograph    COMPARISON: 7/31/2024 chest x-ray.    CLINICAL INFORMATION: Shortness of breath.    FINDINGS:  CATHETERS AND TUBES: None    PULMONARY:  No large airspace consolidation, pleural effusion and/or evidence of   pneumothorax... Patient's hand and arm obscures RIGHT and LEFT lower   zones.  No pleural effusion or pneumothorax.    HEART/VASCULAR: The heart size and mediastinum configuration are within   the limits of normal.    BONES: Chronic LEFT humeral neck fracture with medial displacement of   distal fracture segment.      IMPRESSION: Lung bases obscured by patient's arm and hand.  No large airspace consolidation. No pneumothorax.  Lateral radiograph recommended to evaluate posterior lung bases.    --- End of Report ---            NORMAN HARRY MD; Attending Radiologist  This document has been electronically signed. Aug  4 2024 10:21AM

## 2024-08-06 NOTE — PROGRESS NOTE ADULT - PROBLEM SELECTOR PLAN 1
Airborne precautions  S/P remdesivir x 3 days   Repeat CXR today -- ?CHF, cannot r/o infiltrate (unofficial)  D/C IVF  Check ECHO  SLP eval pending  Lovenox 40mg sq qday  Tolerating room  On nebs -- refusing  Cough suppressants prn  ID/pulm f/u  Cardio consult
Airborne precautions  S/P remdesivir x 3 days   NO PNA, NO CHF  Lovenox 40mg sq qday  Tolerating room  On nebs -- refusing  Cough suppressants prn  ID/pulm f/u  Cardio consult noted  Clinically stable  D/C planning
Airborne precautions  Continue remdesivir x 3 days  (today day 3/3)  Lovenox 40mg sq qday  Continuous pulse oximetry  Oxygen if needed for SaO2 < 90%  Start decadron if patient becomes hypoxic  Cough suppressants prn  Monitor COVID serologies  Monitor off abx  Tylenol prn for fever  ID consult noted
Airborne precautions  S/P remdesivir x 3 days   Lovenox 40mg sq qday  Tolerating room  On nebs -- refusing  Cough suppressants prn  Monitor off abx  ID/pulm consults noted
Airborne precautions  Continue remdesivir x 3 days   Lovenox 40mg sq qday  Continuous pulse oximetry  Oxygen if needed for SaO2 < 90%  Start decadron if patient becomes hypoxic  Cough suppressants prn  Monitor COVID serologies  Monitor off abx  Tylenol prn for fever  ID consult noted  Further work-up/management pending clinical course.
Airborne precautions  S/P remdesivir x 3 days   NO PNA, NO CHF  Lovenox 40mg sq qday  Tolerating room  On nebs -- refusing  Cough suppressants prn  ID/pulm f/u  Cardio consult noted  Clinically stable  D/C home

## 2024-08-06 NOTE — PROGRESS NOTE ADULT - PROBLEM SELECTOR PLAN 2
Continue amlodipine

## 2024-08-06 NOTE — PROGRESS NOTE ADULT - PROVIDER SPECIALTY LIST ADULT
Pulmonology
Pulmonology
Infectious Disease
Pulmonology
Cardiology
Cardiology
Infectious Disease
Internal Medicine
Pulmonology
Infectious Disease
Infectious Disease
Internal Medicine

## 2024-08-06 NOTE — PROGRESS NOTE ADULT - SUBJECTIVE AND OBJECTIVE BOX
Date/Time Patient Seen:  		  Referring MD:   Data Reviewed	       Patient is a 89y old  Male who presents with a chief complaint of cough, not eating (05 Aug 2024 10:18)      Subjective/HPI     PAST MEDICAL & SURGICAL HISTORY:  Hypertension    Hyperlipidemia    Anxiety    DM (diabetes mellitus)    Dementia    Malignant melanoma of left lower limb, including hip    Mild CAD  carotids    Cancer of skin    No significant past surgical history    History of kidney surgery  remove cancer    H/O inguinal hernia repair    H/O shoulder surgery          Medication list         MEDICATIONS  (STANDING):  albuterol    0.083% 2.5 milliGRAM(s) Nebulizer every 12 hours  amLODIPine   Tablet 2.5 milliGRAM(s) Oral at bedtime  artificial tears (preservative free) Ophthalmic Solution 1 Drop(s) Both EYES four times a day  atorvastatin 80 milliGRAM(s) Oral at bedtime  benzonatate 100 milliGRAM(s) Oral three times a day  enoxaparin Injectable 40 milliGRAM(s) SubCutaneous every 24 hours  escitalopram 10 milliGRAM(s) Oral daily  guaiFENesin Oral Liquid (Sugar-Free) 100 milliGRAM(s) Oral every 6 hours  sodium chloride 3%  Inhalation 4 milliLiter(s) Inhalation every 12 hours    MEDICATIONS  (PRN):  acetaminophen     Tablet .. 650 milliGRAM(s) Oral every 6 hours PRN Temp greater or equal to 38C (100.4F), Mild Pain (1 - 3)  aluminum hydroxide/magnesium hydroxide/simethicone Suspension 30 milliLiter(s) Oral every 4 hours PRN Dyspepsia  melatonin 3 milliGRAM(s) Oral at bedtime PRN Insomnia  ondansetron Injectable 4 milliGRAM(s) IV Push every 8 hours PRN Nausea and/or Vomiting         Vitals log        ICU Vital Signs Last 24 Hrs  T(C): 37.1 (06 Aug 2024 06:20), Max: 37.1 (06 Aug 2024 06:20)  T(F): 98.8 (06 Aug 2024 06:20), Max: 98.8 (06 Aug 2024 06:20)  HR: 79 (06 Aug 2024 06:20) (70 - 88)  BP: 113/59 (06 Aug 2024 06:20) (112/73 - 133/73)  BP(mean): --  ABP: --  ABP(mean): --  RR: 18 (06 Aug 2024 06:20) (17 - 18)  SpO2: 94% (06 Aug 2024 06:20) (94% - 96%)    O2 Parameters below as of 06 Aug 2024 06:20  Patient On (Oxygen Delivery Method): room air                 Input and Output:  I&O's Detail    05 Aug 2024 07:01  -  06 Aug 2024 07:00  --------------------------------------------------------  IN:  Total IN: 0 mL    OUT:    Voided (mL): 500 mL  Total OUT: 500 mL    Total NET: -500 mL          Lab Data                        10.2   9.32  )-----------( 433      ( 05 Aug 2024 07:42 )             29.4     08-05    144  |  109<H>  |  20  ----------------------------<  179<H>  3.1<L>   |  30  |  0.44<L>    Ca    8.1<L>      05 Aug 2024 07:42  Mg     1.7     08-05              Review of Systems	      Objective     Physical Examination    heart s1s2  lung dc BS  head nc  frail  weak  elderly      Pertinent Lab findings & Imaging      Valery:  NO   Adequate UO     I&O's Detail    05 Aug 2024 07:01  -  06 Aug 2024 07:00  --------------------------------------------------------  IN:  Total IN: 0 mL    OUT:    Voided (mL): 500 mL  Total OUT: 500 mL    Total NET: -500 mL               Discussed with:     Cultures:	        Radiology

## 2024-08-06 NOTE — SWALLOW BEDSIDE ASSESSMENT ADULT - COMMENTS
Chart reviewed order received for swallow eval.  Attempted to see pt for swallow eval, however, pt currently with nursing receiving hygiene care.  Will follow to reattempt, as schedule permits, CULLEN Meyer & NP Lulu notified.
Per charting 8/6 "89 y/o male with dementia, htn, DM-II and CAD presented with acute COVID of <1 week duration prior to admission  RECOMMENDATIONS  COVID-19  sp Remdesivir x 3 days 7/31-8/2  avoid steroids  no clear indication for abx so obs off abx  avoid steroids  -remains on room air and very somnolent".    CXR 8/4 "Lung bases obscured by patient's arm and hand. No large airspace consolidation. No pneumothorax. Lateral radiograph recommended to evaluate posterior lung bases."    Patient seen at bedside, sleeping however arousbale to awake/alert state, during bedside swallow evaluation this PM. Patient's wife present at bedside. Patient noted with confusion and difficulty answering questions/following directions. Patient required verbal encouragement to accept PO trials.

## 2024-08-06 NOTE — PROGRESS NOTE ADULT - ASSESSMENT
89 y/o male with dementia, htn, DM-II and CAD presented with acute COVID of <1 week duration prior to admission    RECOMMENDATIONS  COVID-19  sp Remdesivir x 3 days 7/31-8/2  avoid steroids  no clear indication for abx so obs off abx  avoid steroids  -remains on room air and very somnolent  isolation: with diagnosis 7/31 8/10 as last day of isolation (wife had COVID prior to )    leukocytosis noted 8/4 but now down- will follow, noted CXR -Lung bases obscured by patient's arm and hand. No large airspace consolidation. No pneumothorax.    other issues will coordinate with medicine    Thank you for consulting us and involving us in the management of this most interesting and challenging case.  Please call us at [phone] or text me directly on my cell#[phone] with any concerns or further questions.    Thank you for consulting us and involving us in the management of this most interesting and challenging case.  We will follow along in the care of this patient. Please call us at [phone] or text me directly on my cell# at [phone] with any concerns.

## 2024-08-06 NOTE — PROGRESS NOTE ADULT - ASSESSMENT
89 y/o male with a PMHx of dementia, HTN, HLD, T2DM  presented to the ED with decreased food and water intake found to be positive for COVID      Elev BNP  - -> 1137, likely in the setting of acute infection, found to be COVID+  - supplemental medical hx was provided by wife at bedside, states that he never had any heart condition or issues in the past and patient does not see any cardiologist  - no significant sign of volume overload, appears dry on exam, no O2 requirement  - as per wife he had lost weight since admission and now weighs 109 lbs <- 120 lbs prior to admission, likely 2/2 to poor food /fluid intake   - Pt was able to eat this am.     - + sys murmur during exam, per wife, unaware of it   - TTE - 8/5/24 LVEF 70-75%, LV DD grade I, mild AR, mod MR.   - CXR noted lung bases obscured by pts arm and hand, otherwise no acute pathology  - Continue to monitor strict intake and output, appears to be good diuresis currently.    - BP, HR stable and controlled per flow sheet  - continue amlodipine     - EKG SB with 1st deg AVB  - no acute ischemia noted  - no anginal complaints     - +COVID, s/p remdesivir, ID following  - iso and rest of care per primary    - dc planning per primary team for today.  No objection for discharge from a cardiology POV.  Followed up in the office with Dr. Abarca.  Should followup with Dr. Mendes outpt 2 week post discharge.   - Monitor and replete lytes, keep K>4, Mg>2.  K3.1 this am and was supplemented.  - Will continue to follow.    BREN Falk-BC  Nurse Practitioner- Cardiology   Call TEAMS

## 2024-08-16 ENCOUNTER — APPOINTMENT (OUTPATIENT)
Dept: HOME HEALTH SERVICES | Facility: HOME HEALTH | Age: 89
End: 2024-08-16
Payer: MEDICARE

## 2024-08-16 VITALS
TEMPERATURE: 98.2 F | SYSTOLIC BLOOD PRESSURE: 156 MMHG | HEART RATE: 67 BPM | RESPIRATION RATE: 18 BRPM | OXYGEN SATURATION: 98 % | DIASTOLIC BLOOD PRESSURE: 69 MMHG

## 2024-08-16 DIAGNOSIS — M81.0 AGE-RELATED OSTEOPOROSIS W/OUT CURRENT PATHOLOGICAL FRACTURE: ICD-10-CM

## 2024-08-16 DIAGNOSIS — Z71.89 OTHER SPECIFIED COUNSELING: ICD-10-CM

## 2024-08-16 DIAGNOSIS — Z87.39 PERSONAL HISTORY OF OTHER DISEASES OF THE MUSCULOSKELETAL SYSTEM AND CONNECTIVE TISSUE: ICD-10-CM

## 2024-08-16 DIAGNOSIS — H61.23 IMPACTED CERUMEN, BILATERAL: ICD-10-CM

## 2024-08-16 DIAGNOSIS — J34.89 OTHER SPECIFIED DISORDERS OF NOSE AND NASAL SINUSES: ICD-10-CM

## 2024-08-16 DIAGNOSIS — F03.93 UNSPECIFIED DEMENTIA, UNSPECIFIED SEVERITY, WITH MOOD DISTURBANCE: ICD-10-CM

## 2024-08-16 DIAGNOSIS — Z87.09 PERSONAL HISTORY OF OTHER DISEASES OF THE RESPIRATORY SYSTEM: ICD-10-CM

## 2024-08-16 DIAGNOSIS — E78.5 HYPERLIPIDEMIA, UNSPECIFIED: ICD-10-CM

## 2024-08-16 DIAGNOSIS — R04.0 EPISTAXIS: ICD-10-CM

## 2024-08-16 DIAGNOSIS — M48.50XA COLLAPSED VERTEBRA, NOT ELSEWHERE CLASSIFIED, SITE UNSPECIFIED, INITIAL ENCOUNTER FOR FRACTURE: ICD-10-CM

## 2024-08-16 DIAGNOSIS — R53.2 FUNCTIONAL QUADRIPLEGIA: ICD-10-CM

## 2024-08-16 DIAGNOSIS — I77.9 DISORDER OF ARTERIES AND ARTERIOLES, UNSPECIFIED: ICD-10-CM

## 2024-08-16 DIAGNOSIS — I10 ESSENTIAL (PRIMARY) HYPERTENSION: ICD-10-CM

## 2024-08-16 PROCEDURE — G0506: CPT

## 2024-08-16 PROCEDURE — 99345 HOME/RES VST NEW HIGH MDM 75: CPT

## 2024-08-16 PROCEDURE — 99497 ADVNCD CARE PLAN 30 MIN: CPT

## 2024-08-16 RX ORDER — AMLODIPINE BESYLATE 2.5 MG/1
2.5 TABLET ORAL
Qty: 90 | Refills: 3 | Status: ACTIVE | COMMUNITY
Start: 2024-08-16 | End: 1900-01-01

## 2024-08-16 RX ORDER — FLUVASTATIN SODIUM 80 MG/1
80 TABLET, EXTENDED RELEASE ORAL DAILY
Qty: 90 | Refills: 3 | Status: ACTIVE | COMMUNITY
Start: 2024-08-16 | End: 1900-01-01

## 2024-08-16 RX ORDER — ESCITALOPRAM OXALATE 10 MG/1
10 TABLET ORAL
Qty: 90 | Refills: 3 | Status: ACTIVE | COMMUNITY
Start: 2024-08-16 | End: 1900-01-01

## 2024-08-16 NOTE — HISTORY OF PRESENT ILLNESS
[Spouse] : spouse [FreeTextEntry1] : dementia [FreeTextEntry2] : DUSTIN JUNIOR is a 89 year M presenting as a new patient to establish care. Accompanied by Joanne, wife. [place] vet.    Follows with: Dr Rafat Londono - former PCP   Active medical problems: #Dementia with anxiety - escitalopram 10mg daily #HTN - amlodipine 2.5mg daily  #HLD - fluvastatin 80mg dialy #preDM #Dislocated L shoulder s/p reduction #Osteoporosis - vit D3   Recent hospitalizations: As per chart   Past surgical hx: hernia repair kidney surgery   Medications: Amlodipine 2.5mg dialy Escitalopram 10mg daily Fluvastatin 80mg daily  Vit D3 2000u daily Multivitamin Primary Pharmacy: [facility] on on [address] in [place]/ [facility]   Allergies: Amoxicillin    Family history: Father - heart disease   Immunizations: Flu - Oct 2023 PNA - does not know exact date Covid x4   Vision: Used to wear glasses for distance Hearing: used to go to ENT for wax removal. generally ok.  Gait/Falls/Assisted devices: bedbound/ wheelchair bound. One fall with injury in the last year.  Skin: skin abrasion on R arm.  Pain: pain on contracted leg.  Appetite: eating is good. Supplements with ensure. on easy to chew. Has dentures but not using. lost a lot of weight 109lb at hospital, used to be 130.  BM: regular, incontinent.  Urine: incontinent Sleep: awake at night and sleeps during the day Mood: Good mood. No agitation   Social: Lives with wife. 2 sons.  HHA: x2 8 hour shifts. 11am-7am and 11pm to 7am. private ADL: able to feed himself sometimes,  IADLs: dependent DME: reclining wheelchair, hospital bed, rosi lift, commode, walker.  Prior/current profession: real estate appraisor Enjoys: HHA will play with him - dominoes, cards, conversation   Smoking, alcohol, drugs: none HCP: Completed already - Wife, and one of the sons is on it - antonino.  MOLST: DNR/DNI/hosp/use ivf and abx, no ft or dialysis. comfort care

## 2024-08-16 NOTE — REASON FOR VISIT
[Initial Eval - Existing Diagnosis] : an initial evaluation of an existing diagnosis [Spouse] : spouse [Pre-Visit Preparation] : pre-visit preparation was done [FreeTextEntry1] : Dementia [FreeTextEntry2] : chart review

## 2024-08-16 NOTE — COUNSELING
[Underweight - ( BMI  <18.5 )] : underweight - ( BMI  <18.5 ) [Continue diet as tolerated] : continue diet as tolerated based on goals of care [Non - Smoker] : non-smoker [Smoke/CO Detectors] : smoke/CO detectors [Use grab bars] : use grab bars [Remove clutter and unsafe carpeting to avoid falls] : remove clutter and unsafe carpeting to avoid falls [] : abdominal aortic ultrasound [Decrease hospital use] : decrease hospital use [Minimize unnecessary interventions] : minimize unnecessary interventions [Comfort Care] : comfort care [Discussed disease trajectory with patient/caregiver] : discussed disease trajectory with patient/caregiver [Likely to achieve goals/desired outcomes] : likely to achieve goals/desired outcomes [Patient/Caregiver has ___ understanding of disease process] : patient/caregiver has [unfilled] understanding of disease process [Advanced Directives discussed: ____] : Advanced directives discussed: [unfilled] [Completed Medical Orders for Life-Sustaining Treatment] : completed medical orders for life-sustaining treatment [DNR] : Code Status: DNR [Comfort] : Treatment Guidelines: Comfort [DNI] : Intubation: DNI [[facility]: _____] [facility] [unfilled] [_____] : HCP: [unfilled] [ - New patient with 2 or more chronic conditions; CCM discussed and patient-centered care plan established] : New patient with 2 or more chronic conditions; CCM discussed and patient-centered care plan established

## 2024-08-16 NOTE — PHYSICAL EXAM
[No Acute Distress] : no acute distress [Normal Sclera/Conjunctiva] : normal sclera/conjunctiva [EOMI] : extra ocular movement intact [Normal Outer Ear/Nose] : the ears and nose were normal in appearance [Normal Oropharynx] : the oropharynx was normal [No Respiratory Distress] : no respiratory distress [Clear to Auscultation] : lungs were clear to auscultation bilaterally [No Accessory Muscle Use] : no accessory muscle use [Normal Rate] : heart rate was normal  [Regular Rhythm] : with a regular rhythm [Normal S1, S2] : normal S1 and S2 [No Murmurs] : no murmurs heard [No Edema] : there was no peripheral edema [Normal Bowel Sounds] : normal bowel sounds [Non Tender] : non-tender [Soft] : abdomen soft [Not Distended] : not distended [Normal Post Cervical Nodes] : no posterior cervical lymphadenopathy [Normal Anterior Cervical Nodes] : no anterior cervical lymphadenopathy [No CVA Tenderness] : no ~M costovertebral angle tenderness [No Spinal Tenderness] : no spinal tenderness [No Rash] : no rash [Normal Affect] : the affect was normal [de-identified] : temporal wasting [de-identified] : Unilateral leg contracture [de-identified] : unable to assess

## 2024-08-20 ENCOUNTER — LABORATORY RESULT (OUTPATIENT)
Age: 89
End: 2024-08-20

## 2024-08-21 DIAGNOSIS — E11.9 TYPE 2 DIABETES MELLITUS W/OUT COMPLICATIONS: ICD-10-CM

## 2024-08-21 DIAGNOSIS — R73.03 PREDIABETES.: ICD-10-CM

## 2024-08-29 ENCOUNTER — TRANSCRIPTION ENCOUNTER (OUTPATIENT)
Age: 89
End: 2024-08-29

## 2024-09-23 ENCOUNTER — NON-APPOINTMENT (OUTPATIENT)
Age: 89
End: 2024-09-23

## 2024-09-23 ENCOUNTER — TRANSCRIPTION ENCOUNTER (OUTPATIENT)
Age: 89
End: 2024-09-23

## 2024-10-01 DIAGNOSIS — Z23 ENCOUNTER FOR IMMUNIZATION: ICD-10-CM

## 2024-10-04 ENCOUNTER — MED ADMIN CHARGE (OUTPATIENT)
Age: 89
End: 2024-10-04

## 2024-10-04 ENCOUNTER — APPOINTMENT (OUTPATIENT)
Dept: HOME HEALTH SERVICES | Facility: HOME HEALTH | Age: 89
End: 2024-10-04

## 2024-10-04 VITALS
OXYGEN SATURATION: 96 % | HEART RATE: 66 BPM | SYSTOLIC BLOOD PRESSURE: 128 MMHG | RESPIRATION RATE: 17 BRPM | DIASTOLIC BLOOD PRESSURE: 58 MMHG | TEMPERATURE: 97.4 F

## 2024-10-14 ENCOUNTER — TRANSCRIPTION ENCOUNTER (OUTPATIENT)
Age: 89
End: 2024-10-14

## 2024-10-14 ENCOUNTER — INPATIENT (INPATIENT)
Facility: HOSPITAL | Age: 89
LOS: 0 days | Discharge: HOME | DRG: 690 | End: 2024-10-15
Attending: INTERNAL MEDICINE | Admitting: INTERNAL MEDICINE
Payer: COMMERCIAL

## 2024-10-14 VITALS
WEIGHT: 110.01 LBS | RESPIRATION RATE: 16 BRPM | SYSTOLIC BLOOD PRESSURE: 113 MMHG | HEIGHT: 69 IN | OXYGEN SATURATION: 95 % | TEMPERATURE: 98 F | DIASTOLIC BLOOD PRESSURE: 55 MMHG | HEART RATE: 52 BPM

## 2024-10-14 DIAGNOSIS — Z98.890 OTHER SPECIFIED POSTPROCEDURAL STATES: Chronic | ICD-10-CM

## 2024-10-14 DIAGNOSIS — N39.0 URINARY TRACT INFECTION, SITE NOT SPECIFIED: ICD-10-CM

## 2024-10-14 LAB
ALBUMIN SERPL ELPH-MCNC: 3.3 G/DL — SIGNIFICANT CHANGE UP (ref 3.3–5)
ALP SERPL-CCNC: 102 U/L — SIGNIFICANT CHANGE UP (ref 40–120)
ALT FLD-CCNC: 28 U/L — SIGNIFICANT CHANGE UP (ref 12–78)
ANION GAP SERPL CALC-SCNC: 6 MMOL/L — SIGNIFICANT CHANGE UP (ref 5–17)
APPEARANCE UR: ABNORMAL
APTT BLD: 34.8 SEC — SIGNIFICANT CHANGE UP (ref 24.5–35.6)
AST SERPL-CCNC: 23 U/L — SIGNIFICANT CHANGE UP (ref 15–37)
BASOPHILS # BLD AUTO: 0 K/UL — SIGNIFICANT CHANGE UP (ref 0–0.2)
BASOPHILS NFR BLD AUTO: 0 % — SIGNIFICANT CHANGE UP (ref 0–2)
BILIRUB SERPL-MCNC: 0.3 MG/DL — SIGNIFICANT CHANGE UP (ref 0.2–1.2)
BILIRUB UR-MCNC: NEGATIVE — SIGNIFICANT CHANGE UP
BUN SERPL-MCNC: 26 MG/DL — HIGH (ref 7–23)
CALCIUM SERPL-MCNC: 8.8 MG/DL — SIGNIFICANT CHANGE UP (ref 8.5–10.1)
CHLORIDE SERPL-SCNC: 103 MMOL/L — SIGNIFICANT CHANGE UP (ref 96–108)
CO2 SERPL-SCNC: 30 MMOL/L — SIGNIFICANT CHANGE UP (ref 22–31)
COLOR SPEC: YELLOW — SIGNIFICANT CHANGE UP
CREAT SERPL-MCNC: 0.5 MG/DL — SIGNIFICANT CHANGE UP (ref 0.5–1.3)
DIFF PNL FLD: ABNORMAL
EGFR: 96 ML/MIN/1.73M2 — SIGNIFICANT CHANGE UP
EOSINOPHIL # BLD AUTO: 0 K/UL — SIGNIFICANT CHANGE UP (ref 0–0.5)
EOSINOPHIL NFR BLD AUTO: 0 % — SIGNIFICANT CHANGE UP (ref 0–6)
GLUCOSE SERPL-MCNC: 147 MG/DL — HIGH (ref 70–99)
GLUCOSE UR QL: NEGATIVE MG/DL — SIGNIFICANT CHANGE UP
HCT VFR BLD CALC: 38.3 % — LOW (ref 39–50)
HGB BLD-MCNC: 13.3 G/DL — SIGNIFICANT CHANGE UP (ref 13–17)
INR BLD: 1.02 RATIO — SIGNIFICANT CHANGE UP (ref 0.85–1.16)
KETONES UR-MCNC: NEGATIVE MG/DL — SIGNIFICANT CHANGE UP
LEUKOCYTE ESTERASE UR-ACNC: ABNORMAL
LYMPHOCYTES # BLD AUTO: 1.71 K/UL — SIGNIFICANT CHANGE UP (ref 1–3.3)
LYMPHOCYTES # BLD AUTO: 27 % — SIGNIFICANT CHANGE UP (ref 13–44)
MCHC RBC-ENTMCNC: 34.7 GM/DL — SIGNIFICANT CHANGE UP (ref 32–36)
MCHC RBC-ENTMCNC: 37.6 PG — HIGH (ref 27–34)
MCV RBC AUTO: 108.2 FL — HIGH (ref 80–100)
MONOCYTES # BLD AUTO: 0.51 K/UL — SIGNIFICANT CHANGE UP (ref 0–0.9)
MONOCYTES NFR BLD AUTO: 8 % — SIGNIFICANT CHANGE UP (ref 2–14)
NEUTROPHILS # BLD AUTO: 4.04 K/UL — SIGNIFICANT CHANGE UP (ref 1.8–7.4)
NEUTROPHILS NFR BLD AUTO: 64 % — SIGNIFICANT CHANGE UP (ref 43–77)
NITRITE UR-MCNC: NEGATIVE — SIGNIFICANT CHANGE UP
NRBC # BLD: SIGNIFICANT CHANGE UP /100 WBCS (ref 0–0)
PH UR: 6 — SIGNIFICANT CHANGE UP (ref 5–8)
PLATELET # BLD AUTO: 373 K/UL — SIGNIFICANT CHANGE UP (ref 150–400)
POTASSIUM SERPL-MCNC: 4.3 MMOL/L — SIGNIFICANT CHANGE UP (ref 3.5–5.3)
POTASSIUM SERPL-SCNC: 4.3 MMOL/L — SIGNIFICANT CHANGE UP (ref 3.5–5.3)
PROT SERPL-MCNC: 6.5 G/DL — SIGNIFICANT CHANGE UP (ref 6–8.3)
PROT UR-MCNC: SIGNIFICANT CHANGE UP MG/DL
PROTHROM AB SERPL-ACNC: 12 SEC — SIGNIFICANT CHANGE UP (ref 9.9–13.4)
RBC # BLD: 3.54 M/UL — LOW (ref 4.2–5.8)
RBC # FLD: 13.6 % — SIGNIFICANT CHANGE UP (ref 10.3–14.5)
SODIUM SERPL-SCNC: 139 MMOL/L — SIGNIFICANT CHANGE UP (ref 135–145)
SP GR SPEC: 1.01 — SIGNIFICANT CHANGE UP (ref 1–1.03)
UROBILINOGEN FLD QL: 0.2 MG/DL — SIGNIFICANT CHANGE UP (ref 0.2–1)
WBC # BLD: 6.32 K/UL — SIGNIFICANT CHANGE UP (ref 3.8–10.5)
WBC # FLD AUTO: 6.32 K/UL — SIGNIFICANT CHANGE UP (ref 3.8–10.5)

## 2024-10-14 PROCEDURE — 71045 X-RAY EXAM CHEST 1 VIEW: CPT | Mod: 26

## 2024-10-14 PROCEDURE — 87077 CULTURE AEROBIC IDENTIFY: CPT

## 2024-10-14 PROCEDURE — 87186 SC STD MICRODIL/AGAR DIL: CPT

## 2024-10-14 PROCEDURE — 81001 URINALYSIS AUTO W/SCOPE: CPT

## 2024-10-14 PROCEDURE — 85610 PROTHROMBIN TIME: CPT

## 2024-10-14 PROCEDURE — 36415 COLL VENOUS BLD VENIPUNCTURE: CPT

## 2024-10-14 PROCEDURE — 99284 EMERGENCY DEPT VISIT MOD MDM: CPT | Mod: FS

## 2024-10-14 PROCEDURE — 71045 X-RAY EXAM CHEST 1 VIEW: CPT

## 2024-10-14 PROCEDURE — 93005 ELECTROCARDIOGRAM TRACING: CPT

## 2024-10-14 PROCEDURE — 93010 ELECTROCARDIOGRAM REPORT: CPT

## 2024-10-14 PROCEDURE — 87086 URINE CULTURE/COLONY COUNT: CPT

## 2024-10-14 PROCEDURE — 80053 COMPREHEN METABOLIC PANEL: CPT

## 2024-10-14 PROCEDURE — 85730 THROMBOPLASTIN TIME PARTIAL: CPT

## 2024-10-14 PROCEDURE — 85025 COMPLETE CBC W/AUTO DIFF WBC: CPT

## 2024-10-14 PROCEDURE — 99285 EMERGENCY DEPT VISIT HI MDM: CPT

## 2024-10-14 RX ORDER — SODIUM CHLORIDE 0.9 % (FLUSH) 0.9 %
500 SYRINGE (ML) INJECTION ONCE
Refills: 0 | Status: COMPLETED | OUTPATIENT
Start: 2024-10-14 | End: 2024-10-14

## 2024-10-14 RX ORDER — ENOXAPARIN SODIUM 150 MG/ML
40 INJECTION SUBCUTANEOUS EVERY 24 HOURS
Refills: 0 | Status: DISCONTINUED | OUTPATIENT
Start: 2024-10-14 | End: 2024-10-14

## 2024-10-14 RX ORDER — ESCITALOPRAM OXALATE 10 MG
10 TABLET ORAL DAILY
Refills: 0 | Status: DISCONTINUED | OUTPATIENT
Start: 2024-10-14 | End: 2024-10-15

## 2024-10-14 RX ORDER — ACETAMINOPHEN 325 MG
650 TABLET ORAL EVERY 6 HOURS
Refills: 0 | Status: DISCONTINUED | OUTPATIENT
Start: 2024-10-14 | End: 2024-10-15

## 2024-10-14 RX ORDER — ATORVASTATIN CALCIUM 10 MG/1
20 TABLET, FILM COATED ORAL AT BEDTIME
Refills: 0 | Status: DISCONTINUED | OUTPATIENT
Start: 2024-10-14 | End: 2024-10-15

## 2024-10-14 RX ORDER — CEFTRIAXONE SODIUM 1 G
1000 VIAL (EA) INJECTION EVERY 24 HOURS
Refills: 0 | Status: DISCONTINUED | OUTPATIENT
Start: 2024-10-14 | End: 2024-10-15

## 2024-10-14 RX ORDER — AMLODIPINE BESYLATE 5 MG
2.5 TABLET ORAL AT BEDTIME
Refills: 0 | Status: DISCONTINUED | OUTPATIENT
Start: 2024-10-14 | End: 2024-10-15

## 2024-10-14 RX ADMIN — Medication 1 TABLET(S): at 21:29

## 2024-10-14 NOTE — ED ADULT NURSE NOTE - NSFALLASSESSNEED_ED_ALL_ED
[address] [facility]  EMERGENCY DEPARTMENT HISTORY AND PHYSICAL EXAM       Date of Service: 10/8/2017   Patient Name: Chris Rodrigues. YOB: 1979  Medical Record Number: [id number]    History of Presenting Illness     Chief Complaint   Patient presents with    Abdominal Pain     to entire abdomen starting today, reports 4 episodes of emesis, hx of cyclic vomiting, denies fever, urinary issue, diarrhea or constipation        History Provided By:  patient and spouse    Additional History:     Chris Cam is a 45 y.o. male, pmhx GERD, gastroparesis, superior mesenteric artery, and malnutrition, who presents ambulatory to the ED c/o exacerbation of his chronic, diffuse abdominal pain x this morning. He describes the pain as a stabbing, cramping. The pt notes associated nausea, vomiting and shortness of breath secondary to pain. Of note, the pt has been seen in the ED several times for the same symptoms, the most recent visit was on 10/5/2017. Past records show that Dr. Chata Adair has sent pt to a different GI physician at [facility] for tests and he has had multiple CT scans done. Pt reports he lost weight rapidly since his abdominal issues began, along with a decrease in appetite. Of note, pt has a J tube in his Left lower quadrant. Pt states previous visits here he has received fluids, dilaudid, Zofran, and sometimes Pepcid. Pt specifically denies any fever, congestion, chest pain, diarrhea, dysuria, or urinary frequency. PCP: Brenda Donato MD    PMHx: Significant for GERD, gastroparesis, superior mesenteric artery, and malnutrition  PSHx: Significant for cholecystectomy, J tube  Social Hx: +tobacco (.25 PPD), -EtOH, +Illicit Drugs (Marijuana)    There are no other complaints, changes, or physical findings at this time.      Primary Care Provider: Brenda Donato MD     Specialist:  Antonio Álvarez MD    Past History     Past Medical History:   Past Medical History: Diagnosis Date    Asthma     denies, but uses inhaler for wheezing    Congenital pectus excavatum 1/12/2015    Diarrhea 1/12/2015    Esophageal motor disorder 1/7/2016    egj outflow obstruction  (eus suggests Achalasia).  Gastrointestinal disorder     Gastroparesis     GERD (gastroesophageal reflux disease)     Other ill-defined conditions(799.89) 2000    pneumothorax    Psychiatric disorder     anxiety & depression    Superior mesenteric artery (trunk) injury 1/30/2016    ? See bas, ugi report and images 1/29/2016    Vomiting 1/12/2015        Past Surgical History:   Past Surgical History:   Procedure Laterality Date    HX CHOLECYSTECTOMY  2/5/15    lap eliseo    HX OTHER SURGICAL  02/2016    chest tube for collapsed lung    HX OTHER SURGICAL  03/16/2017    Lap jenjeunostomy placemnet - Joan Marroquin MD    OR ESOPHAGOGASTRODUODENOSCOPY SUBMUCOSAL INJECTION  1/7/2016             Family History:   Family History   Problem Relation Age of Onset    Hypertension Mother     Hypertension Father     Diabetes Father         Social History:   Social History   Substance Use Topics    Smoking status: Current Every Day Smoker     Packs/day: 0.25     Years: 15.00    Smokeless tobacco: Former User      Comment: stopped electronic cigareette useage jan 1 2016     Alcohol use No      Comment: none        Allergies:   No Known Allergies      Review of Systems   Review of Systems   Constitutional: Positive for appetite change and unexpected weight change. Negative for chills and fever. HENT: Negative. Eyes: Negative. Respiratory: Positive for shortness of breath. Negative for cough and chest tightness. Cardiovascular: Negative for chest pain and leg swelling. Gastrointestinal: Positive for abdominal pain, nausea and vomiting. Negative for diarrhea. Endocrine: Negative. Genitourinary: Negative for difficulty urinating and dysuria. Musculoskeletal: Negative for myalgias. Skin: Negative. Neurological: Negative. Psychiatric/Behavioral: Negative. All other systems reviewed and are negative. Physical Exam  Physical Exam   Constitutional: He is oriented to person, place, and time. He appears well-developed and well-nourished. He appears distressed. HENT:   Head: Normocephalic and atraumatic. Nose: Nose normal.   Mouth/Throat: No oropharyngeal exudate. Eyes: Conjunctivae and EOM are normal. Pupils are equal, round, and reactive to light. Neck: Normal range of motion. Neck supple. No JVD present. Cardiovascular: Normal rate, regular rhythm, normal heart sounds and intact distal pulses. Exam reveals no friction rub. No murmur heard. Pulmonary/Chest: Effort normal and breath sounds normal. No stridor. No respiratory distress. He has no wheezes. He has no rales. Abdominal: Soft. Bowel sounds are normal. He exhibits no distension. There is no tenderness. There is no rebound. j tube in place   Musculoskeletal: Normal range of motion. He exhibits no tenderness. Neurological: He is alert and oriented to person, place, and time. No cranial nerve deficit. Skin: Skin is warm and dry. No rash noted. He is not diaphoretic. Psychiatric: His speech is normal and behavior is normal. Judgment and thought content normal. His affect is angry. Cognition and memory are normal.   Nursing note and vitals reviewed. Medical Decision Making     I am the first provider for this patient. I reviewed the vital signs, available nursing notes, past medical history, past surgical history, family history and social history. Old Medical Records: Old medical records. Nursing notes. Provider Notes:   DDX:  Gastroparesis, chronic abdominal pain, hyperemesis, cyclic vomiting, THC abuse    Plan:  Labs, ua, uds, analgesic, ivf    Impression:  Dehydration, chronic/cyclic vomiting, chronic abdominal pain    ED Course:  2:06 AM   Initial assessment performed.  The patients presenting problems have been discussed, and they are in agreement with the care plan formulated and outlined with them. I have encouraged them to ask questions as they arise throughout their visit. Progress Notes:     I reviewed our electronic medical record system for any past medical records that were available that may contribute to the patients current condition, the nursing notes and and vital signs from today's visit    Nursing notes will be reviewed as they become available in realtime while the pt has been in the ED. Hailee Wilkerson MD    I have spent 3-7 minutes discussing the medical risks of prolonged smoking habits and advised the patient of the benefits of the cessation of smoking, providing specific suggestions on how to quit. Hailee Wilkerson MD    10:47PM  Pt's rate is 80 with a normal sinus rhythm as noted on Cardiac monitor. SpO2 is 100%, on RA      12:20 AM  Progress note:  Pt noted to be feeling better, ready for discharge. Discussed lab and imaging findings with pt and/or family, specifically noting no change in lab results from recent visits. Still Positive for THC. Pt educated on narcotic used and chronic pain. Urged pt to work with pain management MD as disccussed. Pt will follow up as instructed. All questions have been answered, pt voiced understanding and agreement with plan. If narcotics were prescribed, pt was advised not to drive or operate heavy machinery. If abx were prescribed, pt advised that diarrhea and rash are possible side effects of the medications. Specific return precautions provided in addition to instructions for pt to return to the ED immediately should sx worsen at any time.   Hailee Wilkerson MD    Diagnostic Study Results     Labs       Recent Results (from the past 12 hour(s))   CBC WITH AUTOMATED DIFF    Collection Time: 10/08/17  8:17 PM   Result Value Ref Range    WBC 8.9 4.1 - 11.1 K/uL    RBC 3.51 (L) 4.10 - 5.70 M/uL    HGB 10.9 (L) 12.1 - 17.0 g/dL    HCT 31.5 (L) 36.6 - 50.3 %    MCV 89.7 80.0 - 99.0 FL    MCH 31.1 26.0 - 34.0 PG    MCHC 34.6 30.0 - 36.5 g/dL    RDW 13.5 11.5 - 14.5 %    PLATELET 139 401 - 800 K/uL    NEUTROPHILS 89 (H) 32 - 75 %    LYMPHOCYTES 9 (L) 12 - 49 %    MONOCYTES 2 (L) 5 - 13 %    EOSINOPHILS 0 0 - 7 %    BASOPHILS 0 0 - 1 %    ABS. NEUTROPHILS 7.9 1.8 - 8.0 K/UL    ABS. LYMPHOCYTES 0.8 0.8 - 3.5 K/UL    ABS. MONOCYTES 0.2 0.0 - 1.0 K/UL    ABS. EOSINOPHILS 0.0 0.0 - 0.4 K/UL    ABS. BASOPHILS 0.0 0.0 - 0.1 K/UL   METABOLIC PANEL, COMPREHENSIVE    Collection Time: 10/08/17  8:17 PM   Result Value Ref Range    Sodium 135 (L) 136 - 145 mmol/L    Potassium 4.0 3.5 - 5.1 mmol/L    Chloride 104 97 - 108 mmol/L    CO2 23 21 - 32 mmol/L    Anion gap 8 5 - 15 mmol/L    Glucose 99 65 - 100 mg/dL    BUN 4 (L) 6 - 20 MG/DL    Creatinine 0.80 0.70 - 1.30 MG/DL    BUN/Creatinine ratio 5 (L) 12 - 20      GFR est AA >60 >60 ml/min/1.73m2    GFR est non-AA >60 >60 ml/min/1.73m2    Calcium 8.8 8.5 - 10.1 MG/DL    Bilirubin, total 0.6 0.2 - 1.0 MG/DL    ALT (SGPT) 18 12 - 78 U/L    AST (SGOT) 14 (L) 15 - 37 U/L    Alk.  phosphatase 68 45 - 117 U/L    Protein, total 8.0 6.4 - 8.2 g/dL    Albumin 4.3 3.5 - 5.0 g/dL    Globulin 3.7 2.0 - 4.0 g/dL    A-G Ratio 1.2 1.1 - 2.2     LIPASE    Collection Time: 10/08/17  8:17 PM   Result Value Ref Range    Lipase 100 73 - 393 U/L   LACTIC ACID    Collection Time: 10/08/17 10:11 PM   Result Value Ref Range    Lactic acid 1.5 0.4 - 2.0 MMOL/L   URINALYSIS W/ REFLEX CULTURE    Collection Time: 10/08/17 11:01 PM   Result Value Ref Range    Color YELLOW/STRAW      Appearance CLEAR CLEAR      Specific gravity 1.012 1.003 - 1.030      pH (UA) 7.0 5.0 - 8.0      Protein 30 (A) NEG mg/dL    Glucose NEGATIVE  NEG mg/dL    Ketone TRACE (A) NEG mg/dL    Bilirubin NEGATIVE  NEG      Blood NEGATIVE  NEG      Urobilinogen 1.0 0.2 - 1.0 EU/dL    Nitrites NEGATIVE  NEG      Leukocyte Esterase NEGATIVE  NEG      WBC 0-4 0 - 4 /hpf RBC 5-10 0 - 5 /hpf    Epithelial cells FEW FEW /lpf    Bacteria NEGATIVE  NEG /hpf    UA:UC IF INDICATED CULTURE NOT INDICATED BY UA RESULT CNI      Mucus TRACE (A) NEG /lpf    Hyaline cast 0-2 0 - 5 /lpf   DRUG SCREEN, URINE    Collection Time: 10/08/17 11:01 PM   Result Value Ref Range    AMPHETAMINES NEGATIVE  NEG      BARBITURATES NEGATIVE  NEG      BENZODIAZEPINES NEGATIVE  NEG      COCAINE NEGATIVE  NEG      METHADONE NEGATIVE  NEG      OPIATES NEGATIVE  NEG      PCP(PHENCYCLIDINE) NEGATIVE  NEG      THC (TH-CANNABINOL) POSITIVE (A) NEG      Drug screen comment (NOTE)        Vital Signs-Reviewed the patient's vital signs. Patient Vitals for the past 12 hrs:   Temp Pulse Resp BP SpO2   10/09/17 0030 - (!) 56 13 96/59 100 %   10/08/17 2330 - (!) 58 18 99/60 99 %   10/08/17 2305 - 66 15 100/61 98 %   10/08/17 2200 - - - 121/76 98 %   10/08/17 2147 - - - 102/75 100 %   10/08/17 2011 99.4 °F (37.4 °C) 73 20 (!) 114/92 100 %       Medications Given in the ED:  Medications   diphenhydrAMINE (BENADRYL) injection 25 mg (25 mg IntraVENous Refused 10/8/17 2236)   haloperidol lactate (HALDOL) injection 10 mg (10 mg IntraVENous Given 10/8/17 2240)   sodium chloride 0.9 % bolus infusion 1,000 mL (0 mL IntraVENous IV Completed 10/8/17 2339)   fentaNYL citrate (PF) injection 100 mcg (100 mcg IntraVENous Given 10/8/17 2256)   sodium chloride 0.9 % bolus infusion 1,000 mL (0 mL IntraVENous IV Completed 10/9/17 0040)   fentaNYL citrate (PF) injection 100 mcg (100 mcg IntraVENous Given 10/8/17 2341)         Diagnosis     Clinical Impression:   1. Recurrent generalized abdominal pain    2.  Gastroparesis         Plan:  1:   Follow-up Information     Follow up With Details Comments Contact Info    Celi Hdz MD Schedule an appointment as soon as possible for a visit in 2 days  [address] [facility], [address]  [facility] Schedule an appointment as soon as possible for a visit today  [address]  [address] [facility] [address]        2:   Discharge Medication List as of 10/9/2017 12:15 AM      START taking these medications    Details   diazePAM (VALIUM) 5 mg tablet Take 1 Tab by mouth every eight (8) hours as needed (spasm). Max Daily Amount: 15 mg., Print, Disp-15 Tab, R-0      ketorolac (TORADOL) 10 mg tablet Take 1 Tab by mouth every six (6) hours as needed for Pain., Normal, Disp-10 Tab, R-0      !! metoclopramide HCl (REGLAN) 10 mg tablet Take 1 Tab by mouth every six (6) hours as needed., Normal, Disp-12 Tab, R-0       !! - Potential duplicate medications found. Please discuss with provider. CONTINUE these medications which have NOT CHANGED    Details   !! metoclopramide HCl (REGLAN) 10 mg tablet Take 1 Tab by mouth three (3) times daily for 10 days. , Print, Disp-30 Tab, R-0      ondansetron (ZOFRAN ODT) 4 mg disintegrating tablet Take 1 Tab by mouth every eight (8) hours as needed for Nausea., Normal, Disp-10 Tab, R-0      tretinoin (RETIN-A) 0.025 % topical cream APPLY CREAM AT BEDTIME, Historical Med, R-5      amitriptyline (ELAVIL) 10 mg tablet TAKE 1 TABLET BY MOUTH AT BEDTIME DAILY, Normal, Disp-90 Tab, R-3      mirtazapine (REMERON) 45 mg tablet Take 1 Tab by mouth nightly., Normal, Disp-90 Tab, R-3      cholecalciferol (VITAMIN D3) 1,000 unit tablet Take  by mouth daily. , Historical Med      food supplemt, lactose-reduced (ENSURE ORIGINAL) liqd Take 237 mL by mouth three (3) times daily. , Normal, Disp-90 Can, R-3      dicyclomine (BENTYL) 20 mg tablet Take one tablet three to four times a day as needed for abdominal cramping., Normal, Disp-360 Tab, R-3      albuterol (VENTOLIN HFA) 90 mcg/actuation inhaler Take 2 Puffs by inhalation every four (4) hours as needed for Wheezing., Normal, Disp-1 Inhaler, R-0      famotidine (PEPCID) 20 mg tablet Take 1 Tab by mouth two (2) times a day., Normal, Disp-180 Tab, R-3      polyethylene glycol (MIRALAX) 17 gram/dose powder Take 17 g by mouth daily. 1 tablespoon with 8 oz of water daily, Normal, Disp-595 g, R-0      oxyCODONE IR (ROXICODONE) 5 mg immediate release tablet Take 1 Tab by mouth every four (4) hours as needed for Pain. Max Daily Amount: 30 mg., Print, Disp-6 Tab, R-0       !! - Potential duplicate medications found. Please discuss with provider. Return to ED if Worse    Disposition Note:    Discharge Note:  12:22 AM  The pt is ready for discharge. The pt's signs, symptoms, diagnosis, and discharge instructions have been discussed and pt has conveyed their understanding. The pt is to follow up as recommended or return to ER should their symptoms worsen. Plan has been discussed and pt is in agreement. This note is prepared by Hallie Miller, acting as Scribe for MD Leonardo Richardson MD : The scribe's documentation has been prepared under my direction and personally reviewed by me in its entirety. I confirm that the note above accurately reflects all work, treatment, procedures, and medical decision making performed by me. This note will not be viewable in [address] yes

## 2024-10-14 NOTE — ED PROVIDER NOTE - TEMPLATE, MLM
General Finasteride Male Counseling: Finasteride Counseling:  I discussed with the patient the risks of use of finasteride including but not limited to decreased libido, decreased ejaculate volume, gynecomastia, and depression. Women should not handle medication.  All of the patient's questions and concerns were addressed. Finasteride Counseling:  I discussed with the patient the risks of use of finasteride including but not limited to decreased libido, decreased ejaculate volume, gynecomastia, and depression. Women should not handle medication.  All of the patient's questions and concerns were addressed.

## 2024-10-14 NOTE — ED PROVIDER NOTE - CARE PROVIDER_API CALL
Willis Londono  Internal Medicine  [address], [address]  [place] [address]  Phone: [phone]  Fax: [phone]  Follow Up Time:

## 2024-10-14 NOTE — ED PROVIDER NOTE - WHICH SHOWED
ECG per my independent interpretation shows sinus bradycardia with a rate of 48, normal axis, no clear ST segment elevation consistent with ischemia.

## 2024-10-14 NOTE — ED PROVIDER NOTE - OBJECTIVE STATEMENT
89-year-old male history of dementia brought in from home accompanied by wife due to increased sleepiness today.  States today she woke him up and he had breakfast but otherwise has seemed sleepier than usual.  Symptoms seem to be waxing and waning even while in the ED.

## 2024-10-14 NOTE — ED ADULT NURSE REASSESSMENT NOTE - NS ED NURSE REASSESS COMMENT FT1
Pt difficult IV stick. This RN attempted twice without success. Able to obtain blood work. Pt wife refusing additional IV sticks now. Pt and wife educated on reason for IV and continue to refuse IV placement now. MD Carlos aware.

## 2024-10-14 NOTE — ED CLERICAL - NS ED CLERK NOTE PRE-ARRIVAL INFORMATION; ADDITIONAL PRE-ARRIVAL INFORMATION

## 2024-10-14 NOTE — ED ADULT NURSE NOTE - NSFALLHARMRISKINTERV_ED_ALL_ED
Assistance OOB with selected safe patient handling equipment if applicable/Assistance with ambulation/Communicate risk of Fall with Harm to all staff, patient, and family/Monitor gait and stability/Monitor for mental status changes and reorient to person, place, and time, as needed/Provide visual cue: red socks, yellow wristband, yellow gown, etc/Reinforce activity limits and safety measures with patient and family/Toileting schedule using arm’s reach rule for commode and bathroom/Use of alarms - bed, stretcher, chair and/or video monitoring/Bed in lowest position, wheels locked, appropriate side rails in place/Call bell, personal items and telephone in reach/Instruct patient to call for assistance before getting out of bed/chair/stretcher/Non-slip footwear applied when patient is off stretcher/[place] to call system/Physically safe environment - no spills, clutter or unnecessary equipment/Purposeful Proactive Rounding/Room/bathroom lighting operational, light cord in reach

## 2024-10-14 NOTE — ED ADULT NURSE NOTE - PAIN RATING/NUMBER SCALE (0-10): ACTIVITY
Writer reviewed discharge instructions.  patient verbalized understanding.  Patient stable, ambulatory with steady gait, GCS 15, no signs/ symptoms of acute distress, respirations unlabored, and with family. Patient discharged with documented belongings.   
0 (no pain/absence of nonverbal indicators of pain)

## 2024-10-14 NOTE — ED PROVIDER NOTE - CLINICAL SUMMARY MEDICAL DECISION MAKING FREE TEXT BOX
I, Emil Carlos MD, have seen and examined the patient on the date of service.  I agree with the BRIGID's assessment as written, with exceptions or additions as noted below or in a separate note.  Patient with a history of dementia is presenting with concern for increased sleepiness compared to his baseline.  History obtained from wife.  States that he does not always sleep through the night and today when they tried to wake him up, he was sleepier compared to usual.  However this is waxing and waning and currently in the ER he seems to be back to his baseline.  On exam here his vital signs are stable, he appears contracted which the wife states is baseline for him.  He is afebrile.  He moans at times with movement.  He is bradycardic with no focal breath sounds.  He does not have any clear wounds seen on exam.  Patient's symptoms possibly consistent with underlying advanced age and dementia and not sleeping well the previous night.  However I had prolonged discussion with patient's wife at bedside, interested in workup.  Will assess for CHARBEL, hypernatremia, pneumonia, UTI.  No reported falls.  Plan on lab work, imaging, reassessment. Opzelura Counseling:  I discussed with the patient the risks of Opzelura including but not limited to nasopharngitis, bronchitis, ear infection, eosinophila, hives, diarrhea, folliculitis, tonsillitis, and rhinorrhea.  Taken orally, this medication has been linked to serious infections; higher rate of mortality; malignancy and lymphoproliferative disorders; major adverse cardiovascular events; thrombosis; thrombocytopenia, anemia, and neutropenia; and lipid elevations.

## 2024-10-14 NOTE — ED PROVIDER NOTE - PROGRESS NOTE DETAILS
Planned to dc pt home on PO abx. After length discussion, wife was not agreeable to this plan for multiple reasons. Pt was admitted to hospital. Now wife adamantly requesting pt be discharged home. patient found to have findings concerning for UTI.  Initial plan was to discharge patient home on oral antibiotics.  family was not comfortable taking him home.  Because of that, plan to do an admit patient for antibiotics and observation.  After patient was admitted, family then changed her mind and would prefer to take patient home.  Given patient's chronic medical conditions and stable vital signs and lab work other than urinalysis, I am comfortable with discharge at this time.  Plan to set up medical transport.  Emil Carlos MD.

## 2024-10-14 NOTE — ED ADULT NURSE NOTE - OBJECTIVE STATEMENT
Per pt wife brought in due to being more lethargic than normal. Pt is retracted in all 4 extremities. Noted to be confused, per wife this is baseline confusion. Pt minimally speaking now. Pt noted to be lethargic. Bed bound per wife.

## 2024-10-14 NOTE — ED ADULT NURSE REASSESSMENT NOTE - NS ED NURSE REASSESS COMMENT FT1
Pt remains at baseline mental status now. Slow to speak, lethargic, confused. Wife remains at baseline. Wife refusing discharge now. Wife agrees to admission. Wife refusing IV. Wife educated in detail about reason for IV, continues to refuse at this time. MD aware. Pt tolerated PO med well when crushed and in applesauce.

## 2024-10-14 NOTE — ED PROVIDER NOTE - NSFOLLOWUPINSTRUCTIONS_ED_ALL_ED_FT
A urinary tract infection (UTI) is an infection in your urinary tract. The urinary tract is made up of the organs that make, store, and get rid of pee (urine) in your body. These organs include:  The kidneys.  The ureters.  The bladder.  The urethra.  What are the causes?  Most UTIs are caused by germs called bacteria. They may be in or near your genitals. These germs grow and cause swelling in your urinary tract.    What increases the risk?  You're more likely to get a UTI if:  You have a soft tube called a catheter that drains your pee.  You can't control when you pee or poop.  You have trouble peeing because of:  A prostate that's bigger than normal.  A kidney stone.  A urinary blockage.  A nerve condition that affects your bladder.  Not getting enough to drink.  You're sexually active.  You're an older adult.  You're also more likely to get a UTI if you have other health problems, such as:  Diabetes.  A weak immune system. Your immune system is your body's defense system.  Sickle cell disease.  Injury of the spine.  What are the signs or symptoms?  Symptoms may include:  Needing to pee right away.  Peeing small amounts often.  Trouble getting the stream started.  Pain or burning when you pee.  Blood in your pee.  Pee that smells bad or odd.  Pain in your belly or lower back.  You may also:  Feel confused. This may be the first symptom in older adults.  Vomit.  Not feel hungry.  Feel tired or easily annoyed.  Have a fever or chills.  How is this diagnosed?  A UTI is diagnosed based on your medical history and an exam. You may also have other tests. These may include:  Pee tests.  Blood tests.  Tests for sexually transmitted infections (STIs).  If you've had more than one UTI, you may need to have imaging studies done to find out why you keep getting them.    How is this treated?  A UTI can be treated by:  Taking antibiotics or other medicines.  Drinking enough fluid to keep your pee pale yellow.  In rare cases, a UTI can cause a very bad condition called sepsis. Sepsis may be treated in the hospital.    Follow these instructions at home:  Medicines    Take your medicines only as told by your health care provider.  If you were given antibiotics, take them as told by your provider. Do not stop taking them even if you start to feel better.  General instructions    Make sure you:  Pee often and fully. Do not hold your pee for a long time.  Pee after you have sex.  Contact a health care provider if:  Your symptoms don't get better after 1–2 days of taking antibiotics.  Your symptoms go away and then come back.  You have a fever or chills.  You vomit or feel like you may vomit.  Get help right away if:  You have very bad pain in your back or lower belly.  You faint.  This information is not intended to replace advice given to you by your health care provider. Make sure you discuss any questions you have with your health care provider.

## 2024-10-15 ENCOUNTER — TRANSCRIPTION ENCOUNTER (OUTPATIENT)
Age: 89
End: 2024-10-15

## 2024-10-15 VITALS
OXYGEN SATURATION: 96 % | RESPIRATION RATE: 15 BRPM | HEART RATE: 60 BPM | SYSTOLIC BLOOD PRESSURE: 117 MMHG | DIASTOLIC BLOOD PRESSURE: 54 MMHG | TEMPERATURE: 99 F

## 2024-10-15 NOTE — DISCHARGE NOTE NURSING/CASE MANAGEMENT/SOCIAL WORK - NSDCPEFALRISK_GEN_ALL_CORE
For information on Fall & Injury Prevention, visit: [email or web address] OR  [email or web address] OR  https://www.cdc.gov/steadi/patient.html

## 2024-10-15 NOTE — DISCHARGE NOTE NURSING/CASE MANAGEMENT/SOCIAL WORK - PATIENT PORTAL LINK FT
You can access the FollowMyHealth Patient Portal offered by [facility] by registering at the following website: [email or web address]. By joining [employer]’s FollowMyHealth portal, you will also be able to view your health information using other applications (apps) compatible with our system.

## 2024-10-16 ENCOUNTER — APPOINTMENT (OUTPATIENT)
Dept: HOME HEALTH SERVICES | Facility: HOME HEALTH | Age: 89
End: 2024-10-16

## 2024-10-17 ENCOUNTER — NON-APPOINTMENT (OUTPATIENT)
Age: 89
End: 2024-10-17

## 2024-10-22 ENCOUNTER — APPOINTMENT (OUTPATIENT)
Dept: HOME HEALTH SERVICES | Facility: HOME HEALTH | Age: 89
End: 2024-10-22

## 2024-10-22 VITALS
OXYGEN SATURATION: 95 % | RESPIRATION RATE: 18 BRPM | SYSTOLIC BLOOD PRESSURE: 104 MMHG | DIASTOLIC BLOOD PRESSURE: 53 MMHG | HEART RATE: 61 BPM | TEMPERATURE: 98.1 F

## 2024-10-22 PROCEDURE — 99495 TRANSJ CARE MGMT MOD F2F 14D: CPT

## 2024-10-29 ENCOUNTER — LABORATORY RESULT (OUTPATIENT)
Age: 89
End: 2024-10-29

## 2024-10-29 RX ORDER — DOXYCYCLINE HYCLATE 100 MG
1 CAPSULE ORAL
Qty: 14 | Refills: 0
Start: 2024-10-29 | End: 2024-11-04

## 2024-10-30 ENCOUNTER — NON-APPOINTMENT (OUTPATIENT)
Age: 89
End: 2024-10-30

## 2024-11-13 ENCOUNTER — APPOINTMENT (OUTPATIENT)
Dept: HOME HEALTH SERVICES | Facility: HOME HEALTH | Age: 89
End: 2024-11-13

## 2024-11-13 DIAGNOSIS — Z23 ENCOUNTER FOR IMMUNIZATION: ICD-10-CM

## 2024-12-16 ENCOUNTER — APPOINTMENT (OUTPATIENT)
Dept: HOME HEALTH SERVICES | Facility: HOME HEALTH | Age: 88
End: 2024-12-16

## 2024-12-16 VITALS
DIASTOLIC BLOOD PRESSURE: 80 MMHG | TEMPERATURE: 98 F | HEART RATE: 66 BPM | SYSTOLIC BLOOD PRESSURE: 150 MMHG | OXYGEN SATURATION: 96 %

## 2024-12-30 ENCOUNTER — NON-APPOINTMENT (OUTPATIENT)
Age: 88
End: 2024-12-30

## 2024-12-31 ENCOUNTER — NON-APPOINTMENT (OUTPATIENT)
Age: 88
End: 2024-12-31

## 2025-01-07 ENCOUNTER — APPOINTMENT (OUTPATIENT)
Dept: HOME HEALTH SERVICES | Facility: HOME HEALTH | Age: 89
End: 2025-01-07
Payer: MEDICARE

## 2025-01-07 VITALS
OXYGEN SATURATION: 96 % | SYSTOLIC BLOOD PRESSURE: 138 MMHG | DIASTOLIC BLOOD PRESSURE: 79 MMHG | BODY MASS INDEX: 15.64 KG/M2 | HEART RATE: 58 BPM | RESPIRATION RATE: 15 BRPM | WEIGHT: 109 LBS

## 2025-01-07 DIAGNOSIS — R53.2 FUNCTIONAL QUADRIPLEGIA: ICD-10-CM

## 2025-01-07 DIAGNOSIS — I77.9 DISORDER OF ARTERIES AND ARTERIOLES, UNSPECIFIED: ICD-10-CM

## 2025-01-07 DIAGNOSIS — Z71.89 OTHER SPECIFIED COUNSELING: ICD-10-CM

## 2025-01-07 DIAGNOSIS — E55.9 VITAMIN D DEFICIENCY, UNSPECIFIED: ICD-10-CM

## 2025-01-07 DIAGNOSIS — D64.9 ANEMIA, UNSPECIFIED: ICD-10-CM

## 2025-01-07 DIAGNOSIS — E46 UNSPECIFIED PROTEIN-CALORIE MALNUTRITION: ICD-10-CM

## 2025-01-07 DIAGNOSIS — M24.561 CONTRACTURE, RIGHT KNEE: ICD-10-CM

## 2025-01-07 DIAGNOSIS — E11.9 TYPE 2 DIABETES MELLITUS W/OUT COMPLICATIONS: ICD-10-CM

## 2025-01-07 DIAGNOSIS — F03.93 UNSPECIFIED DEMENTIA, UNSPECIFIED SEVERITY, WITH MOOD DISTURBANCE: ICD-10-CM

## 2025-01-07 PROCEDURE — 99349 HOME/RES VST EST MOD MDM 40: CPT

## 2025-01-07 RX ORDER — CHLORHEXIDINE GLUCONATE 4 %
325 (65 FE) LIQUID (ML) TOPICAL
Refills: 0 | Status: ACTIVE | COMMUNITY
Start: 2025-01-07

## 2025-01-07 RX ORDER — MULTIVIT-MIN/IRON/FOLIC ACID/K 18-600-40
50 MCG CAPSULE ORAL
Qty: 90 | Refills: 0 | Status: ACTIVE | COMMUNITY
Start: 2025-01-07

## 2025-01-07 RX ORDER — MV-MIN/FOLIC/K1/LYCOPEN/LUTEIN 200-15 MCG
TABLET ORAL DAILY
Refills: 0 | Status: ACTIVE | COMMUNITY
Start: 2025-01-07

## 2025-01-07 RX ORDER — COLD-HOT PACK
50 EACH MISCELLANEOUS DAILY
Refills: 0 | Status: ACTIVE | COMMUNITY
Start: 2025-01-07

## 2025-01-08 PROBLEM — M24.561: Status: ACTIVE | Noted: 2025-01-08

## 2025-01-16 ENCOUNTER — APPOINTMENT (OUTPATIENT)
Dept: AFTER HOURS CARE | Facility: EMERGENCY ROOM | Age: 89
End: 2025-01-16

## 2025-01-16 ENCOUNTER — EMERGENCY (EMERGENCY)
Facility: HOSPITAL | Age: 89
LOS: 1 days | Discharge: HOME | End: 2025-01-16
Attending: STUDENT IN AN ORGANIZED HEALTH CARE EDUCATION/TRAINING PROGRAM | Admitting: STUDENT IN AN ORGANIZED HEALTH CARE EDUCATION/TRAINING PROGRAM
Payer: MEDICARE

## 2025-01-16 ENCOUNTER — NON-APPOINTMENT (OUTPATIENT)
Age: 89
End: 2025-01-16

## 2025-01-16 ENCOUNTER — TRANSCRIPTION ENCOUNTER (OUTPATIENT)
Age: 89
End: 2025-01-16

## 2025-01-16 VITALS
OXYGEN SATURATION: 96 % | DIASTOLIC BLOOD PRESSURE: 61 MMHG | TEMPERATURE: 98 F | SYSTOLIC BLOOD PRESSURE: 127 MMHG | HEART RATE: 50 BPM | RESPIRATION RATE: 17 BRPM

## 2025-01-16 VITALS
HEIGHT: 64 IN | TEMPERATURE: 98 F | WEIGHT: 119.93 LBS | HEART RATE: 76 BPM | OXYGEN SATURATION: 97 % | DIASTOLIC BLOOD PRESSURE: 90 MMHG | RESPIRATION RATE: 18 BRPM | SYSTOLIC BLOOD PRESSURE: 127 MMHG

## 2025-01-16 DIAGNOSIS — Z98.890 OTHER SPECIFIED POSTPROCEDURAL STATES: Chronic | ICD-10-CM

## 2025-01-16 DIAGNOSIS — M54.2 CERVICALGIA: ICD-10-CM

## 2025-01-16 LAB
ALBUMIN SERPL ELPH-MCNC: 2.5 G/DL — LOW (ref 3.3–5)
ALP SERPL-CCNC: 95 U/L — SIGNIFICANT CHANGE UP (ref 40–120)
ALT FLD-CCNC: 45 U/L — SIGNIFICANT CHANGE UP (ref 12–78)
ANION GAP SERPL CALC-SCNC: 6 MMOL/L — SIGNIFICANT CHANGE UP (ref 5–17)
APTT BLD: 30.7 SEC — SIGNIFICANT CHANGE UP (ref 24.5–35.6)
AST SERPL-CCNC: 39 U/L — HIGH (ref 15–37)
BASOPHILS # BLD AUTO: 0.04 K/UL — SIGNIFICANT CHANGE UP (ref 0–0.2)
BASOPHILS NFR BLD AUTO: 0.5 % — SIGNIFICANT CHANGE UP (ref 0–2)
BILIRUB SERPL-MCNC: 0.3 MG/DL — SIGNIFICANT CHANGE UP (ref 0.2–1.2)
BUN SERPL-MCNC: 29 MG/DL — HIGH (ref 7–23)
CALCIUM SERPL-MCNC: 8.9 MG/DL — SIGNIFICANT CHANGE UP (ref 8.5–10.1)
CHLORIDE SERPL-SCNC: 105 MMOL/L — SIGNIFICANT CHANGE UP (ref 96–108)
CO2 SERPL-SCNC: 27 MMOL/L — SIGNIFICANT CHANGE UP (ref 22–31)
CREAT SERPL-MCNC: 0.54 MG/DL — SIGNIFICANT CHANGE UP (ref 0.5–1.3)
EGFR: 94 ML/MIN/1.73M2 — SIGNIFICANT CHANGE UP
EOSINOPHIL # BLD AUTO: 0 K/UL — SIGNIFICANT CHANGE UP (ref 0–0.5)
EOSINOPHIL NFR BLD AUTO: 0 % — SIGNIFICANT CHANGE UP (ref 0–6)
GLUCOSE SERPL-MCNC: 193 MG/DL — HIGH (ref 70–99)
HCT VFR BLD CALC: 31 % — LOW (ref 39–50)
HGB BLD-MCNC: 10.8 G/DL — LOW (ref 13–17)
IMM GRANULOCYTES NFR BLD AUTO: 0.4 % — SIGNIFICANT CHANGE UP (ref 0–0.9)
INR BLD: 1.12 RATIO — SIGNIFICANT CHANGE UP (ref 0.85–1.16)
LYMPHOCYTES # BLD AUTO: 2.05 K/UL — SIGNIFICANT CHANGE UP (ref 1–3.3)
LYMPHOCYTES # BLD AUTO: 26.1 % — SIGNIFICANT CHANGE UP (ref 13–44)
MCHC RBC-ENTMCNC: 34.8 G/DL — SIGNIFICANT CHANGE UP (ref 32–36)
MCHC RBC-ENTMCNC: 38.4 PG — HIGH (ref 27–34)
MCV RBC AUTO: 110.3 FL — HIGH (ref 80–100)
MONOCYTES # BLD AUTO: 1.06 K/UL — HIGH (ref 0–0.9)
MONOCYTES NFR BLD AUTO: 13.5 % — SIGNIFICANT CHANGE UP (ref 2–14)
NEUTROPHILS # BLD AUTO: 4.66 K/UL — SIGNIFICANT CHANGE UP (ref 1.8–7.4)
NEUTROPHILS NFR BLD AUTO: 59.5 % — SIGNIFICANT CHANGE UP (ref 43–77)
NRBC # BLD: 0 /100 WBCS — SIGNIFICANT CHANGE UP (ref 0–0)
PLATELET # BLD AUTO: 362 K/UL — SIGNIFICANT CHANGE UP (ref 150–400)
POTASSIUM SERPL-MCNC: 5.3 MMOL/L — SIGNIFICANT CHANGE UP (ref 3.5–5.3)
POTASSIUM SERPL-SCNC: 5.3 MMOL/L — SIGNIFICANT CHANGE UP (ref 3.5–5.3)
PROT SERPL-MCNC: 5.8 G/DL — LOW (ref 6–8.3)
PROTHROM AB SERPL-ACNC: 13.2 SEC — SIGNIFICANT CHANGE UP (ref 9.9–13.4)
RBC # BLD: 2.81 M/UL — LOW (ref 4.2–5.8)
RBC # FLD: 13 % — SIGNIFICANT CHANGE UP (ref 10.3–14.5)
SODIUM SERPL-SCNC: 138 MMOL/L — SIGNIFICANT CHANGE UP (ref 135–145)
WBC # BLD: 7.84 K/UL — SIGNIFICANT CHANGE UP (ref 3.8–10.5)
WBC # FLD AUTO: 7.84 K/UL — SIGNIFICANT CHANGE UP (ref 3.8–10.5)

## 2025-01-16 PROCEDURE — 70496 CT ANGIOGRAPHY HEAD: CPT | Mod: MC

## 2025-01-16 PROCEDURE — 70498 CT ANGIOGRAPHY NECK: CPT | Mod: MC

## 2025-01-16 PROCEDURE — 85610 PROTHROMBIN TIME: CPT

## 2025-01-16 PROCEDURE — 85025 COMPLETE CBC W/AUTO DIFF WBC: CPT

## 2025-01-16 PROCEDURE — 99215 OFFICE O/P EST HI 40 MIN: CPT | Mod: NC,2W

## 2025-01-16 PROCEDURE — 99285 EMERGENCY DEPT VISIT HI MDM: CPT | Mod: 25

## 2025-01-16 PROCEDURE — 70498 CT ANGIOGRAPHY NECK: CPT | Mod: 26

## 2025-01-16 PROCEDURE — 93005 ELECTROCARDIOGRAM TRACING: CPT

## 2025-01-16 PROCEDURE — 36415 COLL VENOUS BLD VENIPUNCTURE: CPT

## 2025-01-16 PROCEDURE — 99285 EMERGENCY DEPT VISIT HI MDM: CPT | Mod: FS

## 2025-01-16 PROCEDURE — 96374 THER/PROPH/DIAG INJ IV PUSH: CPT | Mod: XU

## 2025-01-16 PROCEDURE — 36410 VNPNXR 3YR/> PHY/QHP DX/THER: CPT

## 2025-01-16 PROCEDURE — 70450 CT HEAD/BRAIN W/O DYE: CPT | Mod: MC

## 2025-01-16 PROCEDURE — 80053 COMPREHEN METABOLIC PANEL: CPT

## 2025-01-16 PROCEDURE — 93010 ELECTROCARDIOGRAM REPORT: CPT

## 2025-01-16 PROCEDURE — 70450 CT HEAD/BRAIN W/O DYE: CPT | Mod: 26,59

## 2025-01-16 PROCEDURE — 86850 RBC ANTIBODY SCREEN: CPT

## 2025-01-16 PROCEDURE — 86900 BLOOD TYPING SEROLOGIC ABO: CPT

## 2025-01-16 PROCEDURE — 70496 CT ANGIOGRAPHY HEAD: CPT | Mod: 26

## 2025-01-16 PROCEDURE — 85730 THROMBOPLASTIN TIME PARTIAL: CPT

## 2025-01-16 PROCEDURE — 86901 BLOOD TYPING SEROLOGIC RH(D): CPT

## 2025-01-16 RX ORDER — SODIUM CHLORIDE 9 MG/ML
500 INJECTION, SOLUTION INTRAMUSCULAR; INTRAVENOUS; SUBCUTANEOUS ONCE
Refills: 0 | Status: COMPLETED | OUTPATIENT
Start: 2025-01-16 | End: 2025-01-16

## 2025-01-16 RX ORDER — LIDOCAINE 50 MG/G
1 OINTMENT TOPICAL ONCE
Refills: 0 | Status: COMPLETED | OUTPATIENT
Start: 2025-01-16 | End: 2025-01-16

## 2025-01-16 RX ORDER — ACETAMINOPHEN 80 MG/.8ML
1000 SOLUTION/ DROPS ORAL ONCE
Refills: 0 | Status: COMPLETED | OUTPATIENT
Start: 2025-01-16 | End: 2025-01-16

## 2025-01-16 RX ADMIN — SODIUM CHLORIDE 1000 MILLILITER(S): 9 INJECTION, SOLUTION INTRAMUSCULAR; INTRAVENOUS; SUBCUTANEOUS at 18:53

## 2025-01-16 RX ADMIN — LIDOCAINE 1 PATCH: 50 OINTMENT TOPICAL at 18:52

## 2025-01-16 RX ADMIN — ACETAMINOPHEN 400 MILLIGRAM(S): 80 SOLUTION/ DROPS ORAL at 18:53

## 2025-01-16 NOTE — ED CLERICAL - NS ED CLERK NOTE PRE-ARRIVAL INFORMATION; ADDITIONAL PRE-ARRIVAL INFORMATION

## 2025-01-16 NOTE — ED PROVIDER NOTE - PROVIDER TOKENS
PROVIDER:[TOKEN:[[id number]],FOLLOWUP:[1-3 Days]],PROVIDER:[TOKEN:[[id number]],FOLLOWUP:[1-3 Days]],PROVIDER:[TOKEN:[[id number]],FOLLOWUP:[1-3 Days]]

## 2025-01-16 NOTE — ED ADULT NURSE REASSESSMENT NOTE - NS ED NURSE REASSESS COMMENT FT1
Incontinence care provided by RN and ED tech. Pt had BM and urinated in diaper - diaper changed and pt repositioned to promote skin integrity. EKG obtained and given to MD. Pt now sleeping between care
Pt received from CULLEN Avendano in room 5B. Pt US guided IVL assessed by RN - dressing is CDI and IVL flushing without difficulty and blood return is present. Pt with no s+s of distress noted at this time. Pt able to complete CT scans. EKG to be obtained now that pt returned from scans.

## 2025-01-16 NOTE — ED PROVIDER NOTE - OBJECTIVE STATEMENT
89 y/o male with a PMHx of advanced dementia, nonverbal and bedbound presents to the ED with his wife for left neck pain and ?right shoulder pain, reports he is more rigid and she is not sure if its from pain. Reports he woke up 10:30 AM this morning and was feeling okay, he sat up straight and ate breakfast in bed.  When the aide moved him over to his wheelchair around 2 PM he was noticed that he was leaning to his right side and seems to have pain when his wife palpated on his neck.  She is not sure if he injured himself while being transferred to the wheelchair.  She denies any direct trauma or falls.  No other reports of illness.  No vomiting diarrhea fever chills or other complaints.

## 2025-01-16 NOTE — ED PROVIDER NOTE - PATIENT PORTAL LINK FT
You can access the FollowMyHealth Patient Portal offered by [facility] by registering at the following website: [email or web address]. By joining [employer]’s FollowMyHealth portal, you will also be able to view your health information using other applications (apps) compatible with our system.

## 2025-01-16 NOTE — ED PROVIDER NOTE - PROGRESS NOTE DETAILS
MYRA Fournier labs reviewed–no acute findings.  Head CT noncontrast unremarkable.  CT angios head and neck pending.  Patient's wife refusing shoulder x-ray Dr. Gavino Robles  CTA noted. call placed for neurovascular. spoke with neuro vascular, will review images. No intervention needed at this time per neurointerventional Dr. Alexandra.  Discussed with patient's wife, updated on condition.  States he is much better appearing and would like to take him home.   Will discharge patient home with follow-up and return precautions.

## 2025-01-16 NOTE — ED ADULT TRIAGE NOTE - CHIEF COMPLAINT QUOTE
no known fall. pt is not a good historian. pt is grimacing and unable to lift right shoulder. and is in pain when neck/ shoulder is palpated

## 2025-01-16 NOTE — ED PROVIDER NOTE - CLINICAL SUMMARY MEDICAL DECISION MAKING FREE TEXT BOX
patient is a 89-year-old male with history of DM, HLD, HTN, Alzheimer's dementia, nonambulatory at baseline who presents emergency department brought by wife for contractures and neck pain.  According to wife patient was well this morning when he woke up at his normal baseline approximately 12:00 patient began that sitting up in a chair normally, appeared rigid.  Wife believes he has been pain in his neck.  Patient nonverbal, usually.    General: well appearing, no acute distress, underweight. lying on R side.  HENT:  Head: normocephalic, atraumatic.  Ears: canal clear, TM intact with good light reflex  Eyes: EOMI, PERRLA, no nystagmus  Nose: atraumatic  Oropharynx: mucosa pink, dry, no lesions, uvula midline.  Neck: no lymphadenopathy    Cardiac: heart RRR, no murmurs, rubs, gallops, pulses 2+ x4  Pulm: lungs CTA  Neuro: unable to assess. rigidity of BL UE and LE.  Skin: warm, dry, no rash, laceration, abrasion, contusion    will obtain labs, imaging and reeval

## 2025-01-16 NOTE — ED ADULT NURSE NOTE - NSFALLASSISTNEEDED_ED_ALL_ED
Addended by: JON DANIELLE on: 9/12/2017 04:17 PM     Modules accepted: Orders    
Standing/Walking/Toileting/Moving from bed to stretcher

## 2025-01-16 NOTE — ED PROVIDER NOTE - CARE PROVIDER_API CALL
Olaf To  Vascular Neurology  [address] [place] [address]  Phone: [phone]  Fax: [phone]  Follow Up Time: 1-3 Days    Janes Allison  Neurology  [address] [place] [address]  Phone: [phone]  Fax: [phone]  Follow Up Time: 1-3 Days    Willis Londono  Internal Medicine  [address] [facility] [address] [address]  [place] [address]  Phone: [phone]  Fax: [phone]  Follow Up Time: 1-3 Days

## 2025-01-16 NOTE — ED ADULT NURSE REASSESSMENT NOTE - NSFALLHARMRISKINTERV_ED_ALL_ED

## 2025-01-16 NOTE — ED PROVIDER NOTE - PHYSICAL EXAMINATION
Gen: Well appearing in NAD.   Eyes: PERRLA  Head: atraumatic  Heart: s1/s2, RRR  Lung: CTA b/l, no wheezing/rhonchi or rales.  Abd: soft, NT/ND,   Msk: +mild TTP over the left side of the neck.  pt is contracted in bed, wife reports he has a chronic left shoulder fx and doesn't move the left shoulder.  Pt laying in bed on his right side, he is able to move the right arm. No obvious deformities noted. Legs also contracted.  Neuro: awake and alert  Skin: Normal for race. No rashes/bruising   Psych: Calm and cooperative

## 2025-01-16 NOTE — ED PROVIDER NOTE - DIFFERENTIAL DIAGNOSIS
Differential Diagnosis DDx includes but not limited to: CVA VS carotid dissection VS electrolyte abnormality

## 2025-01-16 NOTE — ED ADULT NURSE NOTE - NSFALLFACTORS_ED_ALL_ED
Frequent toileting needed/Altered elimination/Confusion/AMS/Disorientation/Impaired gait/Impaired vision

## 2025-01-16 NOTE — ED ADULT NURSE NOTE - NSFALLHARMRISKINTERV_ED_ALL_ED
Assistance OOB with selected safe patient handling equipment if applicable/Assistance with ambulation/Communicate risk of Fall with Harm to all staff, patient, and family/Monitor gait and stability/Monitor for mental status changes and reorient to person, place, and time, as needed/Move patient closer to nursing station/within visual sight of ED staff/Provide patient with walking aids/Provide visual cue: red socks, yellow wristband, yellow gown, etc/Reinforce activity limits and safety measures with patient and family/Toileting schedule using arm’s reach rule for commode and bathroom/Use of alarms - bed, stretcher, chair and/or video monitoring/Bed in lowest position, wheels locked, appropriate side rails in place/Call bell, personal items and telephone in reach/Instruct patient to call for assistance before getting out of bed/chair/stretcher/Non-slip footwear applied when patient is off stretcher/[place] to call system/Physically safe environment - no spills, clutter or unnecessary equipment/Purposeful Proactive Rounding/Room/bathroom lighting operational, light cord in reach

## 2025-01-17 ENCOUNTER — APPOINTMENT (OUTPATIENT)
Dept: HOME HEALTH SERVICES | Facility: HOME HEALTH | Age: 89
End: 2025-01-17

## 2025-02-11 ENCOUNTER — LABORATORY RESULT (OUTPATIENT)
Age: 89
End: 2025-02-11

## 2025-02-12 ENCOUNTER — LABORATORY RESULT (OUTPATIENT)
Age: 89
End: 2025-02-12

## 2025-02-13 DIAGNOSIS — E87.5 HYPERKALEMIA: ICD-10-CM

## 2025-02-17 ENCOUNTER — LABORATORY RESULT (OUTPATIENT)
Age: 89
End: 2025-02-17

## 2025-02-25 ENCOUNTER — APPOINTMENT (OUTPATIENT)
Dept: HOME HEALTH SERVICES | Facility: HOME HEALTH | Age: 89
End: 2025-02-25
Payer: COMMERCIAL

## 2025-02-25 VITALS
SYSTOLIC BLOOD PRESSURE: 136 MMHG | DIASTOLIC BLOOD PRESSURE: 60 MMHG | OXYGEN SATURATION: 95 % | HEART RATE: 60 BPM | RESPIRATION RATE: 15 BRPM

## 2025-02-25 DIAGNOSIS — F03.93 UNSPECIFIED DEMENTIA, UNSPECIFIED SEVERITY, WITH MOOD DISTURBANCE: ICD-10-CM

## 2025-02-25 DIAGNOSIS — E46 UNSPECIFIED PROTEIN-CALORIE MALNUTRITION: ICD-10-CM

## 2025-02-25 DIAGNOSIS — R53.2 FUNCTIONAL QUADRIPLEGIA: ICD-10-CM

## 2025-02-25 DIAGNOSIS — K76.0 FATTY (CHANGE OF) LIVER, NOT ELSEWHERE CLASSIFIED: ICD-10-CM

## 2025-02-25 PROCEDURE — 99349 HOME/RES VST EST MOD MDM 40: CPT

## 2025-04-10 ENCOUNTER — APPOINTMENT (OUTPATIENT)
Dept: HOME HEALTH SERVICES | Facility: HOME HEALTH | Age: 89
End: 2025-04-10

## 2025-04-10 VITALS
HEART RATE: 65 BPM | DIASTOLIC BLOOD PRESSURE: 60 MMHG | RESPIRATION RATE: 18 BRPM | TEMPERATURE: 98.2 F | OXYGEN SATURATION: 95 % | SYSTOLIC BLOOD PRESSURE: 110 MMHG

## 2025-04-22 NOTE — ED ADULT TRIAGE NOTE - TEMPERATURE IN CELSIUS (DEGREES C)
36.3
Show Applicator Variable?: Yes
Application Tool (Optional): Liquid Nitrogen Sprayer
Post-Care Instructions: I reviewed with the patient in detail post-care instructions. Patient is to wear sunprotection, and avoid picking at any of the treated lesions. Pt may apply Vaseline to crusted or scabbing areas.
Duration Of Freeze Thaw-Cycle (Seconds): 0
Render Note In Bullet Format When Appropriate: No
Number Of Freeze-Thaw Cycles: 3 freeze-thaw cycles
Consent: The patient's verbal consent was obtained including but not limited to risks of crusting, scabbing, blistering, scarring, darker or lighter pigmentary change, recurrence, incomplete removal and infection.
Detail Level: Detailed
Medical Necessity Clause: This procedure was medically necessary because the lesions that were treated were:
Medical Necessity Information: It is in your best interest to select a reason for this procedure from the list below. All of these items fulfill various CMS LCD requirements except the new and changing color options.
Spray Paint Text: The liquid nitrogen was applied to the skin utilizing a spray paint frosting technique.

## 2025-05-02 ENCOUNTER — TRANSCRIPTION ENCOUNTER (OUTPATIENT)
Age: 89
End: 2025-05-02

## 2025-05-09 NOTE — PATIENT PROFILE ADULT - NSPROIMPLANTSMEDDEV_GEN_A_NUR
ACM received staff message from RPM team regarding alert today. Staff message sent to PCP to please advise. Will also route note to PCP.     
None

## 2025-05-16 ENCOUNTER — APPOINTMENT (OUTPATIENT)
Dept: HOME HEALTH SERVICES | Facility: HOME HEALTH | Age: 89
End: 2025-05-16
Payer: MEDICARE

## 2025-05-16 DIAGNOSIS — I77.9 DISORDER OF ARTERIES AND ARTERIOLES, UNSPECIFIED: ICD-10-CM

## 2025-05-16 DIAGNOSIS — F03.93 UNSPECIFIED DEMENTIA, UNSPECIFIED SEVERITY, WITH MOOD DISTURBANCE: ICD-10-CM

## 2025-05-16 DIAGNOSIS — E78.5 HYPERLIPIDEMIA, UNSPECIFIED: ICD-10-CM

## 2025-05-16 DIAGNOSIS — E46 UNSPECIFIED PROTEIN-CALORIE MALNUTRITION: ICD-10-CM

## 2025-05-16 DIAGNOSIS — R53.2 FUNCTIONAL QUADRIPLEGIA: ICD-10-CM

## 2025-05-16 DIAGNOSIS — I10 ESSENTIAL (PRIMARY) HYPERTENSION: ICD-10-CM

## 2025-05-16 DIAGNOSIS — E11.9 TYPE 2 DIABETES MELLITUS W/OUT COMPLICATIONS: ICD-10-CM

## 2025-05-16 PROCEDURE — 99349 HOME/RES VST EST MOD MDM 40: CPT | Mod: 2W

## 2025-05-20 ENCOUNTER — LABORATORY RESULT (OUTPATIENT)
Age: 89
End: 2025-05-20

## 2025-05-21 LAB
ALBUMIN SERPL ELPH-MCNC: 3.6 G/DL
ALP BLD-CCNC: 104 U/L
ALT SERPL-CCNC: 58 U/L
ANION GAP SERPL CALC-SCNC: 12 MMOL/L
AST SERPL-CCNC: 37 U/L
BASOPHILS # BLD AUTO: 0.05 K/UL
BASOPHILS NFR BLD AUTO: 0.7 %
BILIRUB SERPL-MCNC: 0.5 MG/DL
BUN SERPL-MCNC: 30 MG/DL
CALCIUM SERPL-MCNC: 8.9 MG/DL
CHLORIDE SERPL-SCNC: 104 MMOL/L
CHOLEST SERPL-MCNC: 160 MG/DL
CO2 SERPL-SCNC: 25 MMOL/L
CREAT SERPL-MCNC: 0.58 MG/DL
EGFRCR SERPLBLD CKD-EPI 2021: 91 ML/MIN/1.73M2
EOSINOPHIL # BLD AUTO: 0.01 K/UL
EOSINOPHIL NFR BLD AUTO: 0.1 %
ESTIMATED AVERAGE GLUCOSE: 169 MG/DL
GLUCOSE SERPL-MCNC: 150 MG/DL
HBA1C MFR BLD HPLC: 7.5 %
HCT VFR BLD CALC: 35.6 %
HDLC SERPL-MCNC: 39 MG/DL
HGB BLD-MCNC: 11.8 G/DL
IMM GRANULOCYTES NFR BLD AUTO: 0.4 %
LDLC SERPL-MCNC: 109 MG/DL
LYMPHOCYTES # BLD AUTO: 1.71 K/UL
LYMPHOCYTES NFR BLD AUTO: 24.8 %
MAN DIFF?: NORMAL
MCHC RBC-ENTMCNC: 33.1 G/DL
MCHC RBC-ENTMCNC: 37.8 PG
MCV RBC AUTO: 114.1 FL
MONOCYTES # BLD AUTO: 0.82 K/UL
MONOCYTES NFR BLD AUTO: 11.9 %
NEUTROPHILS # BLD AUTO: 4.27 K/UL
NEUTROPHILS NFR BLD AUTO: 62.1 %
NONHDLC SERPL-MCNC: 121 MG/DL
PLATELET # BLD AUTO: 355 K/UL
POTASSIUM SERPL-SCNC: 4.6 MMOL/L
PROT SERPL-MCNC: 5.9 G/DL
RBC # BLD: 3.12 M/UL
RBC # FLD: 13.6 %
SODIUM SERPL-SCNC: 141 MMOL/L
TRIGL SERPL-MCNC: 59 MG/DL
TSH SERPL-ACNC: 2.33 UIU/ML
WBC # FLD AUTO: 6.89 K/UL

## 2025-05-31 ENCOUNTER — TRANSCRIPTION ENCOUNTER (OUTPATIENT)
Age: 89
End: 2025-05-31

## 2025-05-31 ENCOUNTER — APPOINTMENT (OUTPATIENT)
Dept: AFTER HOURS CARE | Facility: EMERGENCY ROOM | Age: 89
End: 2025-05-31
Payer: MEDICARE

## 2025-05-31 DIAGNOSIS — Z87.09 PERSONAL HISTORY OF OTHER DISEASES OF THE RESPIRATORY SYSTEM: ICD-10-CM

## 2025-05-31 DIAGNOSIS — J20.9 ACUTE BRONCHITIS, UNSPECIFIED: ICD-10-CM

## 2025-05-31 PROCEDURE — 99215 OFFICE O/P EST HI 40 MIN: CPT | Mod: 2W

## 2025-05-31 RX ORDER — LEVOFLOXACIN 25 MG/ML
25 SOLUTION ORAL DAILY
Qty: 150 | Refills: 0 | Status: ACTIVE | COMMUNITY
Start: 2025-05-31 | End: 1900-01-01

## 2025-05-31 RX ORDER — LEVOFLOXACIN 25 MG/ML
25 SOLUTION ORAL DAILY
Qty: 200 | Refills: 0 | Status: ACTIVE | COMMUNITY
Start: 2025-05-31 | End: 1900-01-01

## 2025-05-31 RX ORDER — IPRATROPIUM BROMIDE AND ALBUTEROL SULFATE 2.5; .5 MG/3ML; MG/3ML
0.5-2.5 (3) SOLUTION RESPIRATORY (INHALATION) 4 TIMES DAILY
Qty: 1 | Refills: 0 | Status: ACTIVE | COMMUNITY
Start: 2025-05-31 | End: 1900-01-01

## 2025-06-02 ENCOUNTER — NON-APPOINTMENT (OUTPATIENT)
Age: 89
End: 2025-06-02

## 2025-06-03 ENCOUNTER — NON-APPOINTMENT (OUTPATIENT)
Age: 89
End: 2025-06-03

## 2025-06-03 RX ORDER — NEBULIZER ACCESSORIES
KIT MISCELLANEOUS
Qty: 1 | Refills: 0 | Status: ACTIVE | COMMUNITY
Start: 2025-06-03 | End: 1900-01-01

## 2025-06-04 ENCOUNTER — NON-APPOINTMENT (OUTPATIENT)
Age: 89
End: 2025-06-04

## 2025-06-05 ENCOUNTER — TRANSCRIPTION ENCOUNTER (OUTPATIENT)
Age: 89
End: 2025-06-05

## 2025-06-05 PROBLEM — J45.909 ASTHMA, ACUTE: Status: ACTIVE | Noted: 2025-06-05

## 2025-06-06 ENCOUNTER — NON-APPOINTMENT (OUTPATIENT)
Age: 89
End: 2025-06-06

## 2025-06-06 RX ORDER — SODIUM CHLORIDE FOR INHALATION 0.9 %
0.9 VIAL, NEBULIZER (ML) INHALATION
Qty: 1 | Refills: 5 | Status: ACTIVE | COMMUNITY
Start: 2025-06-06 | End: 1900-01-01

## 2025-06-06 RX ORDER — ALBUTEROL SULFATE 2.5 MG/3ML
(2.5 MG/3ML) SOLUTION RESPIRATORY (INHALATION)
Qty: 1 | Refills: 3 | Status: ACTIVE | COMMUNITY
Start: 2025-06-06 | End: 1900-01-01

## 2025-06-07 ENCOUNTER — TRANSCRIPTION ENCOUNTER (OUTPATIENT)
Age: 89
End: 2025-06-07

## 2025-06-07 ENCOUNTER — NON-APPOINTMENT (OUTPATIENT)
Age: 89
End: 2025-06-07

## 2025-06-11 ENCOUNTER — NON-APPOINTMENT (OUTPATIENT)
Age: 89
End: 2025-06-11

## 2025-07-01 ENCOUNTER — APPOINTMENT (OUTPATIENT)
Dept: HOME HEALTH SERVICES | Facility: HOME HEALTH | Age: 89
End: 2025-07-01

## 2025-07-08 ENCOUNTER — NON-APPOINTMENT (OUTPATIENT)
Age: 89
End: 2025-07-08

## 2025-08-28 ENCOUNTER — LABORATORY RESULT (OUTPATIENT)
Age: 89
End: 2025-08-28

## 2025-08-29 LAB
ALBUMIN SERPL ELPH-MCNC: 3.6 G/DL
ALP BLD-CCNC: 126 U/L
ALT SERPL-CCNC: 20 U/L
ANION GAP SERPL CALC-SCNC: 17 MMOL/L
AST SERPL-CCNC: 19 U/L
BILIRUB SERPL-MCNC: 0.8 MG/DL
BUN SERPL-MCNC: 40 MG/DL
CALCIUM SERPL-MCNC: 9.3 MG/DL
CHLORIDE SERPL-SCNC: 100 MMOL/L
CO2 SERPL-SCNC: 25 MMOL/L
CREAT SERPL-MCNC: 0.67 MG/DL
EGFRCR SERPLBLD CKD-EPI 2021: 87 ML/MIN/1.73M2
ESTIMATED AVERAGE GLUCOSE: 174 MG/DL
FERRITIN SERPL-MCNC: 414 NG/ML
GLUCOSE SERPL-MCNC: 160 MG/DL
HBA1C MFR BLD HPLC: 7.7 %
HCT VFR BLD CALC: 35.6 %
HGB BLD-MCNC: 11.5 G/DL
IRON SATN MFR SERPL: 13 %
IRON SERPL-MCNC: 27 UG/DL
MAGNESIUM SERPL-MCNC: 2.1 MG/DL
MCHC RBC-ENTMCNC: 32.3 G/DL
MCHC RBC-ENTMCNC: 37 PG
MCV RBC AUTO: 114.5 FL
PHOSPHATE SERPL-MCNC: 3.2 MG/DL
PLATELET # BLD AUTO: 338 K/UL
POTASSIUM SERPL-SCNC: 4 MMOL/L
PROT SERPL-MCNC: 6.2 G/DL
RBC # BLD: 3.11 M/UL
RBC # FLD: 13.9 %
SODIUM SERPL-SCNC: 142 MMOL/L
TIBC SERPL-MCNC: 202 UG/DL
UIBC SERPL-MCNC: 175 UG/DL
WBC # FLD AUTO: 14.85 K/UL

## 2025-09-02 ENCOUNTER — NON-APPOINTMENT (OUTPATIENT)
Age: 89
End: 2025-09-02

## 2025-09-02 DIAGNOSIS — E61.1 IRON DEFICIENCY: ICD-10-CM

## 2025-09-02 RX ORDER — ASCORBIC ACID 500 MG/5ML
500 SYRUP ORAL DAILY
Qty: 150 | Refills: 0 | Status: ACTIVE | COMMUNITY
Start: 2025-09-02 | End: 1900-01-01

## 2025-09-02 RX ORDER — FERROUS SULFATE 300 MG/5ML
300 (60 FE) LIQUID (ML) ORAL DAILY
Qty: 150 | Refills: 0 | Status: ACTIVE | COMMUNITY
Start: 2025-09-02 | End: 1900-01-01

## 2025-09-03 ENCOUNTER — NON-APPOINTMENT (OUTPATIENT)
Age: 89
End: 2025-09-03

## 2025-09-03 RX ORDER — CHLORHEXIDINE GLUCONATE 4 %
325 (65 FE) LIQUID (ML) TOPICAL DAILY
Qty: 30 | Refills: 1 | Status: ACTIVE | COMMUNITY
Start: 2025-09-03 | End: 1900-01-01

## 2025-09-03 RX ORDER — MULTIVIT-MIN/FOLIC/VIT K/LYCOP 400-300MCG
500 TABLET ORAL DAILY
Qty: 30 | Refills: 2 | Status: ACTIVE | COMMUNITY
Start: 2025-09-03 | End: 1900-01-01

## 2025-09-08 ENCOUNTER — NON-APPOINTMENT (OUTPATIENT)
Age: 89
End: 2025-09-08

## 2025-09-08 ENCOUNTER — TRANSCRIPTION ENCOUNTER (OUTPATIENT)
Age: 89
End: 2025-09-08

## 2025-09-08 ENCOUNTER — APPOINTMENT (OUTPATIENT)
Dept: AFTER HOURS CARE | Facility: EMERGENCY ROOM | Age: 89
End: 2025-09-08
Payer: MEDICARE

## 2025-09-08 DIAGNOSIS — R73.9 HYPERGLYCEMIA, UNSPECIFIED: ICD-10-CM

## 2025-09-08 DIAGNOSIS — R50.9 FEVER, UNSPECIFIED: ICD-10-CM

## 2025-09-08 PROCEDURE — 99215 OFFICE O/P EST HI 40 MIN: CPT | Mod: NC,2W

## 2025-09-09 ENCOUNTER — NON-APPOINTMENT (OUTPATIENT)
Age: 89
End: 2025-09-09

## 2025-09-09 ENCOUNTER — TRANSCRIPTION ENCOUNTER (OUTPATIENT)
Age: 89
End: 2025-09-09

## 2025-09-10 ENCOUNTER — APPOINTMENT (OUTPATIENT)
Dept: HOME HEALTH SERVICES | Facility: HOME HEALTH | Age: 89
End: 2025-09-10

## 2025-09-12 ENCOUNTER — RESULT REVIEW (OUTPATIENT)
Age: 89
End: 2025-09-12

## 2025-09-15 ENCOUNTER — TRANSCRIPTION ENCOUNTER (OUTPATIENT)
Age: 89
End: 2025-09-15

## 2025-09-16 ENCOUNTER — NON-APPOINTMENT (OUTPATIENT)
Age: 89
End: 2025-09-16

## (undated) DEVICE — PACK MINOR WITH LAP

## (undated) DEVICE — DRAPE TOWEL BLUE 17" X 24"

## (undated) DEVICE — DRSG XEROFORM 5"

## (undated) DEVICE — DRAIN JACKSON PRATT 10MM FLAT FULL NO TROCAR

## (undated) DEVICE — DRSG COMBINE 5X9"

## (undated) DEVICE — PLV-SCD MACHINE: Type: DURABLE MEDICAL EQUIPMENT

## (undated) DEVICE — DRSG 4X4

## (undated) DEVICE — WRAP COMPRESSION CALF MED

## (undated) DEVICE — DRAPE 3/4 SHEET W REINFORCEMENT 56X77"

## (undated) DEVICE — ELCTR BOVIE BLADE EXTENDED 6.5"

## (undated) DEVICE — WRAP COMPRESSION CALF LG

## (undated) DEVICE — DRAIN RESERVOIR FOR JACKSON PRATT 100CC CARDINAL

## (undated) DEVICE — BLANKET WARMER UPPER ADULT

## (undated) DEVICE — ELCTR PENCIL SMOKE EVACUATION